# Patient Record
Sex: FEMALE | Race: WHITE | NOT HISPANIC OR LATINO | Employment: FULL TIME | URBAN - METROPOLITAN AREA
[De-identification: names, ages, dates, MRNs, and addresses within clinical notes are randomized per-mention and may not be internally consistent; named-entity substitution may affect disease eponyms.]

---

## 2017-03-29 ENCOUNTER — OFFICE VISIT (OUTPATIENT)
Dept: FAMILY MEDICINE | Facility: CLINIC | Age: 22
End: 2017-03-29
Payer: COMMERCIAL

## 2017-03-29 VITALS
DIASTOLIC BLOOD PRESSURE: 64 MMHG | HEIGHT: 68 IN | BODY MASS INDEX: 22.59 KG/M2 | SYSTOLIC BLOOD PRESSURE: 100 MMHG | WEIGHT: 149.06 LBS | HEART RATE: 87 BPM | RESPIRATION RATE: 16 BRPM | OXYGEN SATURATION: 99 %

## 2017-03-29 DIAGNOSIS — F41.8 DEPRESSION WITH ANXIETY: Primary | ICD-10-CM

## 2017-03-29 DIAGNOSIS — F81.9 LEARNING DISABILITY: ICD-10-CM

## 2017-03-29 PROCEDURE — 99213 OFFICE O/P EST LOW 20 MIN: CPT | Mod: S$GLB,,, | Performed by: FAMILY MEDICINE

## 2017-03-29 PROCEDURE — 1160F RVW MEDS BY RX/DR IN RCRD: CPT | Mod: S$GLB,,, | Performed by: FAMILY MEDICINE

## 2017-03-29 PROCEDURE — 99999 PR PBB SHADOW E&M-EST. PATIENT-LVL III: CPT | Mod: PBBFAC,,, | Performed by: FAMILY MEDICINE

## 2017-03-29 NOTE — PROGRESS NOTES
Subjective:       Patient ID: Thelma Roman is a 21 y.o. female.    Chief Complaint: No chief complaint on file.    HPI 21 year old female here for learning disability. Patient states she has failed math multiple times. She has a . She states she gets numbers mixed up. She also has increased anxiety with mental math.   Patient states these issues have been present since grade school. She has never been evaluated for a learning disorder. She is in her first year of college at the age of 21 and she states it is due to difficulty with math.   Review of Systems   Constitutional: Negative for chills and fever.   Respiratory: Negative for chest tightness and shortness of breath.    Cardiovascular: Negative for chest pain and leg swelling.   Gastrointestinal: Negative for abdominal pain, diarrhea, nausea and vomiting.   Psychiatric/Behavioral: Positive for decreased concentration. The patient is nervous/anxious.        Objective:      Vitals:    03/29/17 1602   BP: 100/64   Pulse: 87   Resp: 16     Physical Exam   Constitutional: She is oriented to person, place, and time. She appears well-developed and well-nourished. No distress.   HENT:   Head: Normocephalic and atraumatic.   Eyes: EOM are normal. Right eye exhibits no discharge. Left eye exhibits no discharge.   Cardiovascular: Normal rate.    Pulmonary/Chest: Effort normal.   Neurological: She is alert and oriented to person, place, and time.   Psychiatric: She has a normal mood and affect. Her behavior is normal. Thought content normal.   Vitals reviewed.      Assessment:       1. Depression with anxiety    2. Learning disability        Plan:         1. Patient to call psychologist for learning disability evaluation and behavioral therapy for anxiety. She will call clinic with update after assessment.   Depression with anxiety  -     Ambulatory referral to Psychology    Learning disability  -     Ambulatory referral to Psychology    Return if symptoms worsen  or fail to improve.

## 2017-03-29 NOTE — MR AVS SNAPSHOT
"    [facility]  [address] [place] [address]  Phone: [phone]  Fax: [phone]                  Thelma Roman   3/29/2017 4:00 PM   Office Visit    Description:  Female : 1995   Provider:  Aneta Garcia MD   Department:  [facility]           Diagnoses this Visit        Comments    Depression with anxiety    -  Primary     Learning disability                To Do List           Goals (5 Years of Data)     None      [facility]     [facility] On Call Nurse Care Line -  Assistance  Registered nurses in the [facility] On Call Center provide clinical advisement, health education, appointment booking, and other advisory services.  Call for this free service at [phone].             Medications           Message regarding Medications     Verify the changes and/or additions to your medication regime listed below are the same as discussed with your clinician today.  If any of these changes or additions are incorrect, please notify your healthcare provider.             Verify that the below list of medications is an accurate representation of the medications you are currently taking.  If none reported, the list may be blank. If incorrect, please contact your healthcare provider. Carry this list with you in case of emergency.           Current Medications     escitalopram oxalate (LEXAPRO) 20 MG tablet Take 1 tablet (20 mg total) by mouth once daily.    etonogestrel (NEXPLANON) 68 mg Impl by Subdermal route.           Clinical Reference Information           Your Vitals Were     BP Pulse Resp Height Weight SpO2    100/64 (BP Location: Left arm, Patient Position: Sitting, BP Method: Manual) 87 16 5' 8" (1.727 m) 67.6 kg (149 lb 0.5 oz) 99%    BMI                22.66 kg/m2          Blood Pressure          Most Recent Value    BP  100/64      Allergies as of 3/29/2017     No Known Allergies      Immunizations Administered on Date of Encounter - 3/29/2017     " None      Orders Placed During Today's Visit      Normal Orders This Visit    Ambulatory referral to Psychology       Language Assistance Services     ATTENTION: Language assistance services are available, free of charge. Please call [phone].      ATENCIÓN: Si habmarkel valle, tiene a gonzalez disposición servicios gratuitos de asistencia lingüística. Llame al [phone].     CHÚ Ý: N?u b?n nói Ti?ng Vi?t, có các d?ch v? h? tr? ngôn ng? mi?n phí dành cho b?n. G?i s? [phone].         [facility] complies with applicable Federal civil rights laws and does not discriminate on the basis of race, color, national origin, age, disability, or sex.

## 2017-04-11 ENCOUNTER — OFFICE VISIT (OUTPATIENT)
Dept: FAMILY MEDICINE | Facility: CLINIC | Age: 22
End: 2017-04-11
Payer: COMMERCIAL

## 2017-04-11 ENCOUNTER — TELEPHONE (OUTPATIENT)
Dept: FAMILY MEDICINE | Facility: CLINIC | Age: 22
End: 2017-04-11

## 2017-04-11 VITALS
OXYGEN SATURATION: 97 % | SYSTOLIC BLOOD PRESSURE: 116 MMHG | WEIGHT: 165.13 LBS | DIASTOLIC BLOOD PRESSURE: 74 MMHG | HEART RATE: 88 BPM | RESPIRATION RATE: 16 BRPM | HEIGHT: 69 IN | BODY MASS INDEX: 24.46 KG/M2

## 2017-04-11 DIAGNOSIS — S66.911A WRIST STRAIN, RIGHT, INITIAL ENCOUNTER: ICD-10-CM

## 2017-04-11 DIAGNOSIS — S99.911A RIGHT ANKLE INJURY, INITIAL ENCOUNTER: Primary | ICD-10-CM

## 2017-04-11 PROCEDURE — 99214 OFFICE O/P EST MOD 30 MIN: CPT | Mod: S$GLB,,, | Performed by: NURSE PRACTITIONER

## 2017-04-11 PROCEDURE — 1160F RVW MEDS BY RX/DR IN RCRD: CPT | Mod: S$GLB,,, | Performed by: NURSE PRACTITIONER

## 2017-04-11 PROCEDURE — 99999 PR PBB SHADOW E&M-EST. PATIENT-LVL III: CPT | Mod: PBBFAC,,, | Performed by: NURSE PRACTITIONER

## 2017-04-11 RX ORDER — IBUPROFEN 800 MG/1
800 TABLET ORAL 3 TIMES DAILY
Qty: 42 TABLET | Refills: 0 | Status: SHIPPED | OUTPATIENT
Start: 2017-04-11 | End: 2017-04-25

## 2017-04-11 NOTE — PROGRESS NOTES
Subjective:       Patient ID: Thelma Roman is a 21 y.o. female.    Chief Complaint: Wrist Pain and Ankle Pain    Wrist Pain    The pain is present in the right wrist. This is a new problem. Episode onset: 4 days ago. There has been a history of trauma. The problem occurs constantly. The problem has been unchanged. The quality of the pain is described as aching. The pain is at a severity of 3/10. The pain is mild. Pertinent negatives include no inability to bear weight, joint swelling, limited range of motion, numbness, stiffness or tingling. The symptoms are aggravated by activity. She has tried acetaminophen for the symptoms. The treatment provided no relief.   Ankle Pain    Incident onset: 4 days ago. The incident occurred at home (fell down the stairs). The injury mechanism was an inversion injury. The pain is present in the right ankle. The quality of the pain is described as aching. The pain is at a severity of 5/10. The pain is moderate. The pain has been constant since onset. Pertinent negatives include no inability to bear weight, loss of motion, loss of sensation, muscle weakness, numbness or tingling. The symptoms are aggravated by movement. She has tried acetaminophen for the symptoms. The treatment provided no relief.     Review of Systems   Constitutional: Negative.    Musculoskeletal: Negative for joint swelling and stiffness.   Skin: Negative.    Neurological: Negative for tingling, weakness and numbness.   Hematological: Negative.    Psychiatric/Behavioral: The patient is nervous/anxious.        Objective:      Physical Exam   Constitutional: She is oriented to person, place, and time. She appears well-developed and well-nourished.   HENT:   Head: Normocephalic and atraumatic.   Eyes: EOM are normal. Pupils are equal, round, and reactive to light.   Neck: Normal range of motion.   Cardiovascular: Normal rate and intact distal pulses.    Pulmonary/Chest: Effort normal. No respiratory distress.    Musculoskeletal: She exhibits no edema.        Right wrist: She exhibits normal range of motion and no tenderness.        Right ankle: She exhibits normal range of motion and no swelling. Tenderness.        Feet:    Pain when right ankle inverted with ROM.    Neurological: She is alert and oriented to person, place, and time. Coordination normal.   Skin: Skin is warm and dry.   Psychiatric: She has a normal mood and affect. Her behavior is normal. Judgment and thought content normal.   Vitals reviewed.      Assessment:       1. Right ankle injury, initial encounter    2. Wrist strain, right, initial encounter        Plan:       Thelma was seen today for wrist pain and ankle pain.    Diagnoses and all orders for this visit:    Right ankle injury, initial encounter  -     X-Ray Ankle Complete Right; Future  -     ibuprofen (ADVIL,MOTRIN) 800 MG tablet; Take 1 tablet (800 mg total) by mouth 3 (three) times daily.    Wrist strain, right, initial encounter  -     ibuprofen (ADVIL,MOTRIN) 800 MG tablet; Take 1 tablet (800 mg total) by mouth 3 (three) times daily.      Follow-up in 2 weeks if symptoms worsen or fail to improve.     Terence Tam NP

## 2017-04-11 NOTE — MR AVS SNAPSHOT
[facility]  [address] [place] [address]  Phone: [phone]  Fax: [phone]                  Thelma Roman   2017 4:00 PM   Office Visit    Description:  Female : 1995   Provider:  Terence Tam NP   Department:  [facility]           Reason for Visit     Wrist Pain     Ankle Pain           Diagnoses this Visit        Comments    Right ankle injury, initial encounter    -  Primary     Wrist strain, right, initial encounter                To Do List           Future Appointments        Provider Department Dept Phone    2017 4:00 PM Terence Tam NP [facility] [phone]      Goals (5 Years of Data)     None       These Medications        Disp Refills Start End    ibuprofen (ADVIL,MOTRIN) 800 MG tablet 42 tablet 0 2017    Take 1 tablet (800 mg total) by mouth 3 (three) times daily. - Oral    Pharmacy: [id number] - [place] - [address] [facility] [address]  #: [phone]         [facility]     [facility] Nurse Care Line -  Assistance  Unless otherwise directed by your provider, please contact [facility], our nurse care line that is available for  assistance.     Registered nurses in the [facility] Center provide: appointment scheduling, clinical advisement, health education, and other advisory services.  Call: [phone] (toll free)               Medications           Message regarding Medications     Verify the changes and/or additions to your medication regime listed below are the same as discussed with your clinician today.  If any of these changes or additions are incorrect, please notify your healthcare provider.        START taking these NEW medications        Refills    ibuprofen (ADVIL,MOTRIN) 800 MG tablet 0    Sig: Take 1 tablet (800 mg total) by mouth 3 (three) times daily.    Class: Normal    Route: Oral           Verify that the below list of medications is an  "accurate representation of the medications you are currently taking.  If none reported, the list may be blank. If incorrect, please contact your healthcare provider. Carry this list with you in case of emergency.           Current Medications     escitalopram oxalate (LEXAPRO) 20 MG tablet Take 1 tablet (20 mg total) by mouth once daily.    etonogestrel (NEXPLANON) 68 mg Impl by Subdermal route.    ibuprofen (ADVIL,MOTRIN) 800 MG tablet Take 1 tablet (800 mg total) by mouth 3 (three) times daily.           Clinical Reference Information           Your Vitals Were     BP Pulse Resp Height Weight SpO2    116/74 (BP Location: Right arm, Patient Position: Sitting, BP Method: Manual) 88 16 5' 8.5" (1.74 m) 74.9 kg (165 lb 2 oz) 97%    BMI                24.74 kg/m2          Blood Pressure          Most Recent Value    BP  116/74      Allergies as of 4/11/2017     No Known Allergies      Immunizations Administered on Date of Encounter - 4/11/2017     None      Orders Placed During Today's Visit     Future Labs/Procedures Expected by Expires    X-Ray Ankle Complete Right  4/11/2017 4/11/2018      Instructions    1. Rest, warm compress to area periodically throughout the day, and apply ace bandage to right ankle for support.   2. Follow-up in 2 weeks if symptoms worsen or fail to improve.        Language Assistance Services     ATTENTION: Language assistance services are available, free of charge. Please call [phone].      ATENCIÓN: Si gwendolyn valle, tiene a gonzalez disposición servicios gratuitos de asistencia lingüística. Llame al [phone].     [facility] Ý: N?u b?n nói Ti?ng Vi?t, có các d?ch v? h? tr? ngôn ng? mi?n phí dành cho b?n. G?i s? [phone].         [facility] complies with applicable Federal civil rights laws and does not discriminate on the basis of race, color, national origin, age, disability, or sex.        "

## 2017-04-11 NOTE — PATIENT INSTRUCTIONS
1. Rest, warm compress to area periodically throughout the day, and apply ace bandage to right ankle for support.   2. Follow-up in 2 weeks if symptoms worsen or fail to improve.

## 2017-05-04 DIAGNOSIS — F41.8 DEPRESSION WITH ANXIETY: ICD-10-CM

## 2017-05-05 RX ORDER — ESCITALOPRAM OXALATE 20 MG/1
TABLET ORAL
Qty: 30 TABLET | Refills: 4 | Status: SHIPPED | OUTPATIENT
Start: 2017-05-05 | End: 2017-10-15 | Stop reason: SDUPTHER

## 2017-07-17 ENCOUNTER — PATIENT MESSAGE (OUTPATIENT)
Dept: FAMILY MEDICINE | Facility: CLINIC | Age: 22
End: 2017-07-17

## 2017-07-24 ENCOUNTER — PATIENT MESSAGE (OUTPATIENT)
Dept: FAMILY MEDICINE | Facility: CLINIC | Age: 22
End: 2017-07-24

## 2017-08-03 ENCOUNTER — PATIENT MESSAGE (OUTPATIENT)
Dept: FAMILY MEDICINE | Facility: CLINIC | Age: 22
End: 2017-08-03

## 2017-08-21 ENCOUNTER — OFFICE VISIT (OUTPATIENT)
Dept: URGENT CARE | Facility: CLINIC | Age: 22
End: 2017-08-21
Payer: COMMERCIAL

## 2017-08-21 VITALS
DIASTOLIC BLOOD PRESSURE: 60 MMHG | TEMPERATURE: 99 F | BODY MASS INDEX: 27.64 KG/M2 | RESPIRATION RATE: 18 BRPM | HEIGHT: 66 IN | WEIGHT: 172 LBS | OXYGEN SATURATION: 98 % | SYSTOLIC BLOOD PRESSURE: 119 MMHG | HEART RATE: 102 BPM

## 2017-08-21 DIAGNOSIS — R25.1 SHAKINESS: Primary | ICD-10-CM

## 2017-08-21 DIAGNOSIS — R00.0 TACHYCARDIA: ICD-10-CM

## 2017-08-21 PROCEDURE — 3008F BODY MASS INDEX DOCD: CPT | Mod: S$GLB,,, | Performed by: PHYSICIAN ASSISTANT

## 2017-08-21 PROCEDURE — 99214 OFFICE O/P EST MOD 30 MIN: CPT | Mod: S$GLB,,, | Performed by: PHYSICIAN ASSISTANT

## 2017-08-21 NOTE — PATIENT INSTRUCTIONS
Dizziness (Uncertain Cause)  Dizziness is a common symptom. It may be described as lightheadedness, spinning, or feeling like you are going to faint. Dizziness can have many causes.  Be sure to tell the healthcare provider about:  · All medicines you take, including prescription, over-the-counter, herbs, and supplements  · Any other symptoms you have  · Any health problems you are being treated for  · Anything that causes the dizziness to get worse or better  Today's exam did not show an exact cause for your dizziness. Other tests may be needed. Follow up with your healthcare provider.  Home care  · Dizziness that occurs with sudden standing may be a sign of mild dehydration. Drink extra fluids for the next few days.  · If you recently started a new medicine, stopped a medicine, or had the dose of a current medicine changed, talk with the prescribing healthcare provider. Your medicine plan may need adjustment.  · If dizziness lasts more than a few seconds, sit or lie down until it passes. This may help prevent injury in case you pass out.  · Do not drive or use power tools or dangerous equipment until you have had no dizziness for at least 48 hours.  Follow-up care  Follow up with your healthcare provider for further evaluation within the next 7 days or as advised.  When to seek medical advice  Call your healthcare provider for any of the following:  · Worsening of symptoms or new symptoms  · Passing out or seizure  · Repeated vomiting  · Headache  · Palpitations (the sense that your heart is fluttering or beating fast or hard)  · Shortness of breath  · Blood in vomit or stool (black or red color)  · Weakness of an arm or leg or one side of the face  · Vision or hearing changes  · Trouble walking or speaking  · Chest, arm, neck, back, or jaw pain  Date Last Reviewed: 8/23/2015  © 3133-5468 [employer]. [address] [place] [address], [place] [address]. All rights reserved. This information is not intended as  "a substitute for professional medical care. Always follow your healthcare professional's instructions.        Heart Palpitations    Palpitations are the feeling that your heart is beating hard, fast, or irregular. Some describe it as "pounding" or "skipped beats." Palpitations may occur in someone with heart disease, but can also occur in a healthy person.  Heart-related causes:  · Arrhythmia (a change from the heart's normal rhythm)  · Heart valve disease  · Disease of the heart muscle  · Coronary artery disease  · High blood pressure  Non-heart-related causes:  · Certain medicines (such as asthma inhalers and decongestants)  · Some herbal supplements, energy drinks and pills, and weight loss pills  · Illegal stimulant drugs (such as cocaine, crank, methamphetamine, PCP, bath salts, ecstasy)  · Caffeine, alcohol, and tobacco  · Medical conditions such as thyroid disease, anemia, anxiety, and panic disorder  Sometimes the cause can't be found.  Home care  Follow these home care tips:  · Avoid excess caffeine, alcohol, tobacco, and any stimulant drugs.  · Tell your doctor about any prescription or over-the-counter or herbal medicines you take.  Follow-up care  · Follow up with your doctor, or as advised.  Call 911  This is the fastest and safest way to get to the emergency department. The paramedics can also begin treatment on the way to the hospital, if needed.  Don't wait until your symptoms are severe to call 911. These are reasons to call 911:  · Chest pain  · Shortness of breath  · Feeling lightheaded, faint, or dizzy  · Fainting or loss of consciousness  · Very irregular heartbeat  · Rapid heartbeat that makes you uncomfortable  · Slower than usual heart rate associated with symptoms  · Slower than usual heart rate  · Chest pain with weakness, dizziness, heavy sweating, nausea, or vomiting  · Extreme drowsiness or confusion  · Weakness of an arm or leg, or on 1 side of the face  · Difficulty with speech or " vision  When to seek medical advice  Get prompt medical attention if you have palpitations and any of the following:  · Weakness  · Dizziness  · Lightheadedness  · Fainting  Date Last Reviewed: 4/27/2016 © 2000-2016 [employer]. [address] [place] [address], [place] [address]. All rights reserved. This information is not intended as a substitute for professional medical care. Always follow your healthcare professional's instructions.      Please follow up with your Primary care provider within 2-5 days if your signs ans symptoms have not resolved or worsen.     If your condition worsens or fails to improve we recommend that you receive another evaluation at the emergency room immediately or contact your primary medical clinic to discuss your concerns.   You must understand that you have received an Urgent Care treatment only and that you may be released before all of your medical problems are known or treated. You, the patient, will arrange for follow up care as instructed.

## 2017-08-21 NOTE — PROGRESS NOTES
"Subjective:       Patient ID: Thelma Roman is a 22 y.o. female.    Vitals:  height is 5' 6" (1.676 m) and weight is 78 kg (172 lb). Her temperature is 98.7 °F (37.1 °C). Her blood pressure is 119/60 and her pulse is 102. Her respiration is 18 and oxygen saturation is 98%.     Chief Complaint: Dizziness (shakiness) and Shortness of Breath    Patient states this is not a panic/anxiety attack like she is used to. She states her mental status is normal but she feels as if her body is shaking and she is scatter brained. She denies any neurologic symptoms. She denies N/V/D or syncope. She denies CP      Dizziness:   Chronicity:  Recurrent  Onset:  1 to 4 weeks ago  Progression since onset:  Gradually worsening  Frequency:  Every few hours  Severity:  Moderate  Dizziness characteristics:  Lightheaded/impending faint  Frequency of Spells:  Daily   Associated symptoms: headaches and light-headedness.no hearing loss, no ear congestion, no ear pain, no fever, no tinnitus, no nausea, no vomiting, no diaphoresis, no aural fullness, no weakness, no visual disturbances, no syncope, no palpitations, no panic, no facial weakness, no slurred speech, no numbness in extremities and no chest pain.  Aggravated by:  Exertion  Treatments tried:  Body position changes  Improvements on treatment:  Mild  Shortness of Breath   Associated symptoms include headaches. Pertinent negatives include no abdominal pain, chest pain, ear pain, fever, rash, sore throat, syncope or vomiting.     Review of Systems   Constitution: Positive for malaise/fatigue. Negative for chills, diaphoresis, fever and weakness.   HENT: Positive for headaches. Negative for ear pain, hearing loss, sore throat and tinnitus.    Eyes: Negative for blurred vision.   Cardiovascular: Negative for chest pain, palpitations and syncope.   Respiratory: Positive for shortness of breath.    Skin: Negative for rash.   Musculoskeletal: Negative for back pain and joint pain. "   Gastrointestinal: Negative for abdominal pain, diarrhea, nausea and vomiting.   Neurological: Positive for difficulty with concentration, dizziness and light-headedness.   Psychiatric/Behavioral: The patient is not nervous/anxious.        Objective:      Physical Exam   Constitutional: She is oriented to person, place, and time. She appears well-developed and well-nourished. She is cooperative.  Non-toxic appearance. She does not appear ill. No distress.   HENT:   Head: Normocephalic and atraumatic.   Right Ear: Hearing, tympanic membrane, external ear and ear canal normal.   Left Ear: Hearing, tympanic membrane, external ear and ear canal normal.   Nose: Nose normal. No mucosal edema, rhinorrhea or nasal deformity. No epistaxis. Right sinus exhibits no maxillary sinus tenderness and no frontal sinus tenderness. Left sinus exhibits no maxillary sinus tenderness and no frontal sinus tenderness.   Mouth/Throat: Uvula is midline, oropharynx is clear and moist and mucous membranes are normal. No trismus in the jaw. Normal dentition. No uvula swelling. No posterior oropharyngeal erythema.   Eyes: Conjunctivae and lids are normal. Right eye exhibits no discharge. Left eye exhibits no discharge. No scleral icterus.   Sclera clear bilat   Neck: Trachea normal, normal range of motion, full passive range of motion without pain and phonation normal. Neck supple.   Cardiovascular: Regular rhythm, normal heart sounds, intact distal pulses and normal pulses.  Tachycardia present.    Pulmonary/Chest: Effort normal and breath sounds normal. No respiratory distress. She has no decreased breath sounds. She has no wheezes. She has no rhonchi. She has no rales.   Abdominal: Soft. Normal appearance and bowel sounds are normal. She exhibits no distension, no pulsatile midline mass and no mass. There is no tenderness.   Musculoskeletal: Normal range of motion. She exhibits no edema or deformity.   Neurological: She is alert and oriented  to person, place, and time. She exhibits normal muscle tone. Coordination normal.   Skin: Skin is warm, dry and intact. She is not diaphoretic. No pallor.   Psychiatric: Her speech is normal and behavior is normal. Judgment and thought content normal. Her mood appears anxious. Cognition and memory are normal.   Nursing note and vitals reviewed.        EKG: normal EKG, normal sinus rhythm.     Assessment:       1. Shakiness    2. Tachycardia        Plan:         Shakiness    Tachycardia  -     IN OFFICE EKG 12-LEAD (to Muse)      DISCUSSED WITH PATIENT LUDY FU WITH PCP IS WARRANTED. I RECOMMEND LAB TESTING FOR HER THYROID HORMONE LEVELS SPECIFICALLY AS WELL AS CBC. NO EXACT CAUSE KNOWN AT THIS POINT. PATIENT STATES SHE HAS AN APPOINTMENT SCHEDULED WITH HER PCP THIS Friday.     If your condition worsens or fails to improve we recommend that you receive another evaluation at the emergency room immediately or contact your primary medical clinic to discuss your concerns.   You must understand that you have received an Urgent Care treatment only and that you may be released before all of your medical problems are known or treated. You, the patient, will arrange for follow up care as instructed.

## 2017-08-23 ENCOUNTER — TELEPHONE (OUTPATIENT)
Dept: URGENT CARE | Facility: CLINIC | Age: 22
End: 2017-08-23

## 2017-08-25 ENCOUNTER — OFFICE VISIT (OUTPATIENT)
Dept: FAMILY MEDICINE | Facility: CLINIC | Age: 22
End: 2017-08-25
Payer: COMMERCIAL

## 2017-08-25 VITALS
HEART RATE: 104 BPM | DIASTOLIC BLOOD PRESSURE: 76 MMHG | WEIGHT: 172.38 LBS | SYSTOLIC BLOOD PRESSURE: 116 MMHG | HEIGHT: 69 IN | BODY MASS INDEX: 25.53 KG/M2 | OXYGEN SATURATION: 98 % | RESPIRATION RATE: 18 BRPM

## 2017-08-25 DIAGNOSIS — R00.2 HEART PALPITATIONS: Primary | ICD-10-CM

## 2017-08-25 PROCEDURE — 99999 PR PBB SHADOW E&M-EST. PATIENT-LVL III: CPT | Mod: PBBFAC,,, | Performed by: FAMILY MEDICINE

## 2017-08-25 PROCEDURE — 99213 OFFICE O/P EST LOW 20 MIN: CPT | Mod: S$GLB,,, | Performed by: FAMILY MEDICINE

## 2017-08-25 PROCEDURE — 3008F BODY MASS INDEX DOCD: CPT | Mod: S$GLB,,, | Performed by: FAMILY MEDICINE

## 2017-08-25 NOTE — PROGRESS NOTES
Subjective:       Patient ID: Thelma Roman is a 22 y.o. female.    Chief Complaint: Tachycardia and Blurred Vision    HPI 22 year old female here for tachycardia and blurred vision 3 weeks ago. Patient states her HR has been in 120-130s. She has felt palpitations.   Patient went to urgent care 3 days ago and EKG was normal. Patient denies excess stress. She denies illicit drug use.   In the last 3 weeks, patient has had daily episodes where her HR increases to 120s. She has increased water intake and has not seen improvement in symptoms. She is not drinking coffee currently and still having episodes. She does not consume energy drinks. She states BP does not change. She is on a high protein diet and tries to eat every couple hours as her BG has been known to decrease.     Review of Systems   Constitutional: Negative for chills and fever.   Respiratory: Negative for chest tightness and shortness of breath.    Cardiovascular: Positive for palpitations. Negative for chest pain and leg swelling.   Gastrointestinal: Negative for abdominal pain, diarrhea, nausea and vomiting.   Neurological: Positive for dizziness.       Objective:      Vitals:    08/25/17 0931   BP: 116/76   Pulse: 104   Resp: 18     Physical Exam   Constitutional: She is oriented to person, place, and time. She appears well-developed and well-nourished. No distress.   Cardiovascular: Normal rate and regular rhythm.    Pulmonary/Chest: Effort normal. She has no wheezes.   Abdominal: Soft. There is no tenderness. There is no guarding.   Neurological: She is alert and oriented to person, place, and time.   Skin: She is not diaphoretic.   Psychiatric: She has a normal mood and affect. Her behavior is normal. Judgment and thought content normal.   Vitals reviewed.      Assessment:       1. Heart palpitations        Plan:         1. Heart palpitations: labs reviewed and are within normal range. Will refer to cards for further workup. Patient advised to go  to ED with any worsening of symptoms.     Heart palpitations  -     TSH; Future; Expected date: 08/25/2017  -     CBC auto differential; Future; Expected date: 08/25/2017  -     Comprehensive metabolic panel; Future; Expected date: 08/25/2017  -     Ambulatory referral to Cardiology      Return in about 1 week (around 9/1/2017).

## 2017-10-15 DIAGNOSIS — F41.8 DEPRESSION WITH ANXIETY: ICD-10-CM

## 2017-10-16 RX ORDER — ESCITALOPRAM OXALATE 20 MG/1
TABLET ORAL
Qty: 30 TABLET | Refills: 3 | Status: SHIPPED | OUTPATIENT
Start: 2017-10-16 | End: 2018-04-03

## 2018-04-03 ENCOUNTER — OFFICE VISIT (OUTPATIENT)
Dept: INTERNAL MEDICINE | Facility: CLINIC | Age: 23
End: 2018-04-03
Payer: COMMERCIAL

## 2018-04-03 VITALS
DIASTOLIC BLOOD PRESSURE: 60 MMHG | OXYGEN SATURATION: 98 % | WEIGHT: 183.75 LBS | RESPIRATION RATE: 16 BRPM | HEIGHT: 69 IN | HEART RATE: 81 BPM | SYSTOLIC BLOOD PRESSURE: 118 MMHG | BODY MASS INDEX: 27.22 KG/M2

## 2018-04-03 DIAGNOSIS — F41.0 PANIC DISORDER WITHOUT AGORAPHOBIA: Chronic | ICD-10-CM

## 2018-04-03 DIAGNOSIS — F41.8 DEPRESSION WITH ANXIETY: Primary | ICD-10-CM

## 2018-04-03 PROCEDURE — 99213 OFFICE O/P EST LOW 20 MIN: CPT | Mod: S$GLB,,, | Performed by: INTERNAL MEDICINE

## 2018-04-03 PROCEDURE — 99999 PR PBB SHADOW E&M-EST. PATIENT-LVL IV: CPT | Mod: PBBFAC,,, | Performed by: INTERNAL MEDICINE

## 2018-04-03 RX ORDER — SERTRALINE HYDROCHLORIDE 50 MG/1
50 TABLET, FILM COATED ORAL DAILY
Qty: 30 TABLET | Refills: 11 | Status: SHIPPED | OUTPATIENT
Start: 2018-04-03 | End: 2018-07-23 | Stop reason: DRUGHIGH
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
## 2018-04-17 ENCOUNTER — OFFICE VISIT (OUTPATIENT)
Dept: INTERNAL MEDICINE | Facility: CLINIC | Age: 23
End: 2018-04-17
Payer: COMMERCIAL

## 2018-04-17 VITALS
SYSTOLIC BLOOD PRESSURE: 118 MMHG | DIASTOLIC BLOOD PRESSURE: 60 MMHG | WEIGHT: 181.88 LBS | HEIGHT: 69 IN | BODY MASS INDEX: 26.94 KG/M2 | RESPIRATION RATE: 16 BRPM | OXYGEN SATURATION: 98 %

## 2018-04-17 DIAGNOSIS — F41.0 PANIC DISORDER WITHOUT AGORAPHOBIA: Chronic | ICD-10-CM

## 2018-04-17 DIAGNOSIS — F41.8 DEPRESSION WITH ANXIETY: Primary | ICD-10-CM

## 2018-04-17 DIAGNOSIS — R00.2 HEART PALPITATIONS: ICD-10-CM

## 2018-04-17 PROCEDURE — 99999 PR PBB SHADOW E&M-EST. PATIENT-LVL III: CPT | Mod: PBBFAC,,, | Performed by: INTERNAL MEDICINE

## 2018-04-17 PROCEDURE — 99213 OFFICE O/P EST LOW 20 MIN: CPT | Mod: S$GLB,,, | Performed by: INTERNAL MEDICINE

## 2018-04-17 NOTE — PROGRESS NOTES
"Subjective:      Patient ID: Thelma Roman is a 22 y.o. female.    Chief Complaint: Follow-up (2 week follow up)    HPI: 22 y.o. White female, feels the best she has in a long time.  Not anxious now, not angry.  Feels normal.  Has energy, but not manic, just not depressed.  Not suicidal or homicidal.  Sleeping.  Able to complete tasks.  Will be going to Psychologist in next several weeks.    Review of Systems   Respiratory: Negative for chest tightness and shortness of breath.    Cardiovascular: Negative for chest pain and palpitations.   Psychiatric/Behavioral:        No further panic       Objective:   /60 (BP Location: Right arm, Patient Position: Sitting, BP Method: Large (Manual))   Resp 16   Ht 5' 9" (1.753 m)   Wt 82.5 kg (181 lb 14.4 oz)   SpO2 98%   BMI 26.86 kg/m²     Physical Exam   Constitutional: She is oriented to person, place, and time. She appears well-developed and well-nourished.   HENT:   Head: Normocephalic and atraumatic.   Neurological: She is alert and oriented to person, place, and time.   Skin: Skin is warm and dry.   Psychiatric: She has a normal mood and affect. Her behavior is normal.   Nursing note and vitals reviewed.      Assessment:     1. Depression with anxiety    2. Panic disorder without agoraphobia    3. Heart palpitations    Seem to be under control with Zoloft 50mg/day.  Plan:     Depression with anxiety    Panic disorder without agoraphobia    Heart palpitations    Same RX  See Psychologist.  "

## 2018-05-08 ENCOUNTER — TELEPHONE (OUTPATIENT)
Dept: INTERNAL MEDICINE | Facility: CLINIC | Age: 23
End: 2018-05-08

## 2018-05-08 NOTE — TELEPHONE ENCOUNTER
Left message on patient voice mail to call office and reschedule appointment. Dr. Moise is out of office today.vf/ma

## 2018-05-11 ENCOUNTER — PATIENT MESSAGE (OUTPATIENT)
Dept: INTERNAL MEDICINE | Facility: CLINIC | Age: 23
End: 2018-05-11

## 2018-06-26 ENCOUNTER — OFFICE VISIT (OUTPATIENT)
Dept: FAMILY MEDICINE | Facility: CLINIC | Age: 23
End: 2018-06-26
Payer: COMMERCIAL

## 2018-06-26 VITALS
TEMPERATURE: 99 F | HEART RATE: 98 BPM | BODY MASS INDEX: 26.39 KG/M2 | SYSTOLIC BLOOD PRESSURE: 110 MMHG | OXYGEN SATURATION: 98 % | WEIGHT: 178.19 LBS | HEIGHT: 69 IN | DIASTOLIC BLOOD PRESSURE: 80 MMHG

## 2018-06-26 DIAGNOSIS — J00 ACUTE NASOPHARYNGITIS: ICD-10-CM

## 2018-06-26 PROBLEM — R00.2 HEART PALPITATIONS: Status: RESOLVED | Noted: 2017-08-25 | Resolved: 2018-06-26

## 2018-06-26 PROCEDURE — 99213 OFFICE O/P EST LOW 20 MIN: CPT | Mod: S$GLB,,, | Performed by: FAMILY MEDICINE

## 2018-06-26 PROCEDURE — 99999 PR PBB SHADOW E&M-EST. PATIENT-LVL IV: CPT | Mod: PBBFAC,,, | Performed by: FAMILY MEDICINE

## 2018-06-26 NOTE — PROGRESS NOTES
Subjective:       Patient ID: Thelma Roman is a 23 y.o. female.    Chief Complaint: Sore Throat; Cough; Nasal Congestion; Shortness of Breath (x 3days); and Headache    Thelma is a 22 yo female who presents with cough, congestion, headache and sorethroat for 4-5 days.      URI    This is a new problem. The current episode started in the past 7 days (started about 4-5 days ago s/p flight return from [place]. ). The problem has been gradually improving. There has been no fever. Associated symptoms include congestion, coughing, headaches, rhinorrhea, sinus pain and a sore throat. Pertinent negatives include no abdominal pain, chest pain, diarrhea, dysuria, ear pain, nausea, rash, sneezing, swollen glands or wheezing. She has tried nothing (Reports that has had similar symptomes int he past and was seen by ENT. Was recommended to use Flonase but felt no relief. Reports that does not seemed to have responded to abx in the past) for the symptoms.     Review of Systems   Constitutional: Positive for fatigue. Negative for appetite change, chills, diaphoresis and fever.   HENT: Positive for congestion, rhinorrhea, sinus pain and sore throat. Negative for ear pain, facial swelling, mouth sores and sneezing.    Eyes: Negative for redness.   Respiratory: Positive for cough and shortness of breath (2/2 congestion ). Negative for wheezing.    Cardiovascular: Negative for chest pain.   Gastrointestinal: Negative for abdominal pain, diarrhea and nausea.   Genitourinary: Negative for dysuria and urgency.   Musculoskeletal: Negative for myalgias.   Skin: Negative for rash.   Neurological: Positive for headaches. Negative for light-headedness.       Objective:       Vitals:    06/26/18 0956   BP: 110/80   Pulse: 102   Temp: 98.5 °F (36.9 °C)       Physical Exam   Constitutional: No distress.   HENT:   Right Ear: External ear normal.   Left Ear: External ear normal.   Nose: Rhinorrhea present. No mucosal edema or nasal deformity. No  epistaxis. Right sinus exhibits maxillary sinus tenderness. Right sinus exhibits no frontal sinus tenderness. Left sinus exhibits maxillary sinus tenderness. Left sinus exhibits no frontal sinus tenderness.   Mouth/Throat: Oropharynx is clear and moist. Mucous membranes are dry.   Eyes: Right conjunctiva is not injected. Left conjunctiva is not injected.   Neck: Neck supple.   Cardiovascular: Normal rate and regular rhythm.    No murmur heard.  Pulmonary/Chest: Effort normal and breath sounds normal. No stridor. She has no wheezes. She has no rales.   Abdominal: Soft.   Lymphadenopathy:     She has no cervical adenopathy.   Neurological: She is alert.   Skin: Skin is warm. She is not diaphoretic.       Assessment:       1. Acute nasopharyngitis      - appears viral etiology    Plan:       Rec Supportive care. NSAIDs with food as needed. Nasal saline rinse. Rec Flonase but pt reports no relief in the past. Encouraged to call in worsening or no improvement in 5 days.     Amy Molina, DO

## 2018-06-26 NOTE — PATIENT INSTRUCTIONS
Place upper respiratory infection patient instructions here.   Adult Self-Care for Colds  Colds are caused by viruses. They can't be cured with antibiotics. However, you can ease symptoms and support your body's efforts to heal itself.  No matter which symptoms you have, be sure to:  · Drink plenty of fluids (water or clear soup)  · Stop smoking and drinking alcohol  · Get plenty of rest    Understand a fever  · Take your temperature several times a day. If your fever is 100.4°F (38.0°C) for more than a day, call your healthcare provider.  · Relax, lie down. Go to bed if you want. Just get off your feet and rest. Also, drink plenty of fluids to avoid dehydration.  · Take acetaminophen or a nonsteroidal anti-inflammatory agent (NSAID), such as ibuprofen.  Treat a troubled nose kindly  · Breathe steam or heated humidified air to open blocked nasal passages.  a hot shower or use a vaporizer. Be careful not to get burned by the steam.  · Saline nasal sprays and decongestant tablets help open a stuffy nose. Antihistamines can also help, but they can cause side effects such as drowsiness and drying of the eyes, nose, and mouth.  Soothe a sore throat and cough  · Gargle every 2 hours with 1/4 teaspoon of salt dissolved in 1/2 cup of warm water. Suck on throat lozenges and cough drops to moisten your throat.  · Cough medicines are available but it is unclear how well they actually work.  · Take acetaminophen or an NSAID, such as ibuprofen, to ease throat pain  Ease digestive problems  · Put fluids back into your body. Take frequent sips of clear liquids such as water or broth. Avoid drinks that have a lot of sugar in them, such as juices and sodas. These can make diarrhea worse. Older children and adults can drink sports drinks.  · As your appetite returns, you can resume your normal diet. Ask your healthcare provider if there are any foods you should avoid.  When to seek medical care  When you first notice  symptoms, ask your healthcare provider if antiviral medicines are appropriate. Antibiotics should not be taken for colds or flu. Also, call your healthcare provider if you have any of the following symptoms or if you aren't feeling better after 7 days:  · Shortness of breath  · Pain or pressure in the chest or belly (abdomen)  · Worsening symptoms, especially after a period of improvement  · Fever of 100.4°F  (38.0°C) or higher, or fever that doesn't go down with medicine  · Sudden dizziness or confusion  · Severe or continued vomiting  · Signs of dehydration, including extreme thirst, dark urine, infrequent urination, dry mouth  · Spotted, red, or very sore throat   Date Last Reviewed: 12/1/2016  © 7326-4992 [employer]. [address] [place] [address], [place] [address]. All rights reserved. This information is not intended as a substitute for professional medical care. Always follow your healthcare professional's instructions.

## 2018-07-07 ENCOUNTER — PATIENT MESSAGE (OUTPATIENT)
Dept: INTERNAL MEDICINE | Facility: CLINIC | Age: 23
End: 2018-07-07

## 2018-07-22 ENCOUNTER — PATIENT MESSAGE (OUTPATIENT)
Dept: INTERNAL MEDICINE | Facility: CLINIC | Age: 23
End: 2018-07-22

## 2018-07-23 ENCOUNTER — PATIENT MESSAGE (OUTPATIENT)
Dept: INTERNAL MEDICINE | Facility: CLINIC | Age: 23
End: 2018-07-23

## 2018-07-23 DIAGNOSIS — F41.8 DEPRESSION WITH ANXIETY: Primary | ICD-10-CM

## 2018-07-23 RX ORDER — SERTRALINE HYDROCHLORIDE 100 MG/1
100 TABLET, FILM COATED ORAL DAILY
Qty: 30 TABLET | Refills: 11 | Status: SHIPPED | OUTPATIENT
Start: 2018-07-23 | End: 2019-03-13 | Stop reason: SDUPTHER

## 2018-10-10 ENCOUNTER — PATIENT MESSAGE (OUTPATIENT)
Dept: INTERNAL MEDICINE | Facility: CLINIC | Age: 23
End: 2018-10-10

## 2018-10-12 ENCOUNTER — OFFICE VISIT (OUTPATIENT)
Dept: INTERNAL MEDICINE | Facility: CLINIC | Age: 23
End: 2018-10-12
Payer: COMMERCIAL

## 2018-10-12 VITALS
SYSTOLIC BLOOD PRESSURE: 110 MMHG | OXYGEN SATURATION: 97 % | BODY MASS INDEX: 28.07 KG/M2 | HEART RATE: 87 BPM | DIASTOLIC BLOOD PRESSURE: 60 MMHG | HEIGHT: 69 IN | TEMPERATURE: 98 F | WEIGHT: 189.5 LBS | RESPIRATION RATE: 16 BRPM

## 2018-10-12 DIAGNOSIS — S16.1XXA STRAIN OF NECK MUSCLE, INITIAL ENCOUNTER: ICD-10-CM

## 2018-10-12 DIAGNOSIS — S89.91XS RIGHT KNEE INJURY, SEQUELA: Primary | ICD-10-CM

## 2018-10-12 DIAGNOSIS — M25.531 CHRONIC PAIN OF RIGHT WRIST: ICD-10-CM

## 2018-10-12 DIAGNOSIS — G89.29 CHRONIC PAIN OF RIGHT WRIST: ICD-10-CM

## 2018-10-12 PROCEDURE — 99999 PR PBB SHADOW E&M-EST. PATIENT-LVL IV: CPT | Mod: PBBFAC,,, | Performed by: INTERNAL MEDICINE

## 2018-10-12 PROCEDURE — 99213 OFFICE O/P EST LOW 20 MIN: CPT | Mod: S$GLB,,, | Performed by: INTERNAL MEDICINE

## 2018-10-12 NOTE — LETTER
October 12, 2018      [facility]  [facility] [facility] [address]   [place] [address]  Phone: [phone]  Fax: [phone]       Patient: Thelma Roman  YOB: 1995  Date of Visit: 10/12/2018    To Whom It May Concern:    Thelma Roman  was at [facility] on 10/12/2018. She may return to work 10/17/18 with no restrictions. If you have any questions or concerns, or if I can be of further assistance, please do not hesitate to contact me.    Sincerely,    Sarath Moise MD

## 2018-10-14 NOTE — PROGRESS NOTES
"Subjective:      Patient ID: Thelma Roman is a 23 y.o. female.    Chief Complaint: Knee Pain (right knee pain x 1 week); Neck Pain (x 4 week when she looks to her right); and Wrist Pain (over 1 year)    HPI: 23 y.o. White female , has multiple muscular/joint complaints.  _ Right wrist pain for > 1 year, does not drop anything, fingers maybe numb momentarily.  -Woke up 1 month ago with pain in her neck when she looks sideways. This gives her a headache.  -Right knee pain, especially when she climbs steps.  Has been taking ibuprofen with some relief of pain.      The Zoloft has helped her HEMA.        Review of Systems   Constitutional: Negative for activity change and unexpected weight change.   HENT: Negative for hearing loss, rhinorrhea and trouble swallowing.    Eyes: Negative for discharge and visual disturbance.   Respiratory: Negative for chest tightness and wheezing.    Cardiovascular: Negative for chest pain and palpitations.   Gastrointestinal: Positive for diarrhea. Negative for blood in stool, constipation and vomiting.   Endocrine: Negative for polydipsia and polyuria.   Genitourinary: Negative for difficulty urinating, dysuria, hematuria and menstrual problem.   Musculoskeletal: Positive for arthralgias and neck pain. Negative for joint swelling.   Skin: Negative.    Neurological: Positive for headaches. Negative for weakness.   Psychiatric/Behavioral: Negative for confusion and dysphoric mood.       Objective:   /60 (BP Location: Right arm, Patient Position: Sitting, BP Method: Medium (Manual))   Pulse 87   Temp 98.3 °F (36.8 °C) (Oral)   Resp 16   Ht 5' 9" (1.753 m)   Wt 86 kg (189 lb 8 oz)   SpO2 97%   BMI 27.98 kg/m²     Physical Exam   Constitutional: She is oriented to person, place, and time. She appears well-developed and well-nourished.   HENT:   Head: Normocephalic and atraumatic.   Nose: Nose normal.   Mouth/Throat: Oropharynx is clear and moist.   Eyes: Conjunctivae are normal. " Pupils are equal, round, and reactive to light.   Neck: Muscular tenderness present. No spinous process tenderness present. Carotid bruit is not present. No neck rigidity. Decreased range of motion present. No thyroid mass and no thyromegaly present.       Area of tenderness when she looks to the right.   Cardiovascular: Normal rate, regular rhythm and normal heart sounds.   Pulmonary/Chest: Effort normal and breath sounds normal.   Musculoskeletal:        Right wrist: She exhibits tenderness. She exhibits no swelling, no effusion and no crepitus.        Right knee: She exhibits normal range of motion, no swelling, no effusion, no deformity and no erythema. Tenderness found. Medial joint line, MCL and LCL tenderness noted.        Legs:  Neurological: She is alert and oriented to person, place, and time. She displays normal reflexes. No cranial nerve deficit or sensory deficit. She exhibits normal muscle tone.   Skin: Skin is warm and dry.   Psychiatric: She has a normal mood and affect. Her behavior is normal.   Nursing note and vitals reviewed.      Assessment:     1. Right knee injury, sequela    2. Strain of neck muscle, initial encounter    3. Chronic pain of right wrist      Plan:     Right knee injury, sequela  -     Ambulatory referral to Pain Clinic    Strain of neck muscle, initial encounter    Chronic pain of right wrist    Rest, ice/warm compresses whichever is better.  Continue the ibuprofen.  Use wrist splint to support it.  Change pillow ( use higher one). For the neck.

## 2018-10-16 ENCOUNTER — OFFICE VISIT (OUTPATIENT)
Dept: ORTHOPEDICS | Facility: CLINIC | Age: 23
End: 2018-10-16
Payer: COMMERCIAL

## 2018-10-16 VITALS
HEIGHT: 69 IN | SYSTOLIC BLOOD PRESSURE: 108 MMHG | BODY MASS INDEX: 28.14 KG/M2 | WEIGHT: 190 LBS | DIASTOLIC BLOOD PRESSURE: 78 MMHG

## 2018-10-16 DIAGNOSIS — M22.2X1 PATELLOFEMORAL SYNDROME OF RIGHT KNEE: ICD-10-CM

## 2018-10-16 DIAGNOSIS — M25.561 RIGHT KNEE PAIN, UNSPECIFIED CHRONICITY: Primary | ICD-10-CM

## 2018-10-16 DIAGNOSIS — M25.569 RECURRENT KNEE PAIN, UNSPECIFIED LATERALITY: Primary | ICD-10-CM

## 2018-10-16 PROCEDURE — 99203 OFFICE O/P NEW LOW 30 MIN: CPT | Mod: S$GLB,,, | Performed by: ORTHOPAEDIC SURGERY

## 2018-10-16 PROCEDURE — 99999 PR PBB SHADOW E&M-EST. PATIENT-LVL III: CPT | Mod: PBBFAC,,, | Performed by: ORTHOPAEDIC SURGERY

## 2018-10-16 NOTE — LETTER
October 16, 2018      Sarath Moise MD  [address]  [address]   [facility] [address]           [facility]  [address]  [address]  [facility] [address]  Phone: [phone]  Fax: [phone]          Patient: Thelma Roman   MR Number: [id number]   YOB: 1995   Date of Visit: 10/16/2018       Dear Dr. Sarath Moise:    Thank you for referring Thelma Roman to me for evaluation. Attached you will find relevant portions of my assessment and plan of care.    If you have questions, please do not hesitate to call me. I look forward to following Thelma Roman along with you.    Sincerely,    Lio Mahmood, DO    Enclosure  CC:  No Recipients    If you would like to receive this communication electronically, please contact [email or web address] or [phone] to request more information on [employer]. Link access.    For providers and/or their staff who would like to refer a patient to [facility], please contact us through our one-stop-shop provider referral line, [facility], at [phone].    If you feel you have received this communication in error or would no longer like to receive these types of communications, please e-mail [email or web address]

## 2018-10-16 NOTE — PROGRESS NOTES
CC: right knee pain    23 y.o. Female presents today for evaluation of her right knee pain. Patient reports that when she was 16 she had the same knee pain and was told by a doctor that it was just growing pains. Pt reports her pain is directly underneath her knee cap anteriorly. Patient noticed swelling in the front of her knee that never seems to goes away.   How long:Patient states that about a week ago she was getting out of a car and her leg went out to the side and she felt pain in her knee.  What makes it better: Patient states nothing helps with the pain.  What makes it worse: Patient reports that walking up stairs and putting pressure on that leg cause her the most pain. This is common for her to do lots of walking at work.  Does it radiate:Patient states that she has radiating pain down into her ankle.  Attempted treatments: Patient has tried bracing, resting, heat, and medication.   Pain score: she rates it as a 6/10 on the pain scale.  Any mechanical symptoms: Patient reports that she hears some popping and grinding in her knee.  Feelings of instability: Patient denies any feelings of instability.   Affect on ADLs: Patient reports she is in constant pain everyday.    REVIEW OF SYSTEMS:   Constitution: Patient denies fever, chills, night sweats, and weight changes.  Eyes: Patient denies eye pain or vision changes.  HENT: Patient denies ear pain, sore throat, or nasal discharge. Patient reports severe headaches daily. She says she gets migraines when her knee hurts.  CVS: Patient denies chest pain.  Lungs: Patient denies shortness of breath or cough.  Abd: Patient denies stomach pain, nausea, or vomiting.  Skin: Patient denies skin rash or itching.    Hematologic/Lymphatic: Patient reports she bruises easily.  Musculoskeletal: Patient denies recent falls. See HPI.  Psych: Patient is on anxiety medication.    PAST MEDICAL HISTORY:   Past Medical History:   Diagnosis Date    Anxiety     Depression   "      PAST SURGICAL HISTORY:   Past Surgical History:   Procedure Laterality Date    CARPAL TUNNEL RELEASE      FACIAL RECONSTRUCTION SURGERY      RELEASE-CARPAL TUNNEL Right 10/23/2015    [facility] Jr[facility]    TONSILLECTOMY         FAMILY HISTORY:   No family history on file.    SOCIAL HISTORY:   Social History     Socioeconomic History    Marital status: Single     Spouse name: Not on file    Number of children: Not on file    Years of education: Not on file    Highest education level: Not on file   Social Needs    Financial resource strain: Not on file    Food insecurity - worry: Not on file    Food insecurity - inability: Not on file    Transportation needs - medical: Not on file    Transportation needs - non-medical: Not on file   Occupational History    Not on file   Tobacco Use    Smoking status: Never Smoker    Smokeless tobacco: Never Used   Substance and Sexual Activity    Alcohol use: No    Drug use: No    Sexual activity: Yes   Other Topics Concern    Not on file   Social History Narrative    Attends [employer], majoring in psychology. She is an artist.       MEDICATIONS:     Current Outpatient Medications:     etonogestrel (NEXPLANON) 68 mg Impl, by Subdermal route., Disp: , Rfl:     sertraline (ZOLOFT) 100 MG tablet, Take 1 tablet (100 mg total) by mouth once daily., Disp: 30 tablet, Rfl: 11    ALLERGIES:   Review of patient's allergies indicates:  No Known Allergies     PHYSICAL EXAMINATION:  /78 (BP Location: Right arm, Patient Position: Sitting, BP Method: X-Large (Manual))   Ht 5' 9" (1.753 m)   Wt 86.2 kg (190 lb)   BMI 28.06 kg/m²   Vitals signs and nursing note have been reviewed.  General: In no acute distress, well developed, well nourished, no diaphoresis  Eyes: EOM full and smooth, no eye redness or discharge  HENT: normocephalic and atraumatic, neck supple, trachea midline, no nasal discharge, no external ear redness or " discharge  Cardiovascular: 2+ and symmetric DP pulses bilaterally, no LE edema  Lungs: respirations non-labored, no conversational dyspnea   Abd: non-distended, no rigidity  MSK: no amputation or deformity, no swelling of extremities  Neuro: AAOx3, CN2-12 grossly intact  Skin: No rashes, warm and dry  Psychiatric: cooperative, pleasant, mood and affect appropriate for age    MUSCULOSKELETAL EXAM:    RIGHT KNEE EXAMINATION   Affected side is compared to contralateral knee     Observation:  No edema, erythema, ecchymosis, or effusion noted.  No muscle atrophy of the thighs and calves noted.  No obvious bony deformities noted.     Tenderness:  Patella - mild    Lateral joint line - none  Quad tendon - none   Medial joint line - none  Patellar tendon - mild   Medial plica - none  Tibial tubercle - none   Lateral plica - none  Pes anserine - none   MCL prox - none  Distal ITB - none   MCL distal - none  MFC - none    LCL prox - none  LFC - none    LCL distal - none  Tibia - none    Fibula - none    No obvious bursae, plicae, popliteal cysts, or tendon derangement palpated.          ROM:   Active extension to 0° on left without hyperextension, lag, crepitus, or patellar J sign.   Active extension to 0° on right without hyperextension, lag, crepitus, or patellar J sign.   Active flexion to 135° on left and 135° on right.    Strength: (bilaterally)  Knee Flexion - 5/5 on left and 5/5 on right  Knee Extension - 5/5 on left and 5/5 on right    Patellofemoral Exam:  Patellar ballottement - negative  Bulge sign - negative  Patellar grind - negative  No patellar laxity with medial and lateral translation   Mild apprehension and discomfort with medial and lateral patellar translation.     Meniscus Testing:     Mild pain with terminal flexion. No pain with terminal extension.  Fernandos test - negative   Bounce home test - negative    Ligament Testing:  Lachman's test - negative  No laxity with anterior drawer.  No laxity with  posterior drawer.    No laxity with varus testing at 0 and 30 degrees.  No laxity with valgus testing at 0 and 30 degrees.    Neurovascular Examination:   Normal gait without antalgia.  Sensation intact to light touch in the obturator, lateral/intermediate/medial/posterior femoral cutaneous, saphenous, and common peroneal nerves bilaterally.  Pulses intact at the DP and PT arteries bilaterally.    Capillary refill intact <2 seconds in all toes bilaterally.      IMAGING:  No imaging obtained today    ASSESSMENT:      ICD-10-CM ICD-9-CM   1. Right knee pain, unspecified chronicity M25.561 719.46   2. Patellofemoral syndrome of right knee M22.2X1 719.46       PLAN:  1-2. Knee -  - Discussed options for PFS at this time, including icing, NSAIDs, bracing, and PT. At this time, Thelma is interested in doing some PT.  - Referral for PT sent for the [place] [facility]. Emphasize neuromuscular retraining, taping/bracing, and strengthening of quads. Emphasized HEP as a focal point of treatment to Thelma. She expressed understanding.  - Rx for a patellar J brace to be worn at work and school. She has a hinged knee brace, but this more specific one may be more beneficial for her. She is going to [place] at this time to get properly fitted for it.    Future planning includes - follow up after PT, if not better obtain XRs    All questions were answered to the best of my ability and all concerns were addressed at this time.    Follow up in 5-6 weeks for above, or sooner if needed.

## 2018-10-17 ENCOUNTER — PATIENT MESSAGE (OUTPATIENT)
Dept: SPORTS MEDICINE | Facility: CLINIC | Age: 23
End: 2018-10-17

## 2018-10-18 ENCOUNTER — TELEPHONE (OUTPATIENT)
Dept: INTERNAL MEDICINE | Facility: CLINIC | Age: 23
End: 2018-10-18

## 2018-10-18 ENCOUNTER — PATIENT MESSAGE (OUTPATIENT)
Dept: ORTHOPEDICS | Facility: CLINIC | Age: 23
End: 2018-10-18

## 2018-10-18 DIAGNOSIS — M25.561 RIGHT ANTERIOR KNEE PAIN: Primary | ICD-10-CM

## 2018-10-18 NOTE — TELEPHONE ENCOUNTER
----- Message from Emilia Marie sent at 10/18/2018  8:33 AM CDT -----  Spoke to patient this morning about a referral for her to see Pain Management. Pt stated she was seen by Orthopedics. Do she need this referral? Please advise.

## 2018-10-22 ENCOUNTER — TELEPHONE (OUTPATIENT)
Dept: ORTHOPEDICS | Facility: CLINIC | Age: 23
End: 2018-10-22

## 2018-10-22 ENCOUNTER — PATIENT MESSAGE (OUTPATIENT)
Dept: SPORTS MEDICINE | Facility: CLINIC | Age: 23
End: 2018-10-22

## 2018-10-22 NOTE — TELEPHONE ENCOUNTER
Spoke with patient to inform her her x rays were normal and see how she was feeling. Patient stated that her pain is now lateral on her knee but has decreased since her last visit. Patient was offered a prescription to help with the pain but declined it. Patient was instructed to ice and elevate at the end of the day when her knee is sore and to wear supportive shoes.

## 2018-10-22 NOTE — TELEPHONE ENCOUNTER
Spoke to Thelma and she is feeling better since Friday when she needed to leave work due to knee pain. Reviewed XR results. She denies wanting any antiinflammatories at this time. Continue with PT and follow up as scheduled.

## 2018-10-25 ENCOUNTER — PATIENT MESSAGE (OUTPATIENT)
Dept: ORTHOPEDICS | Facility: CLINIC | Age: 23
End: 2018-10-25

## 2018-11-16 ENCOUNTER — CLINICAL SUPPORT (OUTPATIENT)
Dept: REHABILITATION | Facility: HOSPITAL | Age: 23
End: 2018-11-16
Payer: COMMERCIAL

## 2018-11-16 DIAGNOSIS — M25.561 ACUTE PAIN OF RIGHT KNEE: ICD-10-CM

## 2018-11-16 PROCEDURE — 97161 PT EVAL LOW COMPLEX 20 MIN: CPT | Mod: PN

## 2018-11-16 NOTE — PLAN OF CARE
[facility]  Physical Therapy Initial Evaluation    Name: Thelma Roman  Clinic Number: [id number]    Therapy Diagnosis:   Encounter Diagnosis   Name Primary?    Acute pain of right knee      Physician: Lio Mahmood DO    Physician Orders: PT Eval and Treat; Neuromuscular retraining, balance training, patellar tracking strengthening and exercises, HEP, gait training, other modalities as seen fit per MD  Medical Diagnosis:   M25.561 (ICD-10-CM) - Right knee pain, unspecified chronicity   M22.2X1 (ICD-10-CM) - Patellofemoral syndrome of right knee     Evaluation Date: 11/16/2018  Authorization Period Expiration: 12/31/2018  Plan of Care Certification Period: 11/16/2018 to 01/5/2019  Visit # / Visits authorized: 1/ 50    Time In: 1520  Time Out: 1600  Total Billable Time: 40 minutes  Precautions: Standard    Subjective   Ms. Roman reports acute-on-chronic episode of R knee pain that occurred approximately one month ago. She reports that the knee pain has 'mostly gone away' at presents.  Reports intermittent bouts of R kneecap area pain since she was 16 years old (now 23 years old).  Describes the pain as low grade with minimal swelling.  Exacerbated by prolonged bouts of walking and standing.  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 Denies patellar subluxation episode.  Denies falling on her knee. Denies pain with stair negotiation.  History of severe ankle sprain on the R around the time of her initial onset of R knee pain when she was 16 years old.      Past Medical History:   Diagnosis Date    Anxiety     Depression      Thelma Roman  has a past surgical history that includes Facial reconstruction surgery; Tonsillectomy; Carpal tunnel release; and RELEASE-CARPAL TUNNEL (Right, 10/23/2015).    Thelma has a current medication list which includes the following prescription(s): etonogestrel and  sertraline.    Review of patient's allergies indicates:  No Known Allergies     Imaging: B knee Xrays  Prior Therapy: Issued PTO brace after her most recent bout of R knee pain 1 month ago.  States that the brace helped her knee pain until her knee stopped hurting and then the brace seemed to more irritating that helpful so she stopped wearing the brace. Has never done PT.   Social History: Lives at home.   Occupation: Full-time student and works in sales; on her feet a lot.  Prior Level of Function: intermittent bouts of knee pain.  Current Level of Function: fully independent.  Does not participate in exercise.     Pain:  Current 0/10, worst 3/10, best 0/10   Location: right knee   Description: Aching    Pts goals: She wants to know what is wrong with her knee and how to fix it so her pain will not come back again in the future.     Objective     Palpation: TTP along her medial joint line B (R knee pain more so than L).      Posture: stands with R hindfoot complex in slight varus, but L hindfoot complex is held in slight valgus during relaxed calcaneal stance. Level iliac crests.  Level popliteal angles. Wide pelvis.  Well-developed LE musculature.    Gross Movement Analysis:  - Gait:  Excessive R ankle/foot eversion during stance phase as compared to the L resulting in mild R knee genu valgus during stance phase on the right as compared to the L.  - Squats: DL:  Excessive anterior tibial translation with mild increase in genu valgum B knees       SL:  On the RLE, foot falls into eversion with tibial IR with significant increase in genu valgum    Range of Motion:   LE Right Left   Hip flexion WNL WNL   Hip abduction WNL WNL   Hip ER 50 degrees 50 degrees   Hip IR  30 degrees 30 degrees   Hip extension WNL WNL   Knee WNL WNL   Ankle DF WNL WNL     Lower Extremity Strength                 LE           Right           Left   Hip flexion: 5/5 5/5   Hip abduction 5/5  1 RM on hip machine= 7 plates 5/5  1 RM on hip  machine= 7 plates   Hip extension 5/5 5/5   Hip ER 5/5 5/5   Knee flexion 5/5 5/5   Knee extension 5/5  1 RM on knee machine= 80# 5/5  1 RM on knee machine= 75#   Ankle dorsiflexion: 5/5 5/5   Ankle plantarflexion: 5/5 5/5     Special Tests:   Left Right   Valgus Stress Test - at 0 and 30 degrees - at 0 and 30 degrees   Varus Stress test - at 0 and 30 degrees - at 0 and 30 degrees   Lachman's test - -   Posterior drawer - -   Anterior drawer - -   Frenando's Test - -  (incr'd tibial IR ROM)   Apley's Compression - -   Apley's Distraction NT NT   Allen's compression test - -   Thessaly's Test - -   Patellar Grind Test - - but with mild crepitus as compared to L     Joint Mobility: increased B patellar global mobility.   Sensation: intact light touch sensation to BLE throughout L2-S2 dermatomal pattern  Flexibility:    Ely's test: negative   Popliteal Angle: < 20 degrees B   Matheus test:negaitve   P/SLR test: <70 degrees B    Edema:  No R knee joint effusion or malinda-patellar edema appreciated    CMS Impairment/Limitation/Restriction for FOTO Knee Survey    Therapist reviewed FOTO scores for Thelma Roman on 11/16/2018.   FOTO documents entered into [employer] - see Media section.    Limitation Score: 47%  Predicated Limitation Score: 32%         TREATMENT   Treatment Time In: -  Treatment Time Out: -  Total Treatment time separate from Evaluation time:15'    THELMA received therapeutic exercises to develop strength, endurance and flexibility for - minutes including: see log below    DATE    VISIT    FOTO 1/5       4 way hip Next   Step-ups/downs Next (cueing required)   Leg press Next (cueing required)           INITIALS JH       Home Exercises and Patient Education Provided  Education provided re:   - PT diagnosis with recommended treatment course    Written Home Exercises Provided: NT.  Exercises were reviewed and THELMA was able to demonstrate them prior to the end of the session.   Pt received a written copy of  exercises to perform at home. THELMA demonstrated good  understanding of the education provided.     See EMR under patient instructions for exercises given.   Assessment   Thelma is a 23 y.o. female referred to outpatient Physical Therapy with a medical diagnosis of patellofemoral pain syndrome. Pt presents with relatively normal knee examination including ligament and meniscal testing.  No patellofemoral pain elicited today.  BLE strength good and equal as wells as ROM throughout hips, knees, and ankles. History of R ankle dysfunction in conjunction with R knee dysfunction.  Asymmetrical LE loading during single-leg closed chain activities with increased medial knee collapse on the R demonstrated as compared to the L.  Most recent R knee imaging reviewed.  Consistent with examination findings.      Pt prognosis is Excellent.   Pt will benefit from skilled outpatient Physical Therapy to address the deficits stated above and in the chart below, provide pt/family education, and to maximize pt's level of independence.     Plan of care discussed with patient: Yes  Pt's spiritual, cultural and educational needs considered and patient is agreeable to the plan of care and goals as stated below:     Anticipated Barriers for therapy: work/school schedule    Medical Necessity is demonstrated by the following  History  Co-morbidities and personal factors that may impact the plan of care Co-morbidities:   none    Personal Factors:   lifestyle     low   Examination  Body Structures and Functions, activity limitations and participation restrictions that may impact the plan of care Body Regions:   lower extremities    Body Systems:    gross coordinated movement  gait  motor control    Participation Restrictions:   Work/school schedule    Activity limitations:   Learning and applying knowledge  no deficits    General Tasks and Commands  no deficits    Communication  no deficits    Mobility  walking    Self care  no  deficits    Domestic Life  shopping    Interactions/Relationships  no deficits    Life Areas  higher education  employment    Community and Social Life  community life  recreation and leisure         moderate   Clinical Presentation stable and uncomplicated low   Decision Making/ Complexity Score: low     Goals:  Short Term Goals= Long Term Goals: 4-6 weeks  1.  Patient will demonstrate improved R knee position awareness with single leg closed chain therapeutic exercise and activity without verbal, visual, or tactile cueing.   2.  Patient will demonstrate improved R knee position control during descent phase of single-leg squat x 10 reps (no medial knee collapse.   3.  Patient will report < 32% limitation on Knee FOTO survey.       Plan   Certification Period/Plan of care expiration: 11/16/2018 to 01/05/2019.    Outpatient Physical Therapy 2 times weekly for 6 weeks to include the following interventions: Gait Training, Manual Therapy, Moist Heat/ Ice, Neuromuscular Re-ed, Patient Education, Therapeutic Activites and Therapeutic Exercise.     Ted Downey, PT

## 2018-11-18 PROBLEM — M25.561 ACUTE PAIN OF RIGHT KNEE: Status: ACTIVE | Noted: 2018-11-18

## 2018-11-26 ENCOUNTER — PATIENT MESSAGE (OUTPATIENT)
Dept: ORTHOPEDICS | Facility: CLINIC | Age: 23
End: 2018-11-26

## 2018-12-07 ENCOUNTER — OFFICE VISIT (OUTPATIENT)
Dept: SPORTS MEDICINE | Facility: CLINIC | Age: 23
End: 2018-12-07
Payer: COMMERCIAL

## 2018-12-07 VITALS
DIASTOLIC BLOOD PRESSURE: 86 MMHG | HEART RATE: 108 BPM | SYSTOLIC BLOOD PRESSURE: 128 MMHG | WEIGHT: 190 LBS | HEIGHT: 69 IN | BODY MASS INDEX: 28.14 KG/M2

## 2018-12-07 DIAGNOSIS — M21.41 PES PLANUS OF BOTH FEET: ICD-10-CM

## 2018-12-07 DIAGNOSIS — M22.2X1 PATELLOFEMORAL SYNDROME, RIGHT: ICD-10-CM

## 2018-12-07 DIAGNOSIS — M25.561 RIGHT KNEE PAIN, UNSPECIFIED CHRONICITY: Primary | ICD-10-CM

## 2018-12-07 DIAGNOSIS — M21.42 PES PLANUS OF BOTH FEET: ICD-10-CM

## 2018-12-07 PROCEDURE — 99999 PR PBB SHADOW E&M-EST. PATIENT-LVL III: CPT | Mod: PBBFAC,,, | Performed by: ORTHOPAEDIC SURGERY

## 2018-12-07 PROCEDURE — 99213 OFFICE O/P EST LOW 20 MIN: CPT | Mod: S$GLB,,, | Performed by: ORTHOPAEDIC SURGERY

## 2018-12-07 NOTE — PROGRESS NOTES
CC: right knee pain    23 y.o. Female presents today for follow up evaluation of her right knee pain.     How long: Patient states she feels the same as she did at her last visit. Patient did obtain a knee brace which helped at first but she has stopped wearing it because she did not feel like it was helping. She states her pain has not been bad enough to need to take medicine for pain. Patient states her pain comes and goes. She went to one visit for PT but did not go to any further. She does state at therapy they spoke about her ankle mechanics and adjusting those. She has worn an ankle brace recently when she states did help with her symptoms.  What makes it better: Patient states resting makes her feel better.  What makes it worse: Patient states she will feel the worst after walking and at the end of the day.  Does it radiate: Patient denies any radiating symptoms.   Attempted treatments: Patient went to PT once and states the PT told her to change the way she is walking and believes her problem lies in her ankle.   Pain score: Patient states her pain is 2/10 right now. At its worst her pain is 6/10.   History of trauma/injury: Patient denies any new trauma.   Affecting ADLs: Patient states if she needs to run she is unable due to pain. Patient does feel like she needs to take breaks at work.   Any mechanical symptoms: Patient states she still hears a grinding sound in her knee and a subtle crack similar to cracking fingers.   Feelings of instability: Patient denies any feelings of instability.      REVIEW OF SYSTEMS:   Constitution: Patient denies fever or chills.  Eyes: Patient denies eye pain or vision changes.  CVS: Patient denies chest pain.  Lungs: Patient denies shortness of breath or cough.  Skin: Patient denies skin rash or itching.    Musculoskeletal: Patient denies recent falls. See HPI.  Psych: Patient states she has anxiety and is on medication for this. She feels she has this well controlled.  "    PAST MEDICAL HISTORY:   Past Medical History:   Diagnosis Date    Anxiety     Depression        MEDICATIONS:     Current Outpatient Medications:     etonogestrel (NEXPLANON) 68 mg Impl, by Subdermal route., Disp: , Rfl:     sertraline (ZOLOFT) 100 MG tablet, Take 1 tablet (100 mg total) by mouth once daily., Disp: 30 tablet, Rfl: 11    ALLERGIES:   Review of patient's allergies indicates:  No Known Allergies     PHYSICAL EXAMINATION:  /86   Pulse 108   Ht 5' 9" (1.753 m)   Wt 86.2 kg (190 lb)   BMI 28.06 kg/m²   Vitals signs and nursing note have been reviewed.  General: In no acute distress, well developed, well nourished, no diaphoresis  Eyes: EOM full and smooth, no eye redness or discharge  HENT: normocephalic and atraumatic, neck supple, trachea midline, no nasal discharge  Cardiovascular: no LE edema  Lungs: respirations non-labored, no conversational dyspnea   Neuro: AAOx3, CN2-12 grossly intact  Skin: No rashes, warm and dry  Psychiatric: cooperative, pleasant, mood and affect appropriate for age    MUSCULOSKELETAL EXAM:    RIGHT KNEE EXAMINATION   Affected side is compared to contralateral knee     Observation:  No edema, erythema, ecchymosis, or effusion noted.  No muscle atrophy of the thighs and calves noted.  No obvious bony deformities noted.   No genu valgus/varum noted. No recurvatum noted.    No tibial internal/external torsion.    Pes planus present bilaterally  Medial ankle collapsing right worse than left.    ROM:   Active extension to 0° bilaterally without hyperextension, lag, crepitus, or patellar J sign.    Active flexion to 135° bilaterally.    Strength: (bilaterally)  Knee Flexion - 5/5  Knee Extension - 5/5  Hip Flexion - 5/5  Hip Extension - 5/5  Ankle dorsiflexion - 5/5  Ankle Plantarflexion - 5/5    Patellofemoral Exam:  Patella position - Neutral   Patellar ballottement - negative  Bulge sign - negative  Patellar grind - negative  No patellar laxity with medial and " lateral translation   No apprehension with medial and lateral patellar translation.     Neurovascular Examination:   Normal gait without antalgia.  Sensation intact to light touch in the obturator, lateral/intermediate/medial/posterior femoral cutaneous, saphenous, and common peroneal nerves bilaterally.  Pulses intact at the DP and PT arteries bilaterally.    Capillary refill intact <2 seconds in all toes bilaterally.      ASSESSMENT:      ICD-10-CM ICD-9-CM   1. Right knee pain, unspecified chronicity M25.561 719.46   2. Patellofemoral syndrome, right M22.2X1 719.46   3. Pes planus of both feet M21.41 734    M21.42        PLAN:  1-3. Right knee pain/PFS/pes planus    - Thelma has not done much for this since her past visit, as she had one visit with PT and has not been wearing the brace or doing her exercises. She states that work requires her to wear specific shoes and she feels like these are contributing to her pain as well.    - Recommend her to go and get OTC orthotics to help her maintain her arch as this is very likely contributing to her knee pain. In addition, she has an ankle brace that she wears sometimes, so I recommend that she wear this when symptomatic. Home exercises given to her today and advised her to do these 1-2 times daily. Once stronger, we can work our way out of the ankle brace.    - Note written for work requesting allowance of proper fitting shoes that provide her with better arch support. I am hopeful that they will allow this.    - Card given to her today for a custom shoe company to help her get fitted properly to help with her mechanics and anatomy.    Future planning includes - custom orthotics, try PT again if HEP does not help - will focus PT more on ankle strengthening and mechanics rather than just knee.    All questions were answered to the best of my ability and all concerns were addressed at this time.    Follow up in 8 weeks for above, or sooner if needed.

## 2018-12-07 NOTE — LETTER
[facility]  [facility]  [facility] [address]  Phone: [phone]     THELMA Roman was seen by Dr. Mahmood on 12/07/2018.     Please allow Thelma to wear her more supportive shoes while at work. She is recently suffering from ankle pain and a more supportive shoe will help to alleviate her discomfort.    Please do not hesitate to contact my office if there are any questions or concerns.    Sincerely,        Lio Mahmood, DO

## 2018-12-09 ENCOUNTER — PATIENT MESSAGE (OUTPATIENT)
Dept: SPORTS MEDICINE | Facility: CLINIC | Age: 23
End: 2018-12-09

## 2018-12-13 ENCOUNTER — PATIENT MESSAGE (OUTPATIENT)
Dept: ORTHOPEDICS | Facility: CLINIC | Age: 23
End: 2018-12-13

## 2018-12-30 ENCOUNTER — PATIENT MESSAGE (OUTPATIENT)
Dept: INTERNAL MEDICINE | Facility: CLINIC | Age: 23
End: 2018-12-30

## 2019-01-03 ENCOUNTER — PATIENT MESSAGE (OUTPATIENT)
Dept: SPORTS MEDICINE | Facility: CLINIC | Age: 24
End: 2019-01-03

## 2019-03-01 ENCOUNTER — PATIENT MESSAGE (OUTPATIENT)
Dept: INTERNAL MEDICINE | Facility: CLINIC | Age: 24
End: 2019-03-01

## 2019-03-02 ENCOUNTER — PATIENT MESSAGE (OUTPATIENT)
Dept: INTERNAL MEDICINE | Facility: CLINIC | Age: 24
End: 2019-03-02

## 2019-03-13 ENCOUNTER — TELEPHONE (OUTPATIENT)
Dept: FAMILY MEDICINE | Facility: CLINIC | Age: 24
End: 2019-03-13

## 2019-03-13 ENCOUNTER — PATIENT MESSAGE (OUTPATIENT)
Dept: INTERNAL MEDICINE | Facility: CLINIC | Age: 24
End: 2019-03-13

## 2019-03-13 ENCOUNTER — OFFICE VISIT (OUTPATIENT)
Dept: FAMILY MEDICINE | Facility: CLINIC | Age: 24
End: 2019-03-13
Payer: COMMERCIAL

## 2019-03-13 VITALS
BODY MASS INDEX: 28.58 KG/M2 | HEIGHT: 69 IN | SYSTOLIC BLOOD PRESSURE: 116 MMHG | WEIGHT: 193 LBS | DIASTOLIC BLOOD PRESSURE: 84 MMHG | RESPIRATION RATE: 16 BRPM | OXYGEN SATURATION: 98 % | HEART RATE: 81 BPM

## 2019-03-13 DIAGNOSIS — F33.1 MODERATE EPISODE OF RECURRENT MAJOR DEPRESSIVE DISORDER: ICD-10-CM

## 2019-03-13 DIAGNOSIS — F41.1 GENERALIZED ANXIETY DISORDER: Primary | ICD-10-CM

## 2019-03-13 DIAGNOSIS — Z23 NEED FOR TDAP VACCINATION: ICD-10-CM

## 2019-03-13 PROBLEM — M25.561 ACUTE PAIN OF RIGHT KNEE: Status: RESOLVED | Noted: 2018-11-18 | Resolved: 2019-03-13

## 2019-03-13 PROBLEM — J00 ACUTE NASOPHARYNGITIS: Status: RESOLVED | Noted: 2018-06-26 | Resolved: 2019-03-13

## 2019-03-13 PROCEDURE — 90715 TDAP VACCINE GREATER THAN OR EQUAL TO 7YO IM: ICD-10-PCS | Mod: S$GLB,,, | Performed by: FAMILY MEDICINE

## 2019-03-13 PROCEDURE — 90715 TDAP VACCINE 7 YRS/> IM: CPT | Mod: S$GLB,,, | Performed by: FAMILY MEDICINE

## 2019-03-13 PROCEDURE — 99999 PR PBB SHADOW E&M-EST. PATIENT-LVL IV: ICD-10-PCS | Mod: PBBFAC,,, | Performed by: FAMILY MEDICINE

## 2019-03-13 PROCEDURE — 90471 IMMUNIZATION ADMIN: CPT | Mod: S$GLB,,, | Performed by: FAMILY MEDICINE

## 2019-03-13 PROCEDURE — 99999 PR PBB SHADOW E&M-EST. PATIENT-LVL IV: CPT | Mod: PBBFAC,,, | Performed by: FAMILY MEDICINE

## 2019-03-13 PROCEDURE — 99214 OFFICE O/P EST MOD 30 MIN: CPT | Mod: 25,S$GLB,, | Performed by: FAMILY MEDICINE

## 2019-03-13 PROCEDURE — 90471 TDAP VACCINE GREATER THAN OR EQUAL TO 7YO IM: ICD-10-PCS | Mod: S$GLB,,, | Performed by: FAMILY MEDICINE

## 2019-03-13 PROCEDURE — 99214 PR OFFICE/OUTPT VISIT, EST, LEVL IV, 30-39 MIN: ICD-10-PCS | Mod: 25,S$GLB,, | Performed by: FAMILY MEDICINE

## 2019-03-13 RX ORDER — SERTRALINE HYDROCHLORIDE 50 MG/1
50 TABLET, FILM COATED ORAL DAILY
Qty: 90 TABLET | Refills: 0 | Status: SHIPPED | OUTPATIENT
Start: 2019-03-13 | End: 2019-05-07 | Stop reason: ALTCHOICE

## 2019-03-13 RX ORDER — SERTRALINE HYDROCHLORIDE 100 MG/1
100 TABLET, FILM COATED ORAL DAILY
Qty: 90 TABLET | Refills: 0 | Status: SHIPPED | OUTPATIENT
Start: 2019-03-13 | End: 2019-05-07 | Stop reason: SDUPTHER

## 2019-03-13 NOTE — ASSESSMENT & PLAN NOTE
HEMA-7 Score: 17  Interpretation: Severe Anxiety    - already of Zoloft 100 mg daily  - discussed increasing medication to 150 mg daily and up titrate until symptoms better controlled  - may consider supplementing with a mood stabilizer and discussed with patient  - discussed considering establishing with psychiatry if we continue to have issues or if treatment is not effective  - rec resume counseling  - counseling on effects of diet and exercise to assist in management of symptoms  - questions elicited and answered  - encouraged pt to contact me with any concerns

## 2019-03-13 NOTE — TELEPHONE ENCOUNTER
----- Message from Palak Marie sent at 3/13/2019 10:20 AM CDT -----  Contact: [phone]/ self   Patient called in regarding 3/13 office visit being cancelled. Pt really wanted to be seen today so she scheduled with  for 1pm.

## 2019-03-13 NOTE — LETTER
March 13, 2019      [address] [place]  [place]   [facility] [address]  Phone: [phone]  Fax: [phone]       Patient: Thelma Roman   YOB: 1995  Date of Visit: 03/13/2019    To Whom It May Concern:    PING Roman  was at [facility] on 03/13/2019.     Please excuse for 3/2/19 and 3/3/19.     She may return to work on 3/4/19 with no restrictions. If you have any questions or concerns, or if I can be of further assistance, please do not hesitate to contact me.    Sincerely,    Amy Molina, DO

## 2019-03-13 NOTE — PROGRESS NOTES
FAMILY MEDICINE    Patient Active Problem List   Diagnosis    Panic disorder without agoraphobia    Moderate episode of recurrent major depressive disorder    Generalized anxiety disorder       CC:   Chief Complaint   Patient presents with    Follow-up     ER about 2 weeks ago       SUBJECTIVE:  Thelma Roman   is a 23 y.o. female  - with anxiety and depression present for follow 2/27/19 ER visit for contusion of right knee and concerns for worsening depression and anxiety symptoms. PCP Dr. Moise is not available.     1. ER visit: Right knee contusion s/p fall  Pt was recently seen in the ER 2/27/19 for acute right knee pain that started s/p trip and fall at the bottom of a staircase (walking up stairs) about 45 mins prior to her ER visit. She was unable to bear weight s/p the incident with +abrasion was noted with normal ROM and no sensory issues. Pt was able able to bear weight and ambulate by the time she presented to the ER. Xrays were done of the knee and leg and no fracture or dislocation was noted.   Since ER visit, pt reports that knee has continued to improve. Still mild area of bruising but no swelling and ambulating well for work. 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 She missed work 2/27/19-3/3/19 and will need an excuse for the days that she missed. No other concerns with her knee    2. Depression/Anxiety  Age diagnosed: 15 yo     Prior treatments: Prozac, Celexa, Lexapro  Side effects of prior treatment: Prozac and Celexa not effective; Lexapro was effective but reports that she ran out of the Rx when she was between PCP's and actually feels that Zoloft has worked better    Current treatment: Zoloft 100 mg daily  Side effects of current treatment: reports that she is fearful that it is no longer effective due to symptoms of worsening anxiety, worrying and difficulty calming herself    Symptoms related: irritable, nervous and on edge, difficulty controlling  worrying  Panic attacks: 2 in the last 1 week    Hopelessness: denies  Sleep: denies  Suicidal thoughts: denies  Thoughts of self harm:denies  Thoughts of harm to others: denies  History of suicide attempts: denies  Family history of suicide:denies    Support system: friends and family  Counselor: has had counseling in the past and looking to restart          ROS: Review of Systems   Constitutional: Negative for activity change, appetite change, chills, fatigue and unexpected weight change.   HENT: Negative for hearing loss and rhinorrhea.    Eyes: Negative for visual disturbance.   Respiratory: Negative for chest tightness, shortness of breath and wheezing.    Cardiovascular: Negative for chest pain, palpitations and leg swelling.   Gastrointestinal: Negative for abdominal pain, blood in stool, constipation, diarrhea and vomiting.   Endocrine: Negative for polydipsia, polyphagia and polyuria.   Genitourinary: Negative for difficulty urinating, dysuria, hematuria and menstrual problem.   Musculoskeletal: Negative for arthralgias, joint swelling and neck pain.   Skin: Negative for color change.   Neurological: Positive for headaches. Negative for dizziness, weakness and light-headedness.   Psychiatric/Behavioral: Negative for dysphoric mood and sleep disturbance. The patient is nervous/anxious.        Past Medical History:   Diagnosis Date    Anxiety     Depression        Past Surgical History:   Procedure Laterality Date    CARPAL TUNNEL RELEASE      FACIAL RECONSTRUCTION SURGERY      RELEASE-CARPAL TUNNEL Right 10/23/2015    Performed by Rod Topete Jr., MD at [facility] OR    TONSILLECTOMY         ALLERGIES: Review of patient's allergies indicates:  No Known Allergies    MEDS:   Current Outpatient Medications:     etonogestrel (NEXPLANON) 68 mg Impl, by Subdermal route., Disp: , Rfl:     sertraline (ZOLOFT) 100 MG tablet, Take 1 tablet (100 mg total) by mouth once daily. Take with 50 mg dose = 150 mg daily,  "Disp: 90 tablet, Rfl: 0    sertraline (ZOLOFT) 50 MG tablet, Take 1 tablet (50 mg total) by mouth once daily. To take with 100 mg dos e= 150 mg, Disp: 90 tablet, Rfl: 0    OBJECTIVE:   Vitals:    03/13/19 1307   BP: 116/84   BP Location: Right arm   Patient Position: Sitting   BP Method: Large (Manual)   Pulse: 81   Resp: 16   SpO2: 98%   Weight: 87.5 kg (193 lb)   Height: 5' 9" (1.753 m)     Body mass index is 28.5 kg/m².    Physical Exam   Constitutional: No distress.   Neck: Neck supple.   Cardiovascular: Normal rate, regular rhythm, normal heart sounds and intact distal pulses. Exam reveals no gallop and no friction rub.   No murmur heard.  Pulmonary/Chest: Effort normal and breath sounds normal.   Musculoskeletal: She exhibits no edema.        Right knee: Normal. She exhibits normal range of motion, no swelling, no effusion and no erythema. No tenderness found.   Skin: Skin is warm.   Psychiatric: Her speech is normal and behavior is normal. Judgment and thought content normal.   Mood "anxious"  Affect full range, smiles, anxious         Depression Patient Health Questionnaire 3/13/2019 6/26/2018 4/17/2018 4/3/2018   In the last two weeks how often have you had little interest or pleasure in doing things 2 0 0 3   In the last two weeks how often have you felt down, depressed or hopeless 1 0 0 0   PHQ-2 Total Score 3 0 0 3   In the last two weeks how often have you had trouble falling or staying asleep, or sleeping too much 3 - - 2   In the last two weeks how often have you felt tired or having little energy 3 - - 3   In the last two weeks how often have you had a poor appetite or overeating 0 - - 1   In the last two weeks how often have you felt bad about yourself - or that you are a failure or have let yourself or your family down 1 - - 0   In the last two weeks how often have you had trouble concentrating on things, such as reading the newspaper or watching television 0 - - 0   In the last two weeks how " often have you been moving or speaking so slowly that other people could have noticed. Or the opposite - being so fidgety or restless that you have been moving around a lot more than usual. 0 - - 0   In the last two weeks how often have you had thoughts that you would be better off dead, or of hurting yourself 0 - - 0   If you checked off any problems, how difficult have these problems made it for you to do your work, take care of things at home or get along with other people? Very difficult - - Not difficult at all   Total Score 10 - - 9     HEMA-7 Score: 17  Interpretation: Severe Anxiety      PERTINENT RESULTS:   2/27/2019   XR KNEE 3 VIEW RIGHT    CLINICAL HISTORY:  Unspecified injury of unspecified lower leg, initial encounter    TECHNIQUE:  AP, lateral, and Merchant views of the right knee were performed.    FINDINGS:  There is no fracture, dislocation, or bony erosion.      Impression       As above.     2/27/2019   XR TIBIA FIBULA 2 VIEW RIGHT    CLINICAL HISTORY:  Unspecified injury of unspecified lower leg, initial encounter    TECHNIQUE:  AP and lateral views of the right tibia and fibula were performed.    FINDINGS:  There is no fracture, dislocation, or bony erosion.      Impression       As above.       ASSESSMENT:  Problem List Items Addressed This Visit        Psychiatric    Moderate episode of recurrent major depressive disorder    Current Assessment & Plan     PHQ9 = 10  - increasing Zoloft to 150 mg daily         Relevant Medications    sertraline (ZOLOFT) 50 MG tablet    sertraline (ZOLOFT) 100 MG tablet    Generalized anxiety disorder - Primary    Current Assessment & Plan     HEMA-7 Score: 17  Interpretation: Severe Anxiety    - already of Zoloft 100 mg daily  - discussed increasing medication to 150 mg daily and up titrate until symptoms better controlled  - may consider supplementing with a mood stabilizer and discussed with patient  - discussed considering establishing with psychiatry if we  continue to have issues or if treatment is not effective  - rec resume counseling  - counseling on effects of diet and exercise to assist in management of symptoms  - questions elicited and answered  - encouraged pt to contact me with any concerns         Relevant Medications    sertraline (ZOLOFT) 50 MG tablet    sertraline (ZOLOFT) 100 MG tablet      Other Visit Diagnoses     Need for Tdap vaccination        Relevant Orders    (In Office Administered) Tdap Vaccine (Completed)          PLAN:   Orders Placed This Encounter    (In Office Administered) Tdap Vaccine    sertraline (ZOLOFT) 50 MG tablet    sertraline (ZOLOFT) 100 MG tablet     Follow-up in 2-4 weeks.     Dr. Amy Molina D.O.   Family Medicine

## 2019-03-28 ENCOUNTER — PATIENT MESSAGE (OUTPATIENT)
Dept: FAMILY MEDICINE | Facility: CLINIC | Age: 24
End: 2019-03-28

## 2019-04-04 ENCOUNTER — OFFICE VISIT (OUTPATIENT)
Dept: FAMILY MEDICINE | Facility: CLINIC | Age: 24
End: 2019-04-04
Payer: COMMERCIAL

## 2019-04-04 VITALS
OXYGEN SATURATION: 96 % | SYSTOLIC BLOOD PRESSURE: 122 MMHG | WEIGHT: 197.13 LBS | BODY MASS INDEX: 29.2 KG/M2 | RESPIRATION RATE: 16 BRPM | HEIGHT: 69 IN | DIASTOLIC BLOOD PRESSURE: 70 MMHG | TEMPERATURE: 98 F | HEART RATE: 85 BPM

## 2019-04-04 DIAGNOSIS — F41.1 GENERALIZED ANXIETY DISORDER: ICD-10-CM

## 2019-04-04 DIAGNOSIS — F33.1 MODERATE EPISODE OF RECURRENT MAJOR DEPRESSIVE DISORDER: Primary | ICD-10-CM

## 2019-04-04 DIAGNOSIS — M25.861 PATELLOFEMORAL DYSFUNCTION OF RIGHT KNEE: ICD-10-CM

## 2019-04-04 DIAGNOSIS — G89.29 CHRONIC PAIN OF RIGHT KNEE: ICD-10-CM

## 2019-04-04 DIAGNOSIS — M25.561 CHRONIC PAIN OF RIGHT KNEE: ICD-10-CM

## 2019-04-04 PROBLEM — M25.371 ANKLE INSTABILITY, RIGHT: Status: ACTIVE | Noted: 2019-04-04

## 2019-04-04 PROCEDURE — 99999 PR PBB SHADOW E&M-EST. PATIENT-LVL V: ICD-10-PCS | Mod: PBBFAC,,, | Performed by: FAMILY MEDICINE

## 2019-04-04 PROCEDURE — 99214 PR OFFICE/OUTPT VISIT, EST, LEVL IV, 30-39 MIN: ICD-10-PCS | Mod: S$GLB,,, | Performed by: FAMILY MEDICINE

## 2019-04-04 PROCEDURE — 99999 PR PBB SHADOW E&M-EST. PATIENT-LVL V: CPT | Mod: PBBFAC,,, | Performed by: FAMILY MEDICINE

## 2019-04-04 PROCEDURE — 99214 OFFICE O/P EST MOD 30 MIN: CPT | Mod: S$GLB,,, | Performed by: FAMILY MEDICINE

## 2019-04-04 NOTE — LETTER
April 4, 2019      [address] [place]   [place] [address]  Phone: [phone]  Fax: [phone]       Patient: Thelma Roman   YOB: 1995  Date of Visit: 04/04/2019    To Whom It May Concern:    PING Roman  was at [facility] on 04/04/2019. She is a patient of mine and has been dealing with several medical issues that has made it difficult for her to attend her classes at time. We would appreciate your accommodation during this time. She will continue to have follow-up visits that she will need to make for physician visit and physical therapy.     If you have any questions or concerns, or if I can be of further assistance, please do not hesitate to contact me.    Sincerely,    Amy Molina, DO

## 2019-04-04 NOTE — PROGRESS NOTES
"FAMILY MEDICINE    Patient Active Problem List   Diagnosis    Panic disorder without agoraphobia    Chronic pain of right knee    Moderate episode of recurrent major depressive disorder    Generalized anxiety disorder    Patellofemoral dysfunction of right knee       CC:   Chief Complaint   Patient presents with    Follow-up     3 month follow up    Note for school       SUBJECTIVE:  Thelma Roman   is a 23 y.o. female  - with anxiety and depression present for follow-up of chronic right knee pain and anxiety/depression.    1. Right knee pain  Initially seen by me for this issues s/p ER visit 2/27/19 following a fall. LAST NOTE: "Pt was recently seen in the ER 2/27/19 for acute right knee pain that started s/p trip and fall at the bottom of a staircase (walking up stairs) about 45 mins prior to her ER visit. She was unable to bear weight s/p the incident with +abrasion was noted with normal ROM and no sensory issues. Pt was able able to bear weight and ambulate by the time she presented to the ER. Xrays were done of the knee and leg and no fracture or dislocation was noted.   Since ER visit, pt reports that knee has continued to improve. Still mild area of bruising but no swelling and ambulating well for work. 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 She missed work 2/27/19-3/3/19 and will need an excuse for the days that she missed. No other concerns with her knee"    Pt reports that knee pain has been chronic and was previously evaluated by Dr. Moise her prior PCP and was referred to Dr. [facility] and seen on 10/16/18. He evaluated the knee and discussed diagnosis of PFS and recommended icing, NSAIDs, and PT. She was referred to PT at [facility]. He also prescribed a right knee brace with hinges for support to use when she is at work. She had an Xray done 10/19/18 s/p persistent pain   10/19/2018   XR KNEE ORTHO RIGHT WITH FLEXION  CLINICAL HISTORY:  Pain in " "unspecified knee  TECHNIQUE:  AP standing as well as PA flexion standing and Merchant views of both knees were performed.  A lateral view of the right knee is also performed.  FINDINGS:  There is no fracture, dislocation, or bony erosion.  The joint spaces appear satisfactorily preserved.  IMPRESSION  As above.    She also had her PT evaluation on 11/16/18 and reports that she was told everything was fine "It was just my ankle" and was not scheduled for follow-up so she only attended the one session. However I reviewed 11/16/18 noted by PT Ted Downey from [place] and recommendation was for: "Outpatient Physical Therapy 2 times weekly for 6 weeks to include the following interventions: Gait Training, Manual Therapy, Moist Heat/ Ice, Neuromuscular Re-ed, Patient Education, Therapeutic Activites and Therapeutic Exercise."    She continued to have pain and had her follow-up with Dr. Mahmood on 12/27/18 who encouraged her to return to PT, but it appears that she did not. She is no longer wearing her knee brace "I am kind of all or nothing" noting that she would either wear the knee brace all  the time or not all. And reports that when she was instructed not to overuse the knee brace since it can lead to muscle weakness and persistent pain issues.     When she saw me 3/19/18 she reports that pain had improved and was doing well prior to her fall. Since the fall pain has worsened and she reports that she has missed several classes at school "I just can't make it to school because of the pain." She had to decreased her class hours and is a jeopardy of losing her financial aid.     2. Depression/Anxiety  Age diagnosed: 15 yo     Prior treatments: Prozac, Celexa, Lexapro  Side effects of prior treatment: Prozac and Celexa not effective; Lexapro was effective but reports that she ran out of the Rx when she was between PCP's and actually feels that Zoloft has worked better    Current treatment: Zoloft 150 mg daily - was " increased on 3/13/19  Side effects of current treatment: feeling very stressed at school and worried about losing her financial aid. Though has missed several classes from her knee pain does not appear to have missed work related with knee pain. She has done counseling in the past but stopped because she felt that she did not have time.     Symptoms related: irritable, nervous and on edge, difficulty controlling worrying  Panic attacks: 2-3 in last 1 week    Hopelessness: denies  Sleep: denies  Suicidal thoughts: denies  Thoughts of self harm:denies  Thoughts of harm to others: denies  History of suicide attempts: denies  Family history of suicide:denies    Support system: friends and family  Counselor: has had counseling in the past and looking to restart        ROS: Review of Systems   Constitutional: Negative for activity change, appetite change, chills, fatigue and unexpected weight change.   HENT: Negative for hearing loss, rhinorrhea and trouble swallowing.    Eyes: Negative for visual disturbance.   Respiratory: Negative for cough, chest tightness and shortness of breath.    Cardiovascular: Negative for chest pain, palpitations and leg swelling.   Gastrointestinal: Negative for abdominal pain, blood in stool, constipation, diarrhea and vomiting.   Endocrine: Negative for cold intolerance, heat intolerance, polydipsia, polyphagia and polyuria.   Genitourinary: Negative for difficulty urinating, dysuria, hematuria and menstrual problem.   Musculoskeletal: Positive for arthralgias and gait problem. Negative for back pain, joint swelling and neck pain.   Skin: Negative for color change.   Neurological: Positive for headaches. Negative for dizziness, weakness and light-headedness.   Psychiatric/Behavioral: Positive for dysphoric mood and sleep disturbance. The patient is nervous/anxious.        Past Medical History:   Diagnosis Date    Anxiety     Depression        Past Surgical History:   Procedure Laterality  "Date    CARPAL TUNNEL RELEASE      FACIAL RECONSTRUCTION SURGERY      RELEASE-CARPAL TUNNEL Right 10/23/2015    Per[facility] by Rod [facility]ete Jr[facility] at [facility] OR    TONSILLECTOMY       Social History     Social History Narrative    Lives with father. Astranged from her mother. Attends [employer], majoring in psychology with several minors. 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 [employer].          ALLERGIES: Review of patient's allergies indicates:  No Known Allergies    MEDS:   Current Outpatient Medications:     etonogestrel (NEXPLANON) 68 mg Impl, by Subdermal route., Disp: , Rfl:     sertraline (ZOLOFT) 100 MG tablet, Take 1 tablet (100 mg total) by mouth once daily. Take with 50 mg dose = 150 mg daily, Disp: 90 tablet, Rfl: 0    sertraline (ZOLOFT) 50 MG tablet, Take 1 tablet (50 mg total) by mouth once daily. To take with 100 mg dos e= 150 mg, Disp: 90 tablet, Rfl: 0    OBJECTIVE:   Vitals:    04/04/19 1402   BP: 122/70   BP Location: Left arm   Patient Position: Sitting   BP Method: Large (Manual)   Pulse: 85   Resp: 16   Temp: 98.2 °F (36.8 °C)   TempSrc: Oral   SpO2: 96%   Weight: 89.4 kg (197 lb 1.6 oz)   Height: 5' 9" (1.753 m)     Body mass index is 29.11 kg/m².    Physical Exam   Constitutional: No distress.   Neck: Neck supple. No thyromegaly present.   Cardiovascular: Normal rate, regular rhythm, normal heart sounds and intact distal pulses. Exam reveals no gallop and no friction rub.   No murmur heard.  Pulmonary/Chest: Effort normal and breath sounds normal.   Musculoskeletal: She exhibits no edema.        Right knee: Normal. She exhibits normal range of motion, no swelling, no effusion and no erythema. No tenderness found.        Left knee: Normal.        Right ankle: She exhibits normal range of motion, no swelling and normal pulse. No tenderness.        Left ankle: Normal.   Neurological: She is alert.   Skin: Skin is warm.   Psychiatric: Her speech is normal " "and behavior is normal. Judgment and thought content normal. She expresses no homicidal and no suicidal ideation.   Mood "worried"  Affect blunted but still smiles         Depression Patient Health Questionnaire 4/4/2019 3/13/2019 6/26/2018 4/17/2018 4/3/2018   In the last two weeks how often have you had little interest or pleasure in doing things 1 2 0 0 3   In the last two weeks how often have you felt down, depressed or hopeless 1 1 0 0 0   PHQ-2 Total Score 2 3 0 0 3   In the last two weeks how often have you had trouble falling or staying asleep, or sleeping too much - 3 - - 2   In the last two weeks how often have you felt tired or having little energy - 3 - - 3   In the last two weeks how often have you had a poor appetite or overeating - 0 - - 1   In the last two weeks how often have you felt bad about yourself - or that you are a failure or have let yourself or your family down - 1 - - 0   In the last two weeks how often have you had trouble concentrating on things, such as reading the newspaper or watching television - 0 - - 0   In the last two weeks how often have you been moving or speaking so slowly that other people could have noticed. Or the opposite - being so fidgety or restless that you have been moving around a lot more than usual. - 0 - - 0   In the last two weeks how often have you had thoughts that you would be better off dead, or of hurting yourself - 0 - - 0   If you checked off any problems, how difficult have these problems made it for you to do your work, take care of things at home or get along with other people? - Very difficult - - Not difficult at all   Total Score - 10 - - 9           PERTINENT RESULTS:   2/27/2019   XR KNEE 3 VIEW RIGHT    CLINICAL HISTORY:  Unspecified injury of unspecified lower leg, initial encounter    TECHNIQUE:  AP, lateral, and Merchant views of the right knee were performed.    FINDINGS:  There is no fracture, dislocation, or bony erosion.      Impression   "     As above.     2/27/2019   XR TIBIA FIBULA 2 VIEW RIGHT    CLINICAL HISTORY:  Unspecified injury of unspecified lower leg, initial encounter    TECHNIQUE:  AP and lateral views of the right tibia and fibula were performed.    FINDINGS:  There is no fracture, dislocation, or bony erosion.      Impression       As above.       ASSESSMENT:  Problem List Items Addressed This Visit        Psychiatric    Moderate episode of recurrent major depressive disorder - Primary    Current Assessment & Plan     - discussed concerns that poorly controlled anxiety and depression may be impeding her healing as well as ability to attend classes  - rec counseling and psychiatry evaluation and referred to both  - encouraged her to notify me with any issues  - rec close follow-up         Relevant Orders    Ambulatory Referral to Psychiatry    Ambulatory Referral to Psychology    Generalized anxiety disorder    Current Assessment & Plan     - discussed concerns that poorly controlled anxiety and depression may be impeding her healing as well as ability to attend classes  - rec counseling and psychiatry evaluation and referred to both  - encouraged her to notify me with any issues  - rec close follow-up         Relevant Orders    Ambulatory Referral to Psychiatry    Ambulatory Referral to Psychology       Orthopedic    Chronic pain of right knee    Current Assessment & Plan     - reviewed Dr. Mahmood's note and imaging  - discussed with pt and stress importance of PT  - stressed importance of PT and exercises to assist in reduction and control of her pain         Relevant Orders    Ambulatory Referral to Physical/Occupational Therapy    Patellofemoral dysfunction of right knee    Current Assessment & Plan     - stressed importance of PT and exercises to assist in reduction and control of her pain  - rec ice at end of day and okay to NSAIDs as needed         Relevant Orders    Ambulatory Referral to Physical/Occupational Therapy           PLAN:   Orders Placed This Encounter    Ambulatory Referral to Psychiatry    Ambulatory Referral to Psychology    Ambulatory Referral to Physical/Occupational Therapy       HPV vaccine series. Pt reports completed at 12 yo though not in her LINKS  Follow-up in 2-4 weeks.     Dr. Amy Molina D.O.   Family Medicine

## 2019-04-05 NOTE — ASSESSMENT & PLAN NOTE
- reviewed Dr. Mahmood's note and imaging  - discussed with pt and stress importance of PT  - stressed importance of PT and exercises to assist in reduction and control of her pain

## 2019-04-05 NOTE — ASSESSMENT & PLAN NOTE
- discussed concerns that poorly controlled anxiety and depression may be impeding her healing as well as ability to attend classes  - rec counseling and psychiatry evaluation and referred to both  - encouraged her to notify me with any issues  - rec close follow-up

## 2019-04-05 NOTE — ASSESSMENT & PLAN NOTE
- stressed importance of PT and exercises to assist in reduction and control of her pain  - rec ice at end of day and okay to NSAIDs as needed

## 2019-04-22 ENCOUNTER — CLINICAL SUPPORT (OUTPATIENT)
Dept: REHABILITATION | Facility: HOSPITAL | Age: 24
End: 2019-04-22
Attending: FAMILY MEDICINE
Payer: COMMERCIAL

## 2019-04-22 DIAGNOSIS — Z13.6 ENCOUNTER FOR LIPID SCREENING FOR CARDIOVASCULAR DISEASE: Primary | ICD-10-CM

## 2019-04-22 DIAGNOSIS — R29.898 WEAKNESS OF RIGHT LOWER EXTREMITY: ICD-10-CM

## 2019-04-22 DIAGNOSIS — Z13.220 ENCOUNTER FOR LIPID SCREENING FOR CARDIOVASCULAR DISEASE: Primary | ICD-10-CM

## 2019-04-22 PROCEDURE — 97110 THERAPEUTIC EXERCISES: CPT | Mod: PO

## 2019-04-22 PROCEDURE — 97161 PT EVAL LOW COMPLEX 20 MIN: CPT | Mod: PO

## 2019-04-22 NOTE — PLAN OF CARE
[facility]  Physical Therapy Initial Evaluation    Name: Thelma Roman  Clinic Number: [id number]    Therapy Diagnosis:   Encounter Diagnosis   Name Primary?    Weakness of right lower extremity      Physician: Amy Molina DO    Physician Orders: PT Eval and Treat   Medical Diagnosis from Referral:   M25.561,G89.29 (ICD-10-CM) - Chronic pain of right knee   M25.861 (ICD-10-CM) - Patellofemoral dysfunction of right knee     Evaluation Date: 4/22/2019  Authorization Period Expiration: 12/31/19  Plan of Care Expiration: 6/21/19  Visit # / Visits authorized: 1/ 30    Time In: 305 PM  Time Out: 355 PM  Total Billable Time: 50 minutes    Precautions: Standard    Subjective   Date of onset: February 2019  History of current condition - THELMA reports: she has had chronic knee pain for awhile. She slipped on her steps earlier this year and landed on her R knee. She went to the ER, and x-rays were taken. The x-rays were negative for fx. She states her pain has not gone back to baseline since the fall. Getting in/out of her car is very painful especially when twisting. Stairs are also very difficult. She does not report swelling at this time. No previous injections or alternative care. She states that she would like to bike or swim for exercise. Occasionally wears ankle brace.     Medical History:   Past Medical History:   Diagnosis Date    Anxiety     Depression        Surgical History:   Thelma Roman  has a past surgical history that includes Facial reconstruction surgery; Tonsillectomy; and Carpal tunnel release.    Medications:   Thelma has a current medication list which includes the following prescription(s): etonogestrel, sertraline, and sertraline.    Allergies:   Review of patient's allergies indicates:  No Known Allergies     Imaging see chart. No MRI taken.    Prior Therapy: Yes for her knee. Did not complete full episode.  Social History: Pt lives with their family. She lives  "in a two story home. Bedroom is upstairs.  Occupation: Pt works part time at [employer]. She does have to be on her feet a lot.  Prior Level of Function: able to work a full shift on her feet  Current Level of Function: unable to spend long periods on her feet    Pain:  Current 6/10, worst 10/10, best 4/10   Location: right knee   Description: Aching, Dull and with occasional sharp pain  Aggravating Factors: weather changes, raining, standing, being on her feet  Easing Factors: heat    Pts goals: "don't want to hurt"    Objective     Range of Motion:   Knee Left active Left Passive Right Active R passive   Flexion WNL WNL WNL WNL   Extension WNL WNL WNL WNL     Fair + R quad recruitment with quad set as compared to good on L leg    Mild extensor lag present with SLR on R LE    Lower Extremity Strength  Right LE  Left LE    Knee extension: 4+/5 Knee extension: 5/5   Knee flexion: 5/5 Knee flexion: 5/5   Hip flexion: 4+/5 Hip flexion: 5/5   Hip extension:  4/5 Hip extension: 4/5   Hip abduction: 4-/5 Hip abduction: 4-/5   Hip adduction: 4-/5 Hip adduction 4-/5   Ankle dorsiflexion: 5/5 Ankle dorsiflexion: 5/5   Ankle plantarflexion: 5/5 Ankle plantarflexion: 5/5       Special Tests:   Right   Valgus Stress Test -   Varus Stress test -   Lachman's test -   Posterior Lachman -   Lita's Test -   Patellar Grind Test -       Function:    - SLS R: 25 sec, slight ankle sway, significant increase in R knee pain  - SLS L: 25 sec, slight ankle sway  - Squat: crepitus present with squat, valgus knee collapse       Joint Mobility: normal patellar mobility and tibiofemoral P/A and A/P mobs    Palpation: + TTP R distal quad and patellar tendon, medial joint line,     Sensation: in tact    Slight reduction in calf and hamstring extensibility B    CMS Impairment/Limitation/Restriction for FOTO Knee Survey    Therapist reviewed FOTO scores for Thelma Roman on 4/22/2019.   FOTO documents entered into EPIC - see Media " section.    Limitation Score: 28%  Category: Mobility    Current : CJ = at least 20% but < 40% impaired, limited or restricted  Goal: CJ = at least 20% but < 40% impaired, limited or restricted           TREATMENT   Treatment Time In: 1040 AM  Treatment Time Out: 1055 AM  Total Treatment time separate from Evaluation: 15 minutes    THELMA received therapeutic exercises to develop strength, endurance, ROM, flexibility and core stabilization for 15 minutes including:  Clams YTB 2 x 10  Bridge YTB 2 x 10  Steamboats 3 directions YTB 2 x 10    Home Exercises and Patient Education Provided    Education provided:   - importance of hip strengthening    Written Home Exercises Provided: yes.  Exercises were reviewed and THELMA was able to demonstrate them prior to the end of the session.  THELMA demonstrated good  understanding of the education provided.     See EMR under Patient Instructions for exercises provided 4/22/2019.    Assessment   Thelma is a 23 y.o. female referred to outpatient Physical Therapy with a medical diagnosis of chronic R knee pain and patellofemoral dysfunction. Pt presents with decreased R hip and knee strength, poor body awareness with functional activities such as squatting, slight tenderness upon palpation of knee musculature, but an otherwise normal knee evaluation.    Pt prognosis is Fair.   Pt will benefit from skilled outpatient Physical Therapy to address the deficits stated above and in the chart below, provide pt/family education, and to maximize pt's level of independence.     Plan of care discussed with patient: Yes  Pt's spiritual, cultural and educational needs considered and patient is agreeable to the plan of care and goals as stated below:     Anticipated Barriers for therapy: work/school schedule      Medical Necessity is demonstrated by the following  History  Co-morbidities and personal factors that may impact the plan of care Co-morbidities:   Axiety, depression  Personal  Factors:   lifestyle     moderate   Examination  Body Structures and Functions, activity limitations and participation restrictions that may impact the plan of care Body Regions:   lower extremities    Body Systems:    gross coordinated movement  gait  motor control    Participation Restrictions:   Work/school schedule    Activity limitations:   Learning and applying knowledge  no deficits    General Tasks and Commands  no deficits    Communication  no deficits    Mobility  walking    Self care  no deficits    Domestic Life  shopping    Interactions/Relationships  no deficits    Life Areas  higher education  employment    Community and Social Life  community life  recreation and leisure         high   Clinical Presentation stable and uncomplicated low   Decision Making/ Complexity Score: low         Goals:  Short Term Goals:  4 weeks    - Pt will be able to perform a squat without therapist cues an no evidence of medial knee collapse in order to demo improved body awareness and hip strength.  -Pt will be able to bear weight on her R LE for at least 30 seconds with no increase in knee pain or pressure in order to help reduce pain with activities requiring single limb loading such as amb.  - Pt will be independent and consistent with issued HEP in order to show carryover between therapy sessions.    Long Term Goals: 8 weeks    - Pt will increase B hip abd strength by 1 ms grade in order to increase tolerance to functional activities and ADLs.  - Pt will report at CJ level (20-40% impaired) on FOTO knee survey score for knee pain disability to demonstrate an increase in LE function and mobility in home and community environment.   - Pt will be independent with updated HEP to maintain gains following discharge with therapy.        Plan   Plan of care Certification: 4/22/2019 to 6/21/19.    Outpatient Physical Therapy 2 times weekly for 8 weeks to include the following interventions: Manual Therapy, Moist Heat/ Ice,  Neuromuscular Re-ed, Patient Education and Therapeutic Exercise.     Ashley Holstein, WILMAR

## 2019-05-07 ENCOUNTER — OFFICE VISIT (OUTPATIENT)
Dept: FAMILY MEDICINE | Facility: CLINIC | Age: 24
End: 2019-05-07
Payer: COMMERCIAL

## 2019-05-07 VITALS
OXYGEN SATURATION: 99 % | HEIGHT: 69 IN | HEART RATE: 103 BPM | BODY MASS INDEX: 28.87 KG/M2 | WEIGHT: 194.88 LBS | TEMPERATURE: 98 F | DIASTOLIC BLOOD PRESSURE: 68 MMHG | SYSTOLIC BLOOD PRESSURE: 120 MMHG

## 2019-05-07 DIAGNOSIS — G89.29 CHRONIC PAIN OF RIGHT KNEE: ICD-10-CM

## 2019-05-07 DIAGNOSIS — F33.41 RECURRENT MAJOR DEPRESSIVE DISORDER, IN PARTIAL REMISSION: ICD-10-CM

## 2019-05-07 DIAGNOSIS — F41.0 PANIC DISORDER WITHOUT AGORAPHOBIA: Chronic | ICD-10-CM

## 2019-05-07 DIAGNOSIS — M25.861 PATELLOFEMORAL DYSFUNCTION OF RIGHT KNEE: ICD-10-CM

## 2019-05-07 DIAGNOSIS — F33.1 MODERATE EPISODE OF RECURRENT MAJOR DEPRESSIVE DISORDER: ICD-10-CM

## 2019-05-07 DIAGNOSIS — F41.1 GENERALIZED ANXIETY DISORDER: Primary | ICD-10-CM

## 2019-05-07 DIAGNOSIS — M25.561 CHRONIC PAIN OF RIGHT KNEE: ICD-10-CM

## 2019-05-07 DIAGNOSIS — Z11.3 SCREEN FOR STD (SEXUALLY TRANSMITTED DISEASE): ICD-10-CM

## 2019-05-07 PROBLEM — R29.898 LEG WEAKNESS: Status: RESOLVED | Noted: 2019-04-22 | Resolved: 2019-05-07

## 2019-05-07 PROCEDURE — 99999 PR PBB SHADOW E&M-EST. PATIENT-LVL III: CPT | Mod: PBBFAC,,, | Performed by: FAMILY MEDICINE

## 2019-05-07 PROCEDURE — 99999 PR PBB SHADOW E&M-EST. PATIENT-LVL III: ICD-10-PCS | Mod: PBBFAC,,, | Performed by: FAMILY MEDICINE

## 2019-05-07 PROCEDURE — 99214 OFFICE O/P EST MOD 30 MIN: CPT | Mod: S$GLB,,, | Performed by: FAMILY MEDICINE

## 2019-05-07 PROCEDURE — 99214 PR OFFICE/OUTPT VISIT, EST, LEVL IV, 30-39 MIN: ICD-10-PCS | Mod: S$GLB,,, | Performed by: FAMILY MEDICINE

## 2019-05-07 PROCEDURE — 87491 CHLMYD TRACH DNA AMP PROBE: CPT

## 2019-05-07 RX ORDER — SERTRALINE HYDROCHLORIDE 100 MG/1
100 TABLET, FILM COATED ORAL DAILY
Qty: 90 TABLET | Refills: 0 | Status: SHIPPED | OUTPATIENT
Start: 2019-05-07 | End: 2019-05-16

## 2019-05-07 NOTE — PROGRESS NOTES
"FAMILY MEDICINE    Patient Active Problem List   Diagnosis    Panic disorder without agoraphobia    Chronic pain of right knee    Recurrent major depressive disorder, in partial remission    Generalized anxiety disorder    Patellofemoral dysfunction of right knee       CC:   Chief Complaint   Patient presents with    Follow-up       SUBJECTIVE:  Thelma oRman   is a 23 y.o. female  - with anxiety and depression present for follow-up of chronic right knee pain and anxiety/depression. Last visit 4/4/19 and she was referred to PT and mental health.    She had evaluation with PT on 4/22/19 with recommendations for PT 2 times weekly. She reports improving and doing well.     1. Right knee pain    Prior notes: Initially seen by me for this issues s/p ER visit 2/27/19 following a fall. LAST NOTE: "Pt was recently seen in the ER 2/27/19 for acute right knee pain that started s/p trip and fall at the bottom of a staircase (walking up stairs) about 45 mins prior to her ER visit. She was unable to bear weight s/p the incident with +abrasion was noted with normal ROM and no sensory issues. Pt was able able to bear weight and ambulate by the time she presented to the ER. Xrays were done of the knee and leg and no fracture or dislocation was noted.   Since ER visit, pt reports that knee has continued to improve. Still mild area of bruising but no swelling and ambulating well for work. 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 She missed work 2/27/19-3/3/19 and will need an excuse for the days that she missed. No other concerns with her knee"  Pt reports that knee pain has been chronic and was previously evaluated by Dr. Moise her prior PCP and was referred to Dr. Lio Mahm[facility] and seen on 10/16/18. He evaluated the knee and discussed diagnosis of PFS and recommended icing, NSAIDs, and PT. She was referred to PT at [facility]. He also prescribed a right knee brace with hinges for " "support to use when she is at work. She had an Xray done 10/19/18 s/p persistent pain   10/19/2018   XR KNEE ORTHO RIGHT WITH FLEXION  CLINICAL HISTORY:  Pain in unspecified knee  TECHNIQUE:  AP standing as well as PA flexion standing and Merchant views of both knees were performed.  A lateral view of the right knee is also performed.  FINDINGS:  There is no fracture, dislocation, or bony erosion.  The joint spaces appear satisfactorily preserved.  IMPRESSION  As above.  She also had her PT evaluation on 11/16/18 and reports that she was told everything was fine "It was just my ankle" and was not scheduled for follow-up so she only attended the one session. However I reviewed 11/16/18 noted by PT Ted Downey from [facility] and recommendation was for: "Outpatient Physical Therapy 2 times weekly for 6 weeks to include the following interventions: Gait Training, Manual Therapy, Moist Heat/ Ice, Neuromuscular Re-ed, Patient Education, Therapeutic Activites and Therapeutic Exercise."  She continued to have pain and had her follow-up with Dr. Mahmood on 12/27/18 who encouraged her to return to PT, but it appears that she did not. She is no longer wearing her knee brace "I am kind of all or nothing" noting that she would either wear the knee brace all  the time or not all. And reports that when she was instructed not to overuse the knee brace since it can lead to muscle weakness and persistent pain issues.   When she saw me 3/19/18 she reports that pain had improved and was doing well prior to her fall. Since the fall pain has worsened and she reports that she has missed several classes at school "I just can't make it to school because of the pain." She had to decreased her class hours and is a jeopardy of losing her financial aid."    Today reports that still present but improving and doing exercise at home. No new issues    2. Depression/Anxiety  - reports that attempted to reach out to [facility] at [facility] without a response  Age diagnosed: 15 yo     Prior treatments: Prozac, Celexa, Lexapro  Side effects of prior treatment: Prozac and Celexa not effective; Lexapro was effective but reports that she ran out of the Rx when she was between PCP's and actually feels that Zoloft has worked better    Current treatment: Zoloft 150 mg daily - was increased on 3/13/19  Side effects of current treatment: denies    Symptoms related: well controlled and would like to decrease Zoloft back down to 100 mg daily  - reports new life changes and out of a prior relationship and has a new boyfriend  - also school will be ending in a few weeks and stressors will improve  Panic attacks: denies in last 3 weeks    Hopelessness: denies  Sleep: denies  Suicidal thoughts: denies  Thoughts of self harm:denies  Thoughts of harm to others: denies  History of suicide attempts: denies  Family history of suicide:denies    Support system: friends and family  Counselor: has had counseling in the past and looking to restart      ROS: Review of Systems   Constitutional: Negative for activity change, appetite change, fatigue, fever and unexpected weight change.   HENT: Negative for trouble swallowing and voice change.    Eyes: Negative for visual disturbance.   Respiratory: Negative for cough, chest tightness and shortness of breath.    Cardiovascular: Negative for chest pain, palpitations and leg swelling.   Gastrointestinal: Negative for abdominal pain, blood in stool, constipation, diarrhea, nausea and vomiting.   Endocrine: Negative for polydipsia, polyphagia and polyuria.   Genitourinary: Negative for difficulty urinating, menstrual problem, urgency and vaginal discharge.   Musculoskeletal: Positive for arthralgias. Negative for gait problem, joint swelling and myalgias.   Skin: Negative for rash.   Neurological: Negative for dizziness, weakness, light-headedness and headaches.   Psychiatric/Behavioral: Negative for dysphoric mood and sleep  "disturbance. The patient is not nervous/anxious.        Past Medical History:   Diagnosis Date    Anxiety     Depression        Past Surgical History:   Procedure Laterality Date    CARPAL TUNNEL RELEASE      FACIAL RECONSTRUCTION SURGERY      RELEASE-CARPAL TUNNEL Right 10/23/2015    [facility]    TONSILLECTOMY         ALLERGIES: Review of patient's allergies indicates:  No Known Allergies    MEDS:   Current Outpatient Medications:     etonogestrel (NEXPLANON) 68 mg Impl, by Subdermal route., Disp: , Rfl:     sertraline (ZOLOFT) 100 MG tablet, Take 1 tablet (100 mg total) by mouth once daily. Take with 50 mg dose = 150 mg daily, Disp: 90 tablet, Rfl: 0    OBJECTIVE:   Vitals:    05/07/19 1418   BP: 120/68   BP Location: Left arm   Patient Position: Sitting   BP Method: Large (Manual)   Pulse: 103   Temp: 98.4 °F (36.9 °C)   TempSrc: Oral   SpO2: 99%   Weight: 88.4 kg (194 lb 14.4 oz)   Height: 5' 9" (1.753 m)     Body mass index is 28.78 kg/m².    Physical Exam   Constitutional: No distress.   Neck: Neck supple. No thyromegaly present.   Cardiovascular: Normal rate, regular rhythm, normal heart sounds and intact distal pulses. Exam reveals no gallop and no friction rub.   No murmur heard.  Pulmonary/Chest: Effort normal and breath sounds normal.   Musculoskeletal: She exhibits no edema.   Neurological: She is alert.       Depression Patient Health Questionnaire 5/7/2019 4/4/2019 3/13/2019 6/26/2018 4/17/2018 4/3/2018   In the last two weeks how often have you had little interest or pleasure in doing things 0 1 2 0 0 3   In the last two weeks how often have you felt down, depressed or hopeless 0 1 1 0 0 0   PHQ-2 Total Score 0 2 3 0 0 3   In the last two weeks how often have you had trouble falling or staying asleep, or sleeping too much - - 3 - - 2   In the last two weeks how often have you felt tired or having little energy - - 3 - - 3   In the last two weeks how often " have you had a poor appetite or overeating - - 0 - - 1   In the last two weeks how often have you felt bad about yourself - or that you are a failure or have let yourself or your family down - - 1 - - 0   In the last two weeks how often have you had trouble concentrating on things, such as reading the newspaper or watching television - - 0 - - 0   In the last two weeks how often have you been moving or speaking so slowly that other people could have noticed. Or the opposite - being so fidgety or restless that you have been moving around a lot more than usual. - - 0 - - 0   In the last two weeks how often have you had thoughts that you would be better off dead, or of hurting yourself - - 0 - - 0   If you checked off any problems, how difficult have these problems made it for you to do your work, take care of things at home or get along with other people? - - Very difficult - - Not difficult at all   Total Score - - 10 - - 9     GAD7 5/7/2019   HEMA-7 Score 1       ASSESSMENT:  Problem List Items Addressed This Visit        Psychiatric    Panic disorder without agoraphobia (Chronic)    Current Assessment & Plan     - improved         Recurrent major depressive disorder, in partial remission    Current Assessment & Plan     - improved  - okay to trial back to Zoloft 100mg daily  - notify me of any issues         Relevant Medications    sertraline (ZOLOFT) 100 MG tablet    Generalized anxiety disorder - Primary    Current Assessment & Plan     - improved  - okay to trial back to Zoloft 100mg daily  - notify me of any issues         Relevant Medications    sertraline (ZOLOFT) 100 MG tablet       Orthopedic    Chronic pain of right knee    Current Assessment & Plan     - improved and rec completed PT and continue current exercises         Patellofemoral dysfunction of right knee    Current Assessment & Plan     - improved and rec completed PT and continue current exercises           Other Visit Diagnoses     Screen for STD  (sexually transmitted disease)        Relevant Orders    C. trachomatis/N. gonorrhoeae by [facility]; Urine          PLAN:   Orders Placed This Encounter    C. trachomatis/N. gonorrhoeae by [facility]; Urine    sertraline (ZOLOFT) 100 MG tablet     She will have lipids done today.   Provided further resources for mental health in the area.     Recommend HPV series. Pt declined    Follow-up in 3 months.     Dr. Amy Molina D.O.   Family Medicine

## 2019-05-07 NOTE — LETTER
May 7, 2019      [address] [place]   [place] [address]  Phone: [phone]  Fax: [phone]       Patient: Thelma Roman   YOB: 1995  Date of Visit: 05/07/2019    To Whom It May Concern:    PING Roman  was at [facility] on 05/07/2019. She may return to work/school on 05/08/2019 with no restrictions. If you have any questions or concerns, or if I can be of further assistance, please do not hesitate to contact me.    Sincerely,        Essence Shukla LPN

## 2019-05-08 ENCOUNTER — CLINICAL SUPPORT (OUTPATIENT)
Dept: REHABILITATION | Facility: HOSPITAL | Age: 24
End: 2019-05-08
Attending: FAMILY MEDICINE
Payer: COMMERCIAL

## 2019-05-08 DIAGNOSIS — G89.29 CHRONIC PAIN OF RIGHT KNEE: ICD-10-CM

## 2019-05-08 DIAGNOSIS — M25.561 CHRONIC PAIN OF RIGHT KNEE: ICD-10-CM

## 2019-05-08 DIAGNOSIS — M25.861 PATELLOFEMORAL DYSFUNCTION OF RIGHT KNEE: ICD-10-CM

## 2019-05-08 LAB
C TRACH DNA SPEC QL NAA+PROBE: NOT DETECTED
N GONORRHOEA DNA SPEC QL NAA+PROBE: NOT DETECTED

## 2019-05-08 PROCEDURE — 97110 THERAPEUTIC EXERCISES: CPT | Mod: PO

## 2019-05-08 NOTE — PROGRESS NOTES
"  Physical Therapy Daily Treatment Note     Name: Thelma Roman  Clinic Number: [id number]    Therapy Diagnosis:   Encounter Diagnoses   Name Primary?    Patellofemoral dysfunction of right knee     Chronic pain of right knee      Physician: Amy Molina DO    Visit Date: 5/8/2019    Physician Orders: PT Eval and Treat   Medical Diagnosis from Referral:   M25.561,G89.29 (ICD-10-CM) - Chronic pain of right knee   M25.861 (ICD-10-CM) - Patellofemoral dysfunction of right knee      Evaluation Date: 4/22/2019  Authorization Period Expiration: 12/31/19  Plan of Care Expiration: 6/21/19  Visit # / Visits authorized: 2/ 30     Time In: 2:00 PM  Time Out: 2:50 PM  Total Billable Time: 35 minutes     Precautions: Standard    Subjective     Pt reports: her knee is hurting today.   She was compliant with home exercise program.  Response to previous treatment: no adverse effects  Functional change: none     Pain: 6/10  Location: right knee      Objective     THELMA received therapeutic exercises to develop strength, endurance, ROM, flexibility and core stabilization for 35 minutes including:    Clams OTB 3 x 10  TrA + Unilateral clamshells OTB 2 x10   Bridge OTB 3x 10  Ball Squeeze 20 x 5"  Quad sets 20 x 5" attempted on L however reports of pain.   SL hip abduction 2 x 10   Steamboats 3 directions YTB 2 x 10  Shuttle 2.5 bands, OTB around knees 2x15  Forward lunge/weightshift 2 x 10   Lateral lunge/weightshift 2 x 10     THELMA received hot pack for 10 minutes to R knee.      Home Exercises Provided and Patient Education Provided     Education provided:   - cont current and new HEP    Written Home Exercises Provided: yes.  Exercises were reviewed and THELMA was able to demonstrate them prior to the end of the session.  THELMA demonstrated good  understanding of the education provided.     See EMR under Patient Instructions for exercises provided 5/8/2019.    Assessment     Patient with good tolerance to initial session post " eval. Patient with no reports of increased R knee pain throughout treatment however post treatment continues to report pain that she rates at a 6/10. Will continue to progress patient per her tolerance.  KODY is progressing well towards her goals.   Pt prognosis is Good.     Pt will continue to benefit from skilled outpatient physical therapy to address the deficits listed in the problem list box on initial evaluation, provide pt/family education and to maximize pt's level of independence in the home and community environment.     Pt's spiritual, cultural and educational needs considered and pt agreeable to plan of care and goals.     Anticipated barriers to physical therapy: work/school schedule    Goals:  Short Term Goals:  4 weeks     - Pt will be able to perform a squat without therapist cues an no evidence of medial knee collapse in order to demo improved body awareness and hip strength.  -Pt will be able to bear weight on her R LE for at least 30 seconds with no increase in knee pain or pressure in order to help reduce pain with activities requiring single limb loading such as amb.  - Pt will be independent and consistent with issued HEP in order to show carryover between therapy sessions.     Long Term Goals: 8 weeks     - Pt will increase B hip abd strength by 1 ms grade in order to increase tolerance to functional activities and ADLs.  - Pt will report at CJ level (20-40% impaired) on FOTO knee survey score for knee pain disability to demonstrate an increase in LE function and mobility in home and community environment.   - Pt will be independent with updated HEP to maintain gains following discharge with therapy.      Plan     Cont PT POC.     Georgiana Chi, PTA

## 2019-05-09 ENCOUNTER — PATIENT MESSAGE (OUTPATIENT)
Dept: FAMILY MEDICINE | Facility: CLINIC | Age: 24
End: 2019-05-09

## 2019-05-14 ENCOUNTER — PATIENT MESSAGE (OUTPATIENT)
Dept: FAMILY MEDICINE | Facility: CLINIC | Age: 24
End: 2019-05-14

## 2019-05-14 RX ORDER — SERTRALINE HYDROCHLORIDE 50 MG/1
TABLET, FILM COATED ORAL
Qty: 30 TABLET | Refills: 0 | OUTPATIENT
Start: 2019-05-14

## 2019-05-15 NOTE — TELEPHONE ENCOUNTER
I called patient and left a message to call office to be rescheduled if she's not on her way. Vf/ma

## 2019-05-16 ENCOUNTER — OFFICE VISIT (OUTPATIENT)
Dept: FAMILY MEDICINE | Facility: CLINIC | Age: 24
End: 2019-05-16
Payer: COMMERCIAL

## 2019-05-16 ENCOUNTER — TELEPHONE (OUTPATIENT)
Dept: FAMILY MEDICINE | Facility: CLINIC | Age: 24
End: 2019-05-16

## 2019-05-16 VITALS
DIASTOLIC BLOOD PRESSURE: 88 MMHG | WEIGHT: 189.31 LBS | HEIGHT: 69 IN | SYSTOLIC BLOOD PRESSURE: 114 MMHG | OXYGEN SATURATION: 97 % | HEART RATE: 88 BPM | TEMPERATURE: 98 F | BODY MASS INDEX: 28.04 KG/M2

## 2019-05-16 DIAGNOSIS — M25.861 PATELLOFEMORAL DYSFUNCTION OF RIGHT KNEE: ICD-10-CM

## 2019-05-16 DIAGNOSIS — F33.1 MODERATE EPISODE OF RECURRENT MAJOR DEPRESSIVE DISORDER: ICD-10-CM

## 2019-05-16 DIAGNOSIS — F41.1 GENERALIZED ANXIETY DISORDER: ICD-10-CM

## 2019-05-16 DIAGNOSIS — G89.29 CHRONIC PAIN OF RIGHT KNEE: Primary | ICD-10-CM

## 2019-05-16 DIAGNOSIS — M25.561 CHRONIC PAIN OF RIGHT KNEE: Primary | ICD-10-CM

## 2019-05-16 PROCEDURE — 99214 PR OFFICE/OUTPT VISIT, EST, LEVL IV, 30-39 MIN: ICD-10-PCS | Mod: S$GLB,,, | Performed by: FAMILY MEDICINE

## 2019-05-16 PROCEDURE — 99999 PR PBB SHADOW E&M-EST. PATIENT-LVL IV: CPT | Mod: PBBFAC,,, | Performed by: FAMILY MEDICINE

## 2019-05-16 PROCEDURE — 99999 PR PBB SHADOW E&M-EST. PATIENT-LVL IV: ICD-10-PCS | Mod: PBBFAC,,, | Performed by: FAMILY MEDICINE

## 2019-05-16 PROCEDURE — 99214 OFFICE O/P EST MOD 30 MIN: CPT | Mod: S$GLB,,, | Performed by: FAMILY MEDICINE

## 2019-05-16 NOTE — TELEPHONE ENCOUNTER
Spoke to [facility], script is $400. PA is not required. Patient has a deductible and PA will not make the price go down. We can try sending the script to [facility] where they can help find coupons to lower the price.

## 2019-05-16 NOTE — PATIENT INSTRUCTIONS
Week 1: Zoloft 50 mg daily and Trintellix 5 mg daily  Week 2: Zoloft 25 mg daily and Trintellix 5 mg daily  Week 3: Stop Zoloft and increase Trintellix 10 mg daily  Week 4: Trintellix 10 mg daily and see me

## 2019-05-16 NOTE — ASSESSMENT & PLAN NOTE
- continues to suffer from symptoms and has been unable to find a psychiatrist that will take her insurance  - has been on other SSRI and SNRI in the past without relief  - recommend crossing tapering from Zoloft to Trintellix  - instructions given to the patient   Week 1: Zoloft 50 mg daily and Trintellix 5 mg daily  Week 2: Zoloft 25 mg daily and Trintellix 5 mg daily  Week 3: Stop Zoloft and increase Trintellix 10 mg daily   Week 4: continue trintellix 10 mg daily and see me  - will start prior authorization  - counseling regarding new medications including expected results, potential side effects, and appropriate use. Questions elicited and answered  - notify me with any concerns

## 2019-05-16 NOTE — LETTER
May 16, 2019      [address] [place]   [facility] [address]  Phone: [phone]  Fax: [phone]       Patient: Thelma Roman   YOB: 1995  Date of Visit: 05/16/2019    To Whom It May Concern:    PING Roman  was at [facility] on 05/16/2019. Please excuse for   ________. She is able to return to work/school on ___________.     If you have any questions or concerns, or if I can be of further assistance, please do not hesitate to contact me.    Sincerely,    Amy Molina, DO

## 2019-05-16 NOTE — TELEPHONE ENCOUNTER
----- Message from Amy Molina DO sent at 5/16/2019  2:56 PM CDT -----  Please start prior auth for [employer] and let me complete and sign it

## 2019-05-16 NOTE — PROGRESS NOTES
"FAMILY MEDICINE    Patient Active Problem List   Diagnosis    Chronic pain of right knee    Moderate episode of recurrent major depressive disorder    Generalized anxiety disorder    Patellofemoral dysfunction of right knee       CC:   Chief Complaint   Patient presents with    Knee Pain     pt states she also felt sick       SUBJECTIVE:  Thelma Roman   is a 23 y.o. female  with anxiety and depression present for acute on chronic right knee pain. She was last seen 5/7/19 and reported that pain had improved. 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    Pain has improved and back to baseline 3/10. URI with congestion, sore throat,cough and loose stool now resolved since 5/15/19.         1. Right knee pain   - She had evaluation with PT on 4/22/19 with recommendations for PT 2 times weekly but has been unable to participate consistently due to work and school session. She has had only one session on 5/8/19.     Prior notes: Initially seen by me for this issues s/p ER visit 2/27/19 following a fall. LAST NOTE: "Pt was recently seen in the ER 2/27/19 for acute right knee pain that started s/p trip and fall at the bottom of a staircase (walking up stairs) about 45 mins prior to her ER visit. She was unable to bear weight s/p the incident with +abrasion was noted with normal ROM and no sensory issues. Pt was able able to bear weight and ambulate by the time she presented to the ER. Xrays were done of the knee and leg and no fracture or dislocation was noted.   Since ER visit, pt reports that knee has continued to improve. Still mild area of bruising but no swelling and ambulating well for work. 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 She missed work 2/27/19-3/3/19 and will need an excuse for the days that she missed. No other concerns with her knee"  Pt reports that knee pain has been chronic and was previously evaluated " "by Dr. Moise her prior PCP and was referred to Dr. Lio [facility] and seen on 10/16/18. He evaluated the knee and discussed diagnosis of PFS and recommended icing, NSAIDs, and PT. She was referred to PT at [facility]. He also prescribed a right knee brace with hinges for support to use when she is at work. She had an Xray done 10/19/18 s/p persistent pain   10/19/2018   XR KNEE ORTHO RIGHT WITH FLEXION  CLINICAL HISTORY:  Pain in unspecified knee  TECHNIQUE:  AP standing as well as PA flexion standing and Merchant views of both knees were performed.  A lateral view of the right knee is also performed.  FINDINGS:  There is no fracture, dislocation, or bony erosion.  The joint spaces appear satisfactorily preserved.  IMPRESSION  As above.  She also had her PT evaluation on 11/16/18 and reports that she was told everything was fine "It was just my ankle" and was not scheduled for follow-up so she only attended the one session. However I reviewed 11/16/18 noted by PT Ted Downey from [place] and recommendation was for: "Outpatient Physical Therapy 2 times weekly for 6 weeks to include the following interventions: Gait Training, Manual Therapy, Moist Heat/ Ice, Neuromuscular Re-ed, Patient Education, Therapeutic Activites and Therapeutic Exercise."  She continued to have pain and had her follow-up with Dr. Mahmood on 12/27/18 who encouraged her to return to PT, but it appears that she did not. She is no longer wearing her knee brace "I am kind of all or nothing" noting that she would either wear the knee brace all  the time or not all. And reports that when she was instructed not to overuse the knee brace since it can lead to muscle weakness and persistent pain issues.   When she saw me 3/19/18 she reports that pain had improved and was doing well prior to her fall. Since the fall pain has worsened and she reports that she has missed several classes at school "I just can't make it to school because of the " "pain." She had to decreased her class hours and is a jeopardy of losing her financial aid."    2. Depression and anxiety  - she has had difficulty finding and counselor or psychiatrist though I have give her a list of possible providers. Last visit she noted that symptoms had improved and she was hopeful that it would continue to improve since school would be ending for the summer  - reports that attempted to reach out to [facility] without a response  - continues to struggle with refractory symptoms  - depression > anxiety    Age diagnosed: 15 yo      Prior treatments: Prozac, Celexa, Lexapro  Side effects of prior treatment: Not effective     Current treatment: Zoloft 100 mg daily  Side effects of current treatment: not effective     Symptoms related:negative thoughts about self, decreased pleasures in daily activities  Panic attacks: denies     Hopelessness: denies  Sleep: denies  Suicidal thoughts: denies  Thoughts of self harm:denies  Thoughts of harm to others: denies  History of suicide attempts: denies  Family history of suicide:denies     Support system: friends and family  Counselor: has had counseling in the past and looking to restart      ROS: Review of Systems   Constitutional: Positive for fatigue. Negative for activity change, appetite change, fever and unexpected weight change.   HENT: Negative for congestion, hearing loss, postnasal drip, rhinorrhea, sinus pressure, sinus pain and trouble swallowing.    Eyes: Negative for photophobia and visual disturbance.   Respiratory: Negative for chest tightness and wheezing.    Cardiovascular: Positive for palpitations. Negative for chest pain and leg swelling.   Gastrointestinal: Negative for blood in stool, constipation, diarrhea and vomiting.   Endocrine: Negative for cold intolerance, heat intolerance, polydipsia, polyphagia and polyuria.   Genitourinary: Negative for difficulty urinating, dysuria, hematuria and menstrual " "problem.   Musculoskeletal: Positive for arthralgias. Negative for gait problem, joint swelling and neck pain.   Neurological: Negative for dizziness, weakness, light-headedness and headaches.   Psychiatric/Behavioral: Positive for dysphoric mood. Negative for sleep disturbance. The patient is nervous/anxious.        Past Medical History:   Diagnosis Date    Anxiety     Depression     Panic disorder without agoraphobia 9/16/2015       Past Surgical History:   Procedure Laterality Date    CARPAL TUNNEL RELEASE      FACIAL RECONSTRUCTION SURGERY      RELEASE-CARPAL TUNNEL Right 10/23/2015    [facility]    TONSILLECTOMY         ALLERGIES: Review of patient's allergies indicates:  No Known Allergies    MEDS:   Current Outpatient Medications:     etonogestrel (NEXPLANON) 68 mg Impl, by Subdermal route., Disp: , Rfl:     vortioxetine (TRINTELLIX) 5 mg Tab, Take 5 mg by mouth once daily for 7 days, THEN 10 mg once daily for 23 days., Disp: 37 tablet, Rfl: 0    OBJECTIVE:   Vitals:    05/16/19 1431   BP: 114/88   BP Location: Left arm   Patient Position: Sitting   BP Method: Medium (Manual)   Pulse: 88   Temp: 98 °F (36.7 °C)   TempSrc: Oral   SpO2: 97%   Weight: 85.9 kg (189 lb 4.8 oz)   Height: 5' 9" (1.753 m)     Body mass index is 27.95 kg/m².    Physical Exam   Constitutional: No distress.   HENT:   Right Ear: Tympanic membrane and ear canal normal.   Left Ear: Tympanic membrane and ear canal normal.   Nose: Nose normal.   Neck: Neck supple.   Cardiovascular: Normal rate, regular rhythm, normal heart sounds and intact distal pulses. Exam reveals no gallop and no friction rub.   No murmur heard.  Pulmonary/Chest: Effort normal and breath sounds normal.   Musculoskeletal: She exhibits no edema.        Right knee: Normal.        Left knee: Normal.   Neurological: She is alert.   Psychiatric: Her speech is normal and behavior is normal. Thought content normal.   Mood " ""okay"  Affect blunted         Depression Patient Health Questionnaire 5/16/2019 5/7/2019 4/4/2019 3/13/2019 6/26/2018 4/17/2018 4/3/2018   In the last two weeks how often have you had little interest or pleasure in doing things 2 0 1 2 0 0 3   In the last two weeks how often have you felt down, depressed or hopeless 2 0 1 1 0 0 0   PHQ-2 Total Score 4 0 2 3 0 0 3   In the last two weeks how often have you had trouble falling or staying asleep, or sleeping too much 0 - - 3 - - 2   In the last two weeks how often have you felt tired or having little energy 2 - - 3 - - 3   In the last two weeks how often have you had a poor appetite or overeating 2 - - 0 - - 1   In the last two weeks how often have you felt bad about yourself - or that you are a failure or have let yourself or your family down 0 - - 1 - - 0   In the last two weeks how often have you had trouble concentrating on things, such as reading the newspaper or watching television 0 - - 0 - - 0   In the last two weeks how often have you been moving or speaking so slowly that other people could have noticed. Or the opposite - being so fidgety or restless that you have been moving around a lot more than usual. 0 - - 0 - - 0   In the last two weeks how often have you had thoughts that you would be better off dead, or of hurting yourself 0 - - 0 - - 0   If you checked off any problems, how difficult have these problems made it for you to do your work, take care of things at home or get along with other people? Somewhat difficult - - Very difficult - - Not difficult at all   Total Score 8 - - 10 - - 9       GAD7 5/7/2019   HEMA-7 Score 1     ASSESSMENT:  Problem List Items Addressed This Visit        Psychiatric    Moderate episode of recurrent major depressive disorder    Current Assessment & Plan     - continues to suffer from symptoms and has been unable to find a psychiatrist that will take her insurance  - has been on other SSRI and SNRI in the past without " relief  - recommend crossing tapering from Zoloft to Trintellix  - instructions given to the patient   Week 1: Zoloft 50 mg daily and Trintellix 5 mg daily  Week 2: Zoloft 25 mg daily and Trintellix 5 mg daily  Week 3: Stop Zoloft and increase Trintellix 10 mg daily   Week 4: continue trintellix 10 mg daily and see me  - will start prior authorization  - counseling regarding new medications including expected results, potential side effects, and appropriate use. Questions elicited and answered  - notify me with any concerns         Relevant Medications    vortioxetine (TRINTELLIX) 5 mg Tab    Generalized anxiety disorder    Current Assessment & Plan     - stable         Relevant Medications    vortioxetine (TRINTELLIX) 5 mg Tab       Orthopedic    Chronic pain of right knee - Primary    Current Assessment & Plan     - continue with PT and exercises         Patellofemoral dysfunction of right knee    Current Assessment & Plan     - continue with PT and exercises               PLAN:   Orders Placed This Encounter    vortioxetine (TRINTELLIX) 5 mg Tab     URI resolved. Okay to return to work.   Follow-up in 3-4 weeks.     Dr. Amy Molina D.O.   Family Medicine

## 2019-05-17 ENCOUNTER — PATIENT MESSAGE (OUTPATIENT)
Dept: FAMILY MEDICINE | Facility: CLINIC | Age: 24
End: 2019-05-17

## 2019-05-17 NOTE — TELEPHONE ENCOUNTER
I notified patient of patient assistance program and coupon on [facility]

## 2019-05-25 ENCOUNTER — PATIENT MESSAGE (OUTPATIENT)
Dept: FAMILY MEDICINE | Facility: CLINIC | Age: 24
End: 2019-05-25

## 2019-06-09 ENCOUNTER — PATIENT MESSAGE (OUTPATIENT)
Dept: FAMILY MEDICINE | Facility: CLINIC | Age: 24
End: 2019-06-09

## 2019-06-10 ENCOUNTER — OFFICE VISIT (OUTPATIENT)
Dept: FAMILY MEDICINE | Facility: CLINIC | Age: 24
End: 2019-06-10
Payer: COMMERCIAL

## 2019-06-10 VITALS
BODY MASS INDEX: 27.9 KG/M2 | WEIGHT: 188.38 LBS | SYSTOLIC BLOOD PRESSURE: 118 MMHG | OXYGEN SATURATION: 98 % | HEIGHT: 69 IN | TEMPERATURE: 98 F | DIASTOLIC BLOOD PRESSURE: 78 MMHG | HEART RATE: 84 BPM

## 2019-06-10 DIAGNOSIS — G43.009 MIGRAINE WITHOUT AURA AND WITHOUT STATUS MIGRAINOSUS, NOT INTRACTABLE: Primary | ICD-10-CM

## 2019-06-10 DIAGNOSIS — F41.1 GENERALIZED ANXIETY DISORDER: ICD-10-CM

## 2019-06-10 PROCEDURE — 99214 PR OFFICE/OUTPT VISIT, EST, LEVL IV, 30-39 MIN: ICD-10-PCS | Mod: 25,S$GLB,, | Performed by: NURSE PRACTITIONER

## 2019-06-10 PROCEDURE — 96372 PR INJECTION,THERAP/PROPH/DIAG2ST, IM OR SUBCUT: ICD-10-PCS | Mod: S$GLB,,, | Performed by: NURSE PRACTITIONER

## 2019-06-10 PROCEDURE — 99214 OFFICE O/P EST MOD 30 MIN: CPT | Mod: 25,S$GLB,, | Performed by: NURSE PRACTITIONER

## 2019-06-10 PROCEDURE — 99999 PR PBB SHADOW E&M-EST. PATIENT-LVL IV: CPT | Mod: PBBFAC,,, | Performed by: NURSE PRACTITIONER

## 2019-06-10 PROCEDURE — 96372 THER/PROPH/DIAG INJ SC/IM: CPT | Mod: S$GLB,,, | Performed by: NURSE PRACTITIONER

## 2019-06-10 PROCEDURE — 99999 PR PBB SHADOW E&M-EST. PATIENT-LVL IV: ICD-10-PCS | Mod: PBBFAC,,, | Performed by: NURSE PRACTITIONER

## 2019-06-10 RX ORDER — KETOROLAC TROMETHAMINE 30 MG/ML
30 INJECTION, SOLUTION INTRAMUSCULAR; INTRAVENOUS ONCE
Status: COMPLETED | OUTPATIENT
Start: 2019-06-10 | End: 2019-06-10

## 2019-06-10 RX ORDER — SUMATRIPTAN 50 MG/1
50 TABLET, FILM COATED ORAL
Qty: 15 TABLET | Refills: 0 | Status: SHIPPED | OUTPATIENT
Start: 2019-06-10 | End: 2019-08-07

## 2019-06-10 RX ORDER — SERTRALINE HYDROCHLORIDE 100 MG/1
TABLET, FILM COATED ORAL
COMMUNITY
End: 2019-10-17 | Stop reason: SDUPTHER

## 2019-06-10 RX ORDER — KETOROLAC TROMETHAMINE 30 MG/ML
30 INJECTION, SOLUTION INTRAMUSCULAR; INTRAVENOUS
Status: DISCONTINUED | OUTPATIENT
Start: 2019-06-10 | End: 2019-06-10

## 2019-06-10 RX ADMIN — KETOROLAC TROMETHAMINE 30 MG: 30 INJECTION, SOLUTION INTRAMUSCULAR; INTRAVENOUS at 04:06

## 2019-06-10 NOTE — PATIENT INSTRUCTIONS
"  Migraine Headache: Stages and Treatment    A migraine headache tends to progress in stages. Learning these stages can help you better understand what is happening. Then you can learn ways to reduce pain and relieve other symptoms. Methods for relieving your symptoms include self-care and medicines.  Migraine stages  Migraines tend to progress through 4 stages. Many people don't have all stages, and stages may differ with each headache:  · Prodrome. A few hours to a day or so before the headache, you may feel tired, (yawning many times), uneasy, or cochran. You may also feel bloated or crave certain foods.  · Aura. Up to an hour before the headache starts, some migraine sufferers experience aura--flashing lights, blind spots, other vision problems, confusion, difficulty speaking, or other neurologic symptoms.  · Headache. Moderate to severe pain affects one side of the head and then can spread to both sides, often along with nausea. You may be highly sensitive to light, sound, and odors. Vomiting or diarrhea may also happen. This stage lasts 4 to 72 hours.  · Postdrome. After your headache ends, you may feel tired, achy, and "washed out." This may last for a day or so.  Self-care during a migraine  Here is what you can do:  · Use a cold compress. Wrap a thin cloth around a cold pack, a cold can of soda, or a bag of frozen vegetables. Apply this to your temple or other pain site.  · Drink fluids. If nausea makes it hard to drink, try sucking on ice.  · Rest. If possible, lie down. Try not to bend over, as this may increase your pain. Sometimes laying in a dark quiet room can help the migraine from being aggravated.    · Try caffeine. Some people find that drinking fluids with caffeine, such as coffee or tea, helps to lessen migraine pain.  Using medicines  Work with your healthcare provider to find the right medicines for you. Medicines for migraine may relieve pain (analgesics), relieve nausea, or attack the " migraine's root causes (migraine-specific medicines).  Rebound headache  Taking analgesics each day, or even several times a week, may lead to more frequent and severe headaches. These are called rebound headaches. If you think you're having rebound headaches, tell your healthcare provider. He or she can help you safely decrease your medicine. Rebound caffeine withdrawal headaches can also happen.    Date Last Reviewed: 10/9/2015  © 2736-2528 [employer]. [address] [place] [address], [place] [address]. All rights reserved. This information is not intended as a substitute for professional medical care. Always follow your healthcare professional's instructions.

## 2019-06-10 NOTE — LETTER
Jazmyn 10, 2019      [address] [address]  [place] [address]  Phone: [phone]  Fax: [phone]       Patient: Thelma Roman   YOB: 1995  Date of Visit: 06/10/2019    To Whom It May Concern:    PING Roman  was at [facility] on 06/10/2019. Please excuse from work 06/09/19 to 06/14/19. She may return to work/school on 06/15/2019 with no restrictions. If you have any questions or concerns, or if I can be of further assistance, please do not hesitate to contact me.    Sincerely,    NAS Regan

## 2019-06-10 NOTE — PROGRESS NOTES
Subjective:       Patient ID: Thelma Roman is a 24 y.o. female.    Chief Complaint: Headache (pt states going on for a week now.)    23 y/o female presents to clinic for headache. Patient has history of migraine headaches and anxiety.    Headache    This is a new problem. The current episode started in the past 7 days. The pain is located in the occipital and retro-orbital region. The pain does not radiate. The pain quality is similar to prior headaches. The quality of the pain is described as throbbing and dull. The pain is at a severity of 6/10. Associated symptoms include photophobia. Pertinent negatives include no abdominal pain, abnormal behavior, blurred vision, coughing, dizziness, eye pain, eye redness, eye watering, fever, hearing loss, insomnia, loss of balance, muscle aches, nausea, neck pain, numbness, phonophobia, rhinorrhea, scalp tenderness, seizures, sinus pressure, sore throat, tingling, tinnitus, visual change, vomiting or weakness. The symptoms are aggravated by bright light. She has tried acetaminophen and NSAIDs for the symptoms. Her past medical history is significant for migraine headaches. There is no history of cancer, hypertension, obesity or recent head traumas.       Current Outpatient Medications   Medication Sig Dispense Refill    etonogestrel (NEXPLANON) 68 mg Impl by Subdermal route.      sertraline (ZOLOFT) 100 MG tablet       sumatriptan (IMITREX) 50 MG tablet Take 1 tablet (50 mg total) by mouth every 2 (two) hours as needed for Migraine. 15 tablet 0     Current Facility-Administered Medications   Medication Dose Route Frequency Provider Last Rate Last Dose    ketorolac injection 30 mg  30 mg Intravenous 1 time in Clinic/HOD NAS Regan           Past Medical History:   Diagnosis Date    Anxiety     Depression     Panic disorder without agoraphobia 9/16/2015       Past Surgical History:   Procedure Laterality Date    CARPAL TUNNEL RELEASE      FACIAL  RECONSTRUCTION SURGERY      RELEASE-CARPAL TUNNEL Right 10/23/2015    [facility]    TONSILLECTOMY         History reviewed. No pertinent family history.    Social History     Socioeconomic History    Marital status: Single     Spouse name: Not on file    Number of children: Not on file    Years of education: Not on file    Highest education level: Not on file   Occupational History    Not on file   Social Needs    Financial resource strain: Somewhat hard    Food insecurity:     Worry: Sometimes true     Inability: Sometimes true    Transportation needs:     Medical: No     Non-medical: No   Tobacco Use    Smoking status: Never Smoker    Smokeless tobacco: Never Used   Substance and Sexual Activity    Alcohol use: No     Frequency: Never    Drug use: No    Sexual activity: Yes   Lifestyle    Physical activity:     Days per week: 3 days     Minutes per session: 100 min    Stress: Rather much   Relationships    Social connections:     Talks on phone: More than three times a week     Gets together: Once a week     Attends Jewish service: Not on file     Active member of club or organization: No     Attends meetings of clubs or organizations: Not on file     Relationship status: Never    Other Topics Concern    Not on file   Social History Narrative    Lives with father. Astranged from her mother. Attends [employer], majoring in psychology with several minors. 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 [employer].        Review of Systems   Constitutional: Negative for activity change, fever and unexpected weight change.   HENT: Negative for hearing loss, rhinorrhea, sinus pressure, sore throat, tinnitus and trouble swallowing.    Eyes: Positive for photophobia. Negative for blurred vision, pain, discharge, redness and visual disturbance.   Respiratory: Negative for cough, chest tightness and wheezing.    Cardiovascular:  "Negative for chest pain and palpitations.   Gastrointestinal: Negative for abdominal pain, blood in stool, constipation, diarrhea, nausea and vomiting.   Endocrine: Negative for polydipsia and polyuria.   Genitourinary: Negative for difficulty urinating, dysuria, hematuria and menstrual problem.   Musculoskeletal: Negative for arthralgias, joint swelling and neck pain.   Neurological: Positive for headaches. Negative for dizziness, tingling, seizures, weakness, numbness and loss of balance.   Hematological: Negative for adenopathy. Does not bruise/bleed easily.   Psychiatric/Behavioral: Negative for confusion, dysphoric mood, self-injury and suicidal ideas. The patient does not have insomnia.          Objective:     Vitals:    06/10/19 1523   BP: 118/78   Pulse: 84   Temp: 98.2 °F (36.8 °C)   TempSrc: Oral   SpO2: 98%   Weight: 85.5 kg (188 lb 6.1 oz)   Height: 5' 9" (1.753 m)          Physical Exam   Constitutional: She is oriented to person, place, and time. She appears well-developed and well-nourished. No distress.   HENT:   Head: Normocephalic.   Right Ear: Tympanic membrane and ear canal normal.   Left Ear: Tympanic membrane and ear canal normal.   Nose: Nose normal.   Mouth/Throat: Uvula is midline and oropharynx is clear and moist.   Eyes: Pupils are equal, round, and reactive to light.   Neck: Normal range of motion. Neck supple.   Cardiovascular: Regular rhythm and intact distal pulses. Tachycardia present.   No murmur heard.  Pulmonary/Chest: Effort normal and breath sounds normal.   Musculoskeletal: Normal range of motion.   Lymphadenopathy:     She has no cervical adenopathy.   Neurological: She is alert and oriented to person, place, and time. She has normal strength. No cranial nerve deficit. Gait normal.   Skin: Skin is warm and dry.   Psychiatric: She has a normal mood and affect. Her behavior is normal. Thought content normal.         Assessment:         ICD-10-CM ICD-9-CM   1. Migraine without " aura and without status migrainosus, not intractable G43.009 346.10   2. Generalized anxiety disorder F41.1 300.02       Plan:       Migraine without aura and without status migrainosus, not intractable  · Avoid aged cheeses, nuts, beans, chocolate, red wine, or foods that contain caffeine, alcohol, tobacco, nitrates, and MSG.  · Drink lots of fluids.  · Try not to skip meals.  · Dont work in poor lighting.  · Reduce stress as much as you can.  · Get plenty of sleep each night.    -     ketorolac injection 30 mg: given in clinic  -     sumatriptan (IMITREX) 50 MG tablet; Take 1 tablet (50 mg total) by mouth every 2 (two) hours as needed for Migraine.  Dispense: 15 tablet; Refill: 0    Generalized anxiety disorder      -  Chronic, stable, continue current medication therapy      Follow up if symptoms worsen or fail to improve.     Patient's Medications   New Prescriptions    SUMATRIPTAN (IMITREX) 50 MG TABLET    Take 1 tablet (50 mg total) by mouth every 2 (two) hours as needed for Migraine.   Previous Medications    ETONOGESTREL (NEXPLANON) 68 MG IMPL    by Subdermal route.    SERTRALINE (ZOLOFT) 100 MG TABLET       Modified Medications    No medications on file   Discontinued Medications    No medications on file

## 2019-06-29 ENCOUNTER — OFFICE VISIT (OUTPATIENT)
Dept: URGENT CARE | Facility: CLINIC | Age: 24
End: 2019-06-29
Payer: COMMERCIAL

## 2019-06-29 ENCOUNTER — PATIENT MESSAGE (OUTPATIENT)
Dept: FAMILY MEDICINE | Facility: CLINIC | Age: 24
End: 2019-06-29

## 2019-06-29 VITALS
BODY MASS INDEX: 26.66 KG/M2 | RESPIRATION RATE: 20 BRPM | HEIGHT: 69 IN | OXYGEN SATURATION: 98 % | SYSTOLIC BLOOD PRESSURE: 122 MMHG | DIASTOLIC BLOOD PRESSURE: 84 MMHG | TEMPERATURE: 98 F | HEART RATE: 73 BPM | WEIGHT: 180 LBS

## 2019-06-29 DIAGNOSIS — A08.4 VIRAL GASTROENTERITIS: Primary | ICD-10-CM

## 2019-06-29 LAB
B-HCG UR QL: NEGATIVE
BILIRUB UR QL STRIP: POSITIVE
CTP QC/QA: YES
GLUCOSE UR QL STRIP: NEGATIVE
KETONES UR QL STRIP: NEGATIVE
LEUKOCYTE ESTERASE UR QL STRIP: POSITIVE
PH, POC UA: 5 (ref 5–8)
POC BLOOD, URINE: POSITIVE
POC NITRATES, URINE: NEGATIVE
PROT UR QL STRIP: POSITIVE
SP GR UR STRIP: 1.02 (ref 1–1.03)
UROBILINOGEN UR STRIP-ACNC: NORMAL (ref 0.1–1.1)

## 2019-06-29 PROCEDURE — 81003 URINALYSIS AUTO W/O SCOPE: CPT | Mod: QW,S$GLB,, | Performed by: FAMILY MEDICINE

## 2019-06-29 PROCEDURE — 99214 OFFICE O/P EST MOD 30 MIN: CPT | Mod: S$GLB,,, | Performed by: FAMILY MEDICINE

## 2019-06-29 PROCEDURE — 99214 PR OFFICE/OUTPT VISIT, EST, LEVL IV, 30-39 MIN: ICD-10-PCS | Mod: S$GLB,,, | Performed by: FAMILY MEDICINE

## 2019-06-29 PROCEDURE — 81003 POCT URINALYSIS, DIPSTICK, MANUAL, W/O SCOPE: ICD-10-PCS | Mod: QW,S$GLB,, | Performed by: FAMILY MEDICINE

## 2019-06-29 PROCEDURE — 81025 POCT URINE PREGNANCY: ICD-10-PCS | Mod: S$GLB,,, | Performed by: FAMILY MEDICINE

## 2019-06-29 PROCEDURE — 81025 URINE PREGNANCY TEST: CPT | Mod: S$GLB,,, | Performed by: FAMILY MEDICINE

## 2019-06-29 RX ORDER — ONDANSETRON 4 MG/1
4 TABLET, FILM COATED ORAL EVERY 8 HOURS PRN
Qty: 12 TABLET | Refills: 0 | Status: SHIPPED | OUTPATIENT
Start: 2019-06-29 | End: 2019-08-07

## 2019-06-29 NOTE — LETTER
June 29, 2019      [facility]  [address]  [facility] [address]  Phone: [phone]  Fax: [phone]       Patient: Thelma Roman   YOB: 1995  Date of Visit: 06/29/2019    To Whom It May Concern:    PING Roman  was at [facility] on 06/29/2019. She may return to work/school on 7/1/2019 with no restrictions. If you have any questions or concerns, or if I can be of further assistance, please do not hesitate to contact me.    Sincerely,      Mary Lou Smith, RT      Spoke to Dr. Ciara Ca in regards to this message and he states he would like to have the NP Mary Ann Hernandez call the patient and follow up with him.

## 2019-06-29 NOTE — PATIENT INSTRUCTIONS

## 2019-06-29 NOTE — PROGRESS NOTES
"Subjective:       Patient ID: Thelma Roman is a 24 y.o. female.    Vitals:  height is 5' 9" (1.753 m) and weight is 81.6 kg (180 lb). Her oral temperature is 98.1 °F (36.7 °C). Her blood pressure is 122/84 and her pulse is 73. Her respiration is 20 and oxygen saturation is 98%.     Chief Complaint: Abdominal Pain    This is a 24 y.o. female   with Past Medical History:  No date: Anxiety  No date: Depression  9/16/2015: Panic disorder without agoraphobia   and Past Surgical History:  No date: CARPAL TUNNEL RELEASE  No date: FACIAL RECONSTRUCTION SURGERY  10/23/2015: RELEASE-CARPAL TUNNEL; Right      Comment:  Performed by Rod Topete Jr., MD at [facility] OR  No date: TONSILLECTOMY  who presents today with a chief complaint of abdominal pain that began today. She's complaining of nausea, diarrhea, shortness of breath and a headache. She hasn't taken any medication to help relieve her symptoms. Denies urinary symptoms. No new or unusual ingesitons. 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 The current episode started today. The onset quality is sudden. The problem occurs constantly. The problem has been gradually worsening. The pain is located in the generalized abdominal region. The pain is at a severity of 7/10. The pain is severe. The quality of the pain is aching. The abdominal pain does not radiate. Associated symptoms include diarrhea, headaches and nausea. Pertinent negatives include no constipation, dysuria, fever or vomiting. Nothing aggravates the pain. The pain is relieved by nothing. She has tried nothing for the symptoms. There is no history of abdominal surgery.       Constitution: Negative for appetite change, chills, sweating and fever.   HENT: Negative for trouble swallowing.    Cardiovascular: Negative for chest pain.   Respiratory: Positive for shortness of breath.    Gastrointestinal: Positive for abdominal pain, nausea and diarrhea. Negative for abdominal " trauma, abdominal bloating, history of abdominal surgery, vomiting, constipation, dark colored stools and heartburn.   Genitourinary: Negative for dysuria, missed menses and pelvic pain.   Musculoskeletal: Negative for back pain.   Neurological: Positive for dizziness and headaches.       Objective:      Physical Exam   Constitutional: She appears well-developed and well-nourished.   HENT:   Head: Normocephalic and atraumatic.   Eyes: Pupils are equal, round, and reactive to light. EOM are normal.   Neck: Normal range of motion. Neck supple.   Cardiovascular: Normal rate and regular rhythm.   Pulmonary/Chest: Effort normal and breath sounds normal.   Abdominal: Soft.   Nursing note and vitals reviewed.      Assessment:       1. Viral gastroenteritis        Plan:         Viral gastroenteritis  -     POCT Urinalysis, Dipstick, Manual, W/O Scope  -     POCT urine pregnancy  -     ondansetron (ZOFRAN) 4 MG tablet; Take 1 tablet (4 mg total) by mouth every 8 (eight) hours as needed.  Dispense: 12 tablet; Refill: 0    BRAT diet and fluids. RTC as needed

## 2019-07-02 ENCOUNTER — OFFICE VISIT (OUTPATIENT)
Dept: URGENT CARE | Facility: CLINIC | Age: 24
End: 2019-07-02
Payer: COMMERCIAL

## 2019-07-02 VITALS
DIASTOLIC BLOOD PRESSURE: 82 MMHG | RESPIRATION RATE: 20 BRPM | HEIGHT: 69 IN | HEART RATE: 85 BPM | WEIGHT: 180 LBS | SYSTOLIC BLOOD PRESSURE: 125 MMHG | TEMPERATURE: 98 F | OXYGEN SATURATION: 98 % | BODY MASS INDEX: 26.66 KG/M2

## 2019-07-02 DIAGNOSIS — R10.9 ABDOMINAL PAIN, UNSPECIFIED ABDOMINAL LOCATION: ICD-10-CM

## 2019-07-02 DIAGNOSIS — R19.7 NAUSEA VOMITING AND DIARRHEA: Primary | ICD-10-CM

## 2019-07-02 DIAGNOSIS — R11.2 NAUSEA VOMITING AND DIARRHEA: Primary | ICD-10-CM

## 2019-07-02 LAB
B-HCG UR QL: NEGATIVE
BILIRUB UR QL STRIP: NEGATIVE
CTP QC/QA: YES
GLUCOSE UR QL STRIP: NEGATIVE
KETONES UR QL STRIP: NEGATIVE
LEUKOCYTE ESTERASE UR QL STRIP: NEGATIVE
PH, POC UA: 5.5 (ref 5–8)
POC BLOOD, URINE: NEGATIVE
POC NITRATES, URINE: NEGATIVE
PROT UR QL STRIP: NEGATIVE
SP GR UR STRIP: 1.02 (ref 1–1.03)
UROBILINOGEN UR STRIP-ACNC: NORMAL (ref 0.1–1.1)

## 2019-07-02 PROCEDURE — 81025 POCT URINE PREGNANCY: ICD-10-PCS | Mod: S$GLB,,, | Performed by: PHYSICIAN ASSISTANT

## 2019-07-02 PROCEDURE — 81025 URINE PREGNANCY TEST: CPT | Mod: S$GLB,,, | Performed by: PHYSICIAN ASSISTANT

## 2019-07-02 PROCEDURE — 99214 OFFICE O/P EST MOD 30 MIN: CPT | Mod: S$GLB,,, | Performed by: PHYSICIAN ASSISTANT

## 2019-07-02 PROCEDURE — 99214 PR OFFICE/OUTPT VISIT, EST, LEVL IV, 30-39 MIN: ICD-10-PCS | Mod: S$GLB,,, | Performed by: PHYSICIAN ASSISTANT

## 2019-07-02 PROCEDURE — 81003 URINALYSIS AUTO W/O SCOPE: CPT | Mod: QW,S$GLB,, | Performed by: PHYSICIAN ASSISTANT

## 2019-07-02 PROCEDURE — 81003 POCT URINALYSIS, DIPSTICK, AUTOMATED, W/O SCOPE: ICD-10-PCS | Mod: QW,S$GLB,, | Performed by: PHYSICIAN ASSISTANT

## 2019-07-03 NOTE — PATIENT INSTRUCTIONS
"Take zofran as needed for nausea.  Rest and stay hydrated.  Take tylenol/motrin as needed for pain/fever.  Eat a bland diet for the next few days while your stomach recovers.    Please follow up with your primary care provider within 2-5 days if your signs and symptoms have not resolved or worsen.     If your condition worsens or fails to improve we recommend that you receive another evaluation at the emergency room immediately or contact your primary medical clinic to discuss your concerns.   You must understand that you have received an Urgent Care treatment only and that you may be released before all of your medical problems are known or treated. You, the patient, will arrange for follow up care as instructed.         [place] Diet  Your healthcare provider may recommend a bland diet if you have an upset stomach. It consists of foods that are mild and easy to digest. It is better to eat small frequent meals rather than 3 large meals a day.    Beverages  OK: Fruit juices, non-caffeinated teas and coffee, non-carbonated haywood  Avoid: Carbonated beverage, caffeinated tea and coffee, all alcoholic beverages  Bread  OK: Refined white, wheat or rye bread, fernie or soda crackers, [place] toast, plain rolls, bagels  Avoid: Whole-grain bread  Cereal  OK: Refined cereals: cooked or ready to eat  Avoid: Whole-grain cereals and granola, or those containing bran, seeds or nuts  Desserts  OK: Peanut butter and all others except those to "avoid"  Avoid: Chocolate, cocoa, coconut, popcorn, nuts, seeds, jam, marmalade  Fruits  OK: Canned, cooked, frozen or fresh fruits without seeds or tough skin  Avoid: Olives, skin and seeds of fruit  Meats  OK: All fresh or preserved meat, fish and fowl  Avoid: Any that are prepared with those spices to "avoid"  Cheese and eggs  OK: Eggs, cottage cheese, cream cheese, other cheeses  Avoid: All cheeses made with those spices to "avoid"  Potatoes and pasta  OK: Potato, rice, macaroni, noodles, " "spaghetti  Avoid: None  Soups  OK: All soups without heavy seasoning  Avoid: Soups made with those spices to "avoid"  Vegetables  OK: Canned, cooked, fresh or frozen mildly flavored vegetables without seeds, skins or coarse fiber  Avoid: Vegetables prepared with those spices to "avoid"; skin and seeds of vegetables and those with coarse fiber  Spices  OK: Salt, lemon and lime juice, vinegar, all extracts, sunil, cinnamon, thyme, mace, allspice, paprika  Avoid: Chili powder, cloves, pepper, seed spices, garlic, gravy pickles, highly seasoned salad dressings  Date Last Reviewed: 11/20/2015  © 7257-9028 [employer]. [address] [place] [address], [place] [address]. All rights reserved. This information is not intended as a substitute for professional medical care. Always follow your healthcare professional's instructions.      "

## 2019-07-03 NOTE — PROGRESS NOTES
"Subjective:       Patient ID: Thelma Roman is a 24 y.o. female.    Vitals:  height is 5' 9" (1.753 m) and weight is 81.6 kg (180 lb). Her oral temperature is 97.9 °F (36.6 °C). Her blood pressure is 125/82 and her pulse is 85. Her respiration is 20 and oxygen saturation is 98%.     Chief Complaint: Nausea    This is a 24 y.o. female   who presents today with a chief complaint of nausea she's had for the past three days. She's complaining of nausea, abdominal pain and a headache. She was seen to days ago for the same symptoms but says her symptoms haven't improved. She was prescribed Zofran but says she couldn't afford the medication. She threw up once today. She hasn't taken any medication to help relieve her symptoms.     Nausea   This is a recurrent problem. The current episode started in the past 7 days. The problem occurs constantly. The problem has been gradually worsening. Associated symptoms include abdominal pain, headaches, nausea and vomiting. Pertinent negatives include no chest pain, chills, diaphoresis or fever. Nothing aggravates the symptoms. She has tried nothing for the symptoms.       Constitution: Negative for appetite change, chills, sweating and fever.   HENT: Negative for trouble swallowing.    Cardiovascular: Negative for chest pain.   Respiratory: Negative for shortness of breath.    Gastrointestinal: Positive for abdominal pain, nausea, vomiting and diarrhea. Negative for abdominal trauma, abdominal bloating, history of abdominal surgery, constipation, bright red blood in stool, dark colored stools, rectal bleeding, rectal pain, heartburn and bowel incontinence.   Genitourinary: Negative for dysuria, missed menses and pelvic pain.   Musculoskeletal: Negative for back pain.   Skin: Negative for erythema.   Neurological: Positive for headaches.       Objective:      Physical Exam   Constitutional: She is oriented to person, place, and time. Vital signs are normal. She appears well-developed " and well-nourished. She does not appear ill. No distress.   HENT:   Head: Normocephalic and atraumatic.   Right Ear: Hearing, tympanic membrane, external ear and ear canal normal.   Left Ear: Hearing, tympanic membrane, external ear and ear canal normal.   Nose: Nose normal. No mucosal edema. Right sinus exhibits no maxillary sinus tenderness and no frontal sinus tenderness. Left sinus exhibits no maxillary sinus tenderness and no frontal sinus tenderness.   Mouth/Throat: Uvula is midline and oropharynx is clear and moist. No oropharyngeal exudate, posterior oropharyngeal edema or posterior oropharyngeal erythema.   Eyes: Conjunctivae, EOM and lids are normal. Right eye exhibits no discharge. Left eye exhibits no discharge.   Neck: Normal range of motion. Neck supple.   Cardiovascular: Normal rate, regular rhythm and normal heart sounds. Exam reveals no gallop and no friction rub.   No murmur heard.  Pulmonary/Chest: Effort normal and breath sounds normal. No stridor. No respiratory distress. She has no decreased breath sounds. She has no wheezes. She has no rhonchi. She has no rales.   Abdominal: Normal appearance and bowel sounds are normal. There is generalized tenderness (mild). There is no rigidity, no rebound, no guarding, no CVA tenderness, no tenderness at McBurney's point and negative Torres's sign.   Musculoskeletal: Normal range of motion.   Lymphadenopathy:        Head (right side): No submandibular and no tonsillar adenopathy present.        Head (left side): No submandibular and no tonsillar adenopathy present.   Neurological: She is alert and oriented to person, place, and time.   Skin: Skin is warm and dry. No rash noted. She is not diaphoretic. No erythema. No pallor.   Psychiatric: She has a normal mood and affect. Her behavior is normal.   Nursing note and vitals reviewed.      Assessment:       1. Nausea vomiting and diarrhea    2. Abdominal pain, unspecified abdominal location        Office  Visit on 07/02/2019   Component Date Value Ref Range Status    POC Blood, Urine 07/02/2019 Negative  Negative Final    POC Bilirubin, Urine 07/02/2019 Negative  Negative Final    POC Urobilinogen, Urine 07/02/2019 normal  0.1 - 1.1 Final    POC Ketones, Urine 07/02/2019 Negative  Negative Final    POC Protein, Urine 07/02/2019 Negative  Negative Final    POC Nitrates, Urine 07/02/2019 Negative  Negative Final    POC Glucose, Urine 07/02/2019 Negative  Negative Final    pH, UA 07/02/2019 5.5  5 - 8 Final    POC Specific Gravity, Urine 07/02/2019 1.025  1.003 - 1.029 Final    POC Leukocytes, Urine 07/02/2019 Negative  Negative Final    POC Preg Test, Ur 07/02/2019 Negative  Negative Final     Acceptable 07/02/2019 Yes   Final     Plan:       Printed a coupon for patient to afford zofran. ER precautions discussed concerning abdominal pain. Advised patient to report to ED if she has RLQ pain.    Nausea vomiting and diarrhea    Abdominal pain, unspecified abdominal location  -     POCT Urinalysis, Dipstick, Automated, W/O Scope  -     POCT urine pregnancy      Patient Instructions   Take zofran as needed for nausea.  Rest and stay hydrated.  Take tylenol/motrin as needed for pain/fever.  Eat a bland diet for the next few days while your stomach recovers.    Please follow up with your primary care provider within 2-5 days if your signs and symptoms have not resolved or worsen.     If your condition worsens or fails to improve we recommend that you receive another evaluation at the emergency room immediately or contact your primary medical clinic to discuss your concerns.   You must understand that you have received an Urgent Care treatment only and that you may be released before all of your medical problems are known or treated. You, the patient, will arrange for follow up care as instructed.         [place] Diet  Your healthcare provider may recommend a bland diet if you have an upset stomach.  "It consists of foods that are mild and easy to digest. It is better to eat small frequent meals rather than 3 large meals a day.    Beverages  OK: Fruit juices, non-caffeinated teas and coffee, non-carbonated [place]wood  Avoid: Carbonated beverage, caffeinated tea and coffee, all alcoholic beverages  Bread  OK: Refined white, wheat or rye bread, fernie or soda crackers, [place] toast, plain rolls, bagels  Avoid: Whole-grain bread  Cereal  OK: Refined cereals: cooked or ready to eat  Avoid: Whole-grain cereals and granola, or those containing bran, seeds or nuts  Desserts  OK: Peanut butter and all others except those to "avoid"  Avoid: Chocolate, cocoa, coconut, popcorn, nuts, seeds, jam, marmalade  Fruits  OK: Canned, cooked, frozen or fresh fruits without seeds or tough skin  Avoid: Olives, skin and seeds of fruit  Meats  OK: All fresh or preserved meat, fish and fowl  Avoid: Any that are prepared with those spices to "avoid"  Cheese and eggs  OK: Eggs, cottage cheese, cream cheese, other cheeses  Avoid: All cheeses made with those spices to "avoid"  Potatoes and pasta  OK: Potato, rice, macaroni, noodles, spaghetti  Avoid: None  Soups  OK: All soups without heavy seasoning  Avoid: Soups made with those spices to "avoid"  Vegetables  OK: Canned, cooked, fresh or frozen mildly flavored vegetables without seeds, skins or coarse fiber  Avoid: Vegetables prepared with those spices to "avoid"; skin and seeds of vegetables and those with coarse fiber  Spices  OK: Salt, lemon and lime juice, vinegar, all extracts, sunil, cinnamon, thyme, mace, allspice, paprika  Avoid: Chili powder, cloves, pepper, seed spices, garlic, gravy pickles, highly seasoned salad dressings  Date Last Reviewed: 11/20/2015  © 2564-8990 [employer]. [address] [place] [address]. All rights reserved. This information is not intended as a substitute for professional medical care. Always follow your healthcare professional's " instructions.

## 2019-07-05 ENCOUNTER — TELEPHONE (OUTPATIENT)
Dept: URGENT CARE | Facility: CLINIC | Age: 24
End: 2019-07-05

## 2019-07-22 ENCOUNTER — PATIENT MESSAGE (OUTPATIENT)
Dept: FAMILY MEDICINE | Facility: CLINIC | Age: 24
End: 2019-07-22

## 2019-07-30 ENCOUNTER — OFFICE VISIT (OUTPATIENT)
Dept: URGENT CARE | Facility: CLINIC | Age: 24
End: 2019-07-30
Payer: COMMERCIAL

## 2019-07-30 VITALS
HEIGHT: 69 IN | RESPIRATION RATE: 18 BRPM | WEIGHT: 180 LBS | SYSTOLIC BLOOD PRESSURE: 131 MMHG | DIASTOLIC BLOOD PRESSURE: 80 MMHG | OXYGEN SATURATION: 98 % | HEART RATE: 90 BPM | TEMPERATURE: 98 F | BODY MASS INDEX: 26.66 KG/M2

## 2019-07-30 DIAGNOSIS — B30.9 ACUTE VIRAL CONJUNCTIVITIS, UNSPECIFIED LATERALITY: ICD-10-CM

## 2019-07-30 DIAGNOSIS — J02.9 ACUTE PHARYNGITIS, UNSPECIFIED ETIOLOGY: ICD-10-CM

## 2019-07-30 DIAGNOSIS — J02.9 SORE THROAT: Primary | ICD-10-CM

## 2019-07-30 DIAGNOSIS — R53.83 FATIGUE, UNSPECIFIED TYPE: ICD-10-CM

## 2019-07-30 LAB
CTP QC/QA: YES
CTP QC/QA: YES
HETEROPH AB SER QL: POSITIVE
S PYO RRNA THROAT QL PROBE: NEGATIVE

## 2019-07-30 PROCEDURE — 96372 THER/PROPH/DIAG INJ SC/IM: CPT | Mod: S$GLB,,, | Performed by: FAMILY MEDICINE

## 2019-07-30 PROCEDURE — 99214 PR OFFICE/OUTPT VISIT, EST, LEVL IV, 30-39 MIN: ICD-10-PCS | Mod: 25,S$GLB,, | Performed by: FAMILY MEDICINE

## 2019-07-30 PROCEDURE — 86308 POCT INFECTIOUS MONONUCLEOSIS: ICD-10-PCS | Mod: QW,S$GLB,, | Performed by: FAMILY MEDICINE

## 2019-07-30 PROCEDURE — 87880 POCT RAPID STREP A: ICD-10-PCS | Mod: QW,S$GLB,, | Performed by: FAMILY MEDICINE

## 2019-07-30 PROCEDURE — 87880 STREP A ASSAY W/OPTIC: CPT | Mod: QW,S$GLB,, | Performed by: FAMILY MEDICINE

## 2019-07-30 PROCEDURE — 99214 OFFICE O/P EST MOD 30 MIN: CPT | Mod: 25,S$GLB,, | Performed by: FAMILY MEDICINE

## 2019-07-30 PROCEDURE — 86308 HETEROPHILE ANTIBODY SCREEN: CPT | Mod: QW,S$GLB,, | Performed by: FAMILY MEDICINE

## 2019-07-30 PROCEDURE — 96372 PR INJECTION,THERAP/PROPH/DIAG2ST, IM OR SUBCUT: ICD-10-PCS | Mod: S$GLB,,, | Performed by: FAMILY MEDICINE

## 2019-07-30 RX ORDER — BETAMETHASONE SODIUM PHOSPHATE AND BETAMETHASONE ACETATE 3; 3 MG/ML; MG/ML
6 INJECTION, SUSPENSION INTRA-ARTICULAR; INTRALESIONAL; INTRAMUSCULAR; SOFT TISSUE
Status: COMPLETED | OUTPATIENT
Start: 2019-07-30 | End: 2019-07-30

## 2019-07-30 RX ORDER — LORATADINE 10 MG/1
10 TABLET ORAL DAILY
Qty: 30 TABLET | Refills: 2 | Status: SHIPPED | OUTPATIENT
Start: 2019-07-30 | End: 2019-08-07

## 2019-07-30 RX ADMIN — BETAMETHASONE SODIUM PHOSPHATE AND BETAMETHASONE ACETATE 6 MG: 3; 3 INJECTION, SUSPENSION INTRA-ARTICULAR; INTRALESIONAL; INTRAMUSCULAR; SOFT TISSUE at 04:07

## 2019-07-30 NOTE — LETTER
July 30, 2019      [facility]  [place]  [place] [address]  Phone: [phone]  Fax: [phone]       Patient: Thelma Roman   YOB: 1995  Date of Visit: 07/30/2019    To Whom It May Concern:    PING Roman  was at [facility] on 07/30/2019. She may return to work/school on 7/31/19  with no restrictions. If you have any questions or concerns, or if I can be of further assistance, please do not hesitate to contact me.    Sincerely,    Soo Douglas MD

## 2019-07-30 NOTE — PROGRESS NOTES
"Subjective:       Patient ID: Thelma Roman is a 24 y.o. female.    Vitals:  height is 5' 9" (1.753 m) and weight is 81.6 kg (180 lb). Her temperature is 98.4 °F (36.9 °C). Her blood pressure is 131/80 and her pulse is 90. Her respiration is 18 and oxygen saturation is 98%.     Chief Complaint: Sore Throat    C/o sore throat x one week, no fever, now hurts to swallow, also c/o left sided eye lid swelling also feels posterior neck pain and fatigue feeling, no drainage from eyes, no sick contact      Sore Throat    This is a new problem. The current episode started in the past 7 days (1 week ). The problem has been gradually worsening. The pain is worse on the left side. There has been no fever. The pain is at a severity of 5/10. The pain is moderate. Associated symptoms include coughing, headaches and trouble swallowing. Pertinent negatives include no abdominal pain, congestion, diarrhea, drooling, ear discharge, ear pain, hoarse voice, plugged ear sensation, neck pain, shortness of breath, stridor, swollen glands or vomiting. She has had no exposure to strep or mono. She has tried nothing for the symptoms.       Constitution: Negative for chills, sweating, fatigue and fever.   HENT: Positive for sore throat and trouble swallowing. Negative for ear pain, ear discharge, drooling, congestion, sinus pain, sinus pressure and voice change.    Neck: Positive for neck stiffness and painful lymph nodes. Negative for neck pain.   Eyes: Positive for eyelid swelling. Negative for eye redness.   Respiratory: Positive for cough. Negative for chest tightness, sputum production, bloody sputum, COPD, shortness of breath, stridor, wheezing and asthma.    Gastrointestinal: Negative for abdominal pain, nausea, vomiting and diarrhea.   Musculoskeletal: Negative for muscle ache.   Skin: Negative for rash.   Allergic/Immunologic: Negative for seasonal allergies and asthma.   Neurological: Positive for headaches.   Hematologic/Lymphatic: " Positive for swollen lymph nodes.       Objective:      Physical Exam   Constitutional: She is oriented to person, place, and time. She appears well-developed and well-nourished. She is cooperative.  Non-toxic appearance. She does not appear ill. No distress.   HENT:   Head: Normocephalic and atraumatic.   Right Ear: Hearing, tympanic membrane, external ear and ear canal normal.   Left Ear: Hearing, tympanic membrane, external ear and ear canal normal.   Nose: Nose normal. No mucosal edema, rhinorrhea or nasal deformity. No epistaxis. Right sinus exhibits no maxillary sinus tenderness and no frontal sinus tenderness. Left sinus exhibits no maxillary sinus tenderness and no frontal sinus tenderness.   Mouth/Throat: Uvula is midline, oropharynx is clear and moist and mucous membranes are normal. No trismus in the jaw. Normal dentition. No uvula swelling. No posterior oropharyngeal erythema.   Mild tender posterior left sided LAP  No neck rigidity  Throat clear, tms normal   Eyes: Conjunctivae and lids are normal. No scleral icterus.   Left conjunctivae slight erythema, no swelling noted     Neck: Trachea normal, full passive range of motion without pain and phonation normal. Neck supple.   Mild left posterior cervical LAP   Cardiovascular: Normal rate, regular rhythm, normal heart sounds, intact distal pulses and normal pulses.   No murmur heard.  Pulmonary/Chest: Effort normal and breath sounds normal. No stridor. No respiratory distress. She exhibits no tenderness.   Abdominal: Soft. Normal appearance and bowel sounds are normal. She exhibits no distension. There is no tenderness.   Musculoskeletal: Normal range of motion. She exhibits no edema or deformity.   Lymphadenopathy:     She has no cervical adenopathy.   Neurological: She is alert and oriented to person, place, and time. She exhibits normal muscle tone. Coordination normal.   Skin: Skin is warm, dry and intact. She is not diaphoretic. No pallor.    Psychiatric: She has a normal mood and affect. Her speech is normal and behavior is normal. Judgment and thought content normal. Cognition and memory are normal.   Nursing note and vitals reviewed.      Assessment:       1. Sore throat    2. Acute pharyngitis, unspecified etiology    3. Fatigue, unspecified type        Plan:         Sore throat  -     POCT rapid strep A  -     POCT Infectious mononucleosis antibody  -     loratadine (CLARITIN) 10 mg tablet; Take 1 tablet (10 mg total) by mouth once daily.  Dispense: 30 tablet; Refill: 2    Acute pharyngitis, unspecified etiology  -     loratadine (CLARITIN) 10 mg tablet; Take 1 tablet (10 mg total) by mouth once daily.  Dispense: 30 tablet; Refill: 2    Fatigue, unspecified type  -     betamethasone acetate-betamethasone sodium phosphate injection 6 mg        Pt advised to follow up with PCP if neck gland swelling persists

## 2019-07-30 NOTE — PATIENT INSTRUCTIONS
Please drink plenty of fluids.  Please get plenty of rest.  Please return here or go to the Emergency Department for any concerns or worsening of condition.  If you were prescribed a narcotic medication, do not drive or operate heavy equipment or machinery while taking these medications.  Please take over the counter Pepcid or Zantac as directed for the next 24-72hours as needed.  If you were given a steroid shot in the clinic and have also been given a prescription for a steroid such as Prednisone or a Medrol Dose Pack, please begin taking them tomorrow.  If you have a localized reaction it is ok to apply topical Benadryl and/or Cortaid as directed to the affected area.  Please take over the counter Benadryl as directed for the next 24-72 hours as needed for itching unless it makes you sleepy, then you can take over the counter Allegra or Claritin or Zyrtec as directed during the daytime hours as these generally do not cause drowsiness.  Please follow up with your primary care doctor or specialist as needed.    If you  smoke, please stop smoking.    Mononucleosis (Mono)  Mononucleosis, also known as mono, is caused by the Rolan-Barr virus. Mono is best known for causing swollen glands and tiredness, but it can cause other symptoms as well. Most children with mono recover without any problems. But the illness can take a long time to go away. In some cases, mono can cause problems with the liver, spleen, or heart. So it is important to diagnose mono and to watch your child carefully.  How mono is spread  Mono can be easily transmitted from an infected person's saliva by:  · Drinking and eating after them  · Sharing a straw, cup, toothbrushes, and eating utensils  · Kissing and close contact  · Handling toys with children drool  Symptoms of mono     The best treatment for mono is plenty of rest.   In children, common symptoms include:  · Tiredness, weakness  · Fever  · Sore throat  · Tender or swollen lymph nodes in  the neck or armpits  · Swollen tonsils  · Rash  · Sore muscles or stiffness  · Headache  · Loss of appetite, nausea  · Dull pain in the stomach area  · Enlarged liver and spleen  · Headaches  · Puffy eyes  · Sensitivity to light  Treating mono  Because it is a viral infection, antibiotics wont cure mono. Your child's healthcare provider may prescribe medicines to help ease your child's pain or discomfort. The best treatment for mono is rest. A child with mono should also drink lots of fluids. To help your child feel better and recover sooner:  · Make sure your child gets enough rest.  · Provide plenty of fluids, such as water or apple juice.  · The spleen may become enlarged with mono. Your child may need to avoid contact sports, and heavy lifting for a while in order to prevent injury to the spleen. Discuss this with your child's healthcare provider.  · Treat fever, sore throat, headache, or aching muscles with acetaminophen or ibuprofen. Never give your child aspirin. Your child's healthcare provider or nurse can help you with the correct dose.  Symptoms usually last for a few weeks, but can sometimes last for 1 to 2 months or longer. Even after symptoms go away, your child may be tired or weak for some time.  Preventing the spread of mono  While youre caring for a child with mono:  · Wash your hands with warm water and soap often, especially before and after tending to your sick child.  · Monitor your own health and that of other family members.  · Limit a sick childs contact with other children.  · Clean dishes and eating utensils used by a sick child separately in very hot, soapy water. Or run them through the .  When to seek medical care  Call your childs healthcare provider right away if your otherwise healthy child:  · Is younger than 3 months and has a fever of 100.4°F (38°C) or higher  · Is younger than 2 years old and has a fever that lasts more than 24 hours  · Is 2 years old or older and  the fever continues for 3 days  · Has repeated fevers above 104°F (40°C) at any age  · Has had a seizure caused by the fever  · Experiences difficult or rapid breathing  · Cant be soothed or shows signs of irritability or restlessness  · Seems unusually drowsy, listless, or unresponsive  · Has trouble eating, drinking, or swallowing  · Stops breathing, even for an instant  · Shows signs of severe chest, neck, or belly pain  Date Last Reviewed: 12/1/2016 © 2000-2017 [id number]. [address] [place] [address], [place] [address]. All rights reserved. This information is not intended as a substitute for professional medical care. Always follow your healthcare professional's instructions.

## 2019-08-05 NOTE — PROGRESS NOTES
FAMILY MEDICINE    Patient Active Problem List   Diagnosis    Chronic pain of right knee    Moderate episode of recurrent major depressive disorder    Generalized anxiety disorder    Patellofemoral dysfunction of right knee    Pharyngitis due to infectious mononucleosis       CC:   Chief Complaint   Patient presents with    Headache     all this has been going on July 30 and has gotten worse pt went to urgent care and the said she has mono     Sore Throat    Nasal Congestion    Cough    Fever     yesterday 101.0    Nausea    Diarrhea       SUBJECTIVE:  Thelma Roman   is a 24 y.o. female  - with anxiety and depression presents for sore throat and feeling ill since diagnosed with positive monospot 7/30/19    1. Sore throat    Onset: about 1 week prior to Urgent Care visit 7/30/19  Location: throat pain   Duration: constant  Character: burning throbbing throat pain with difficulty swallowing and fevers  Aggravating factors: swallowing  Relieving factors: reports that celestone injection on 7/30/19 did help with pain for about 1-2 days   Timing of day: all day  Associated symptoms: fevers, fatigue, chills, rib pain, swollen glands  Negative symptoms: no difficulty breathing, chest pain, cough    Sore Throat    This is a new problem. The current episode started 1 to 4 weeks ago. The problem has been gradually worsening. The pain is worse on the left side. The maximum temperature recorded prior to her arrival was 100 - 100.9 F. The fever has been present for less than 1 day. The pain is at a severity of 8/10. The pain is moderate. Associated symptoms include congestion, ear pain, headaches, a hoarse voice, a plugged ear sensation, neck pain, swollen glands and trouble swallowing. Pertinent negatives include no abdominal pain, coughing, diarrhea, drooling, ear discharge, shortness of breath, stridor or vomiting. She has had exposure to mono. She has had no exposure to strep. She has tried NSAIDs, cool  liquids and gargles for the symptoms. The treatment provided no relief.       ROS: Review of Systems   Constitutional: Positive for activity change, appetite change, fatigue and fever.   HENT: Positive for congestion, ear pain, hoarse voice, sore throat and trouble swallowing. Negative for drooling, ear discharge, nosebleeds, sinus pressure, sinus pain and voice change.    Eyes: Negative for pain, discharge, redness, itching and visual disturbance.   Respiratory: Negative for cough, chest tightness, shortness of breath and stridor.    Cardiovascular: Negative for chest pain, palpitations and leg swelling.   Gastrointestinal: Negative for abdominal distention, abdominal pain, blood in stool, constipation, diarrhea, nausea and vomiting.   Endocrine: Negative for polydipsia, polyphagia and polyuria.   Genitourinary: Negative for decreased urine volume, difficulty urinating, frequency, hematuria and urgency.   Musculoskeletal: Positive for neck pain. Negative for arthralgias and myalgias.   Skin: Negative for rash.   Allergic/Immunologic: Negative.    Neurological: Positive for weakness and headaches. Negative for dizziness and light-headedness.   Psychiatric/Behavioral: Positive for sleep disturbance.       Past Medical History:   Diagnosis Date    Anxiety     Depression     Panic disorder without agoraphobia 9/16/2015       Past Surgical History:   Procedure Laterality Date    CARPAL TUNNEL RELEASE      FACIAL RECONSTRUCTION SURGERY      RELEASE-CARPAL TUNNEL Right 10/23/2015    Performed by Rod Topete Jr., MD at [facility] OR    TONSILLECTOMY         History reviewed. No pertinent family history.    Social History     Tobacco Use    Smoking status: Never Smoker    Smokeless tobacco: Never Used   Substance Use Topics    Alcohol use: No     Frequency: Never    Drug use: No       Social History     Social History Narrative    Lives with father. Astranged from her mother. Attends [employer], majoring in  "psychology with several minors. Works at a furniture sale store.Denies alcohol, smoking or illicit drugs. [facility].        ALLERGIES: Review of patient's allergies indicates:  No Known Allergies    MEDS:   Current Outpatient Medications:     etonogestrel (NEXPLANON) 68 mg Impl subdermal device, by Subdermal route. , Disp: , Rfl:     sertraline (ZOLOFT) 100 MG tablet, , Disp: , Rfl:     cefdinir (OMNICEF) 300 MG capsule, Take 1 capsule (300 mg total) by mouth 2 (two) times daily. for 10 days, Disp: 20 capsule, Rfl: 0    methylPREDNISolone (MEDROL DOSEPACK) 4 mg tablet, use as directed, Disp: 1 Package, Rfl: 0    OBJECTIVE:   Vitals:    08/07/19 1337   BP: 120/78   BP Location: Left arm   Patient Position: Sitting   BP Method: X-Large (Manual)   Pulse: 110   Resp: 18   Temp: 98.4 °F (36.9 °C)   TempSrc: Oral   SpO2: 98%   Weight: 88.3 kg (194 lb 9.6 oz)   Height: 5' 9" (1.753 m)     Body mass index is 28.74 kg/m².    Physical Exam   Constitutional: No distress.   HENT:   Head: Normocephalic and atraumatic.   Right Ear: Tympanic membrane and ear canal normal.   Left Ear: Ear canal normal. Tympanic membrane is erythematous.   Nose: Nose normal.   Mouth/Throat: Uvula is midline. Mucous membranes are dry. Posterior oropharyngeal erythema present. Tonsils are 0 on the right. Tonsils are 0 on the left.   Eyes: Conjunctivae are normal. No scleral icterus.   Neck: Neck supple.   Cardiovascular: Normal rate, regular rhythm, normal heart sounds and intact distal pulses. Exam reveals no gallop and no friction rub.   No murmur heard.  Pulmonary/Chest: Effort normal and breath sounds normal. She has no decreased breath sounds. She has no wheezes. She has no rhonchi. She has no rales.   Musculoskeletal: She exhibits no edema.   Lymphadenopathy:     She has cervical adenopathy.   Neurological: She is alert.         PERTINENT RESULTS:    7/30/19   POCT Infectious mononucleosis antibody   Order: [id number]   Status:  Final result "   Visible to patient:  Yes (Patient Portal)   Next appt:  None   Dx:  Sore throat    Ref Range & Units 8d ago   Monospot Negative PositiveAbnormal      Acceptable  Yes            7/30/19  POCT rapid strep A   Order: [id number]   Status:  Final result   Visible to patient:  Yes (Patient Portal)   Next appt:  None   Dx:  Sore throat   (important suggestion)  Newer results are available. Click to view them now.    Ref Range & Units 8d ago   Rapid Strep A Screen Negative Negative     Acceptable  Yes            ASSESSMENT/PLAN:  Problem List Items Addressed This Visit        ENT    Pharyngitis due to infectious mononucleosis - Primary    Current Assessment & Plan     - monospot positive and rapid strept negative  - concern that no improvement in 1 week and continued fever  - check strept culture and empirically treat with Cefdinir 300 mg BID x 10 days pending culture resutls  - Medrol dose pack  - no work until recovered         Relevant Medications    methylPREDNISolone (MEDROL DOSEPACK) 4 mg tablet      Other Visit Diagnoses     Pharyngitis, unspecified etiology        Relevant Medications    cefdinir (OMNICEF) 300 MG capsule    Other Relevant Orders    Strep A culture, throat          ORDERS:   Orders Placed This Encounter    Strep A culture, throat    cefdinir (OMNICEF) 300 MG capsule    methylPREDNISolone (MEDROL DOSEPACK) 4 mg tablet     Follow-up in 1 weeks.     Dr. Amy Molina D.O.   [facility]

## 2019-08-07 ENCOUNTER — PATIENT MESSAGE (OUTPATIENT)
Dept: FAMILY MEDICINE | Facility: CLINIC | Age: 24
End: 2019-08-07

## 2019-08-07 ENCOUNTER — OFFICE VISIT (OUTPATIENT)
Dept: FAMILY MEDICINE | Facility: CLINIC | Age: 24
End: 2019-08-07
Payer: COMMERCIAL

## 2019-08-07 VITALS
RESPIRATION RATE: 18 BRPM | HEART RATE: 110 BPM | HEIGHT: 69 IN | BODY MASS INDEX: 28.83 KG/M2 | SYSTOLIC BLOOD PRESSURE: 120 MMHG | DIASTOLIC BLOOD PRESSURE: 78 MMHG | OXYGEN SATURATION: 98 % | TEMPERATURE: 98 F | WEIGHT: 194.63 LBS

## 2019-08-07 DIAGNOSIS — J02.9 PHARYNGITIS, UNSPECIFIED ETIOLOGY: ICD-10-CM

## 2019-08-07 DIAGNOSIS — B27.90 PHARYNGITIS DUE TO INFECTIOUS MONONUCLEOSIS: Primary | ICD-10-CM

## 2019-08-07 PROCEDURE — 99214 OFFICE O/P EST MOD 30 MIN: CPT | Mod: S$GLB,,, | Performed by: FAMILY MEDICINE

## 2019-08-07 PROCEDURE — 99214 PR OFFICE/OUTPT VISIT, EST, LEVL IV, 30-39 MIN: ICD-10-PCS | Mod: S$GLB,,, | Performed by: FAMILY MEDICINE

## 2019-08-07 PROCEDURE — 99999 PR PBB SHADOW E&M-EST. PATIENT-LVL IV: CPT | Mod: PBBFAC,,, | Performed by: FAMILY MEDICINE

## 2019-08-07 PROCEDURE — 99999 PR PBB SHADOW E&M-EST. PATIENT-LVL IV: ICD-10-PCS | Mod: PBBFAC,,, | Performed by: FAMILY MEDICINE

## 2019-08-07 PROCEDURE — 87081 CULTURE SCREEN ONLY: CPT

## 2019-08-07 RX ORDER — METHYLPREDNISOLONE 4 MG/1
TABLET ORAL
Qty: 1 PACKAGE | Refills: 0 | Status: SHIPPED | OUTPATIENT
Start: 2019-08-07 | End: 2019-08-14

## 2019-08-07 RX ORDER — CEFDINIR 300 MG/1
300 CAPSULE ORAL 2 TIMES DAILY
Qty: 20 CAPSULE | Refills: 0 | Status: SHIPPED | OUTPATIENT
Start: 2019-08-07 | End: 2019-08-14 | Stop reason: ALTCHOICE

## 2019-08-07 NOTE — LETTER
August 7, 2019      [id number]  [place] [address]  Phone: [phone]  Fax: [phone]       Patient: Thelma Roman   YOB: 1995  Date of Visit: 08/07/2019    To Whom It May Concern:    PING Roman  was at [facility] on 7/30/19 and  08/07/2019. She is acutely ill and unable to return to work. She has a follow-up with me in 1 week to reassess.      Please excuse from 7/30/19 until her follow-up with me on _____________    Sincerely,    Amy Molina, DO

## 2019-08-07 NOTE — ASSESSMENT & PLAN NOTE
- monospot positive and rapid strept negative  - concern that no improvement in 1 week and continued fever  - check strept culture and empirically treat with Cefdinir 300 mg BID x 10 days pending culture resutls  - Medrol dose pack  - no work until recovered

## 2019-08-10 LAB — BACTERIA THROAT CULT: NORMAL

## 2019-08-13 ENCOUNTER — PATIENT MESSAGE (OUTPATIENT)
Dept: FAMILY MEDICINE | Facility: CLINIC | Age: 24
End: 2019-08-13

## 2019-08-13 NOTE — PROGRESS NOTES
FAMILY MEDICINE    Patient Active Problem List   Diagnosis    Chronic pain of right knee    Moderate episode of recurrent major depressive disorder    Generalized anxiety disorder    Patellofemoral dysfunction of right knee    Pharyngitis due to infectious mononucleosis       CC:   Chief Complaint   Patient presents with    Follow-up       SUBJECTIVE:  Thelma Roman   is a 24 y.o. female  - with anxiety and depression presents for follow-up. She was diagnosed with positive monospot 7/30/19 and seen in clinic 8/7/19 due to continued pain and feeling unwell. She was treated with a Medrol dose pack and strept culture was collected. She was started on Cefdinir empirically and she was notified when culture was negative and told to stop Cefdinir which she has.    Today she reports that throat pain is tolerable but still very fatigue. No fevers since last visit. No chest pain, shortness of breath, dyspnea on exertion, nausea, vomiting, abdominal pain or headaches. She still does not feel well enough to return to work.       ROS: Review of Systems   Constitutional: Positive for activity change, appetite change and fatigue. Negative for fever and unexpected weight change.   HENT: Positive for sore throat. Negative for congestion, hearing loss, nosebleeds, rhinorrhea, sinus pressure, sinus pain and voice change.    Eyes: Negative for pain, discharge, redness, itching and visual disturbance.   Respiratory: Negative for cough, chest tightness, shortness of breath and wheezing.    Cardiovascular: Negative for chest pain and palpitations.   Gastrointestinal: Negative for abdominal distention, blood in stool, constipation, diarrhea, nausea and vomiting.   Genitourinary: Negative for decreased urine volume, difficulty urinating, dysuria, frequency, hematuria, menstrual problem and urgency.   Musculoskeletal: Negative for arthralgias, myalgias, neck pain and neck stiffness.   Skin: Negative for rash.   Neurological: Negative  "for dizziness, weakness, light-headedness and headaches.   Psychiatric/Behavioral: Negative for sleep disturbance.       Past Medical History:   Diagnosis Date    Anxiety     Depression     Panic disorder without agoraphobia 9/16/2015       Past Surgical History:   Procedure Laterality Date    CARPAL TUNNEL RELEASE      FACIAL RECONSTRUCTION SURGERY      RELEASE-CARPAL TUNNEL Right 10/23/2015    Performed by Rod [facility]ete Jr[facility] at [facility] OR    TONSILLECTOMY         History reviewed. No pertinent family history.    Social History     Tobacco Use    Smoking status: Never Smoker    Smokeless tobacco: Never Used   Substance Use Topics    Alcohol use: No     Frequency: Never    Drug use: No       Social History     Social History Narrative    Lives with father. Astranged from her mother. Attends [employer], majoring in psychology with several minors. Works at a furniture sale store.Denies alcohol, smoking or illicit drugs. [employer].        ALLERGIES: Review of patient's allergies indicates:  No Known Allergies    MEDS:   Current Outpatient Medications:     etonogestrel (NEXPLANON) 68 mg Impl subdermal device, by Subdermal route. , Disp: , Rfl:     sertraline (ZOLOFT) 100 MG tablet, , Disp: , Rfl:     OBJECTIVE:   Vitals:    08/14/19 1145   BP: 118/78   BP Location: Left arm   Patient Position: Sitting   BP Method: X-Large (Manual)   Pulse: 89   Resp: 18   Temp: 98.3 °F (36.8 °C)   TempSrc: Oral   SpO2: 98%   Weight: 88.9 kg (196 lb)   Height: 5' 9" (1.753 m)     Body mass index is 28.94 kg/m².    Physical Exam   Constitutional: No distress.   HENT:   Head: Atraumatic.   Mouth/Throat: Uvula is midline and mucous membranes are normal. Posterior oropharyngeal erythema present. Tonsils are 0 on the right. Tonsils are 0 on the left.   Eyes: Conjunctivae are normal. No scleral icterus.   Neck: Neck supple.   Cardiovascular: Normal rate, regular rhythm, normal heart sounds and intact distal pulses. Exam reveals no " gallop and no friction rub.   No murmur heard.  Pulmonary/Chest: Effort normal and breath sounds normal. She has no decreased breath sounds. She has no wheezes. She has no rhonchi. She has no rales.   Abdominal: Soft. Normal appearance. There is no hepatosplenomegaly. There is no tenderness.   Musculoskeletal: She exhibits no edema.   Lymphadenopathy:     She has cervical adenopathy.   Neurological: She is alert.         PERTINENT RESULTS:    7/30/19   POCT Infectious mononucleosis antibody   Order: [id number]   Status:  Final result   Visible to patient:  Yes (Patient Portal)   Next appt:  None   Dx:  Sore throat    Ref Range & Units 8d ago   Monospot Negative PositiveAbnormal      Acceptable  Yes            7/30/19  POCT rapid strep A   Order: [id number]   Status:  Final result   Visible to patient:  Yes (Patient Portal)   Next appt:  None   Dx:  Sore throat   (important suggestion)  Newer results are available. Click to view them now.    Ref Range & Units 8d ago   Rapid Strep A Screen Negative Negative     Acceptable  Yes            Strep A culture, throat   Order: [id number]   Status:  Final result   Visible to patient:  Yes (Patient Portal)   Next appt:  None   Dx:  Pharyngitis, unspecified etiology   Specimen Information: Throat        Component 7d ago   Strep A Culture No  Group A  Streptococcus isolated    Resulting Agency [facility]         Specimen Collected: 08/07/19 13:48   Last Resulted: 08/10/19 11:25             ASSESSMENT/PLAN:  Problem List Items Addressed This Visit        ENT    Pharyngitis due to infectious mononucleosis - Primary    Current Assessment & Plan     - improved from last visit   - supportive care  - counseling on avoiding contact sports or trauma               Follow-up as needed if no improvement    Dr. Amy Molina D.O.   Family Medicine

## 2019-08-14 ENCOUNTER — OFFICE VISIT (OUTPATIENT)
Dept: FAMILY MEDICINE | Facility: CLINIC | Age: 24
End: 2019-08-14
Payer: COMMERCIAL

## 2019-08-14 VITALS
RESPIRATION RATE: 18 BRPM | WEIGHT: 196 LBS | TEMPERATURE: 98 F | BODY MASS INDEX: 29.03 KG/M2 | DIASTOLIC BLOOD PRESSURE: 78 MMHG | HEART RATE: 89 BPM | OXYGEN SATURATION: 98 % | SYSTOLIC BLOOD PRESSURE: 118 MMHG | HEIGHT: 69 IN

## 2019-08-14 DIAGNOSIS — B27.90 PHARYNGITIS DUE TO INFECTIOUS MONONUCLEOSIS: Primary | ICD-10-CM

## 2019-08-14 PROCEDURE — 99213 PR OFFICE/OUTPT VISIT, EST, LEVL III, 20-29 MIN: ICD-10-PCS | Mod: S$GLB,,, | Performed by: FAMILY MEDICINE

## 2019-08-14 PROCEDURE — 99999 PR PBB SHADOW E&M-EST. PATIENT-LVL IV: CPT | Mod: PBBFAC,,, | Performed by: FAMILY MEDICINE

## 2019-08-14 PROCEDURE — 99213 OFFICE O/P EST LOW 20 MIN: CPT | Mod: S$GLB,,, | Performed by: FAMILY MEDICINE

## 2019-08-14 PROCEDURE — 99999 PR PBB SHADOW E&M-EST. PATIENT-LVL IV: ICD-10-PCS | Mod: PBBFAC,,, | Performed by: FAMILY MEDICINE

## 2019-08-14 NOTE — LETTER
August 14, 2019      [address]  [facility] [address]  Phone: [phone]  Fax: [phone]       Patient: Thelma Roman   YOB: 1995  Date of Visit: 08/14/2019    To Whom It May Concern:    PING Roman  was at [facility] on 08/14/2019.   She may return to work on 8/21/19 with no restrictions.     If you have any questions or concerns, or if I can be of further assistance, please do not hesitate to contact me.    Sincerely,    Amy Molina, DO

## 2019-09-01 ENCOUNTER — PATIENT MESSAGE (OUTPATIENT)
Dept: FAMILY MEDICINE | Facility: CLINIC | Age: 24
End: 2019-09-01

## 2019-09-02 ENCOUNTER — PATIENT MESSAGE (OUTPATIENT)
Dept: FAMILY MEDICINE | Facility: CLINIC | Age: 24
End: 2019-09-02

## 2019-09-18 NOTE — PROGRESS NOTES
"FAMILY MEDICINE    Patient Active Problem List   Diagnosis    Chronic pain of right knee    Moderate episode of recurrent major depressive disorder    Generalized anxiety disorder    Patellofemoral dysfunction of right knee       CC:   Chief Complaint   Patient presents with    Knee Pain     right knee pain    Discussion     need note for work       SUBJECTIVE:  Thelma Roman   is a 24 y.o. female  - with anxiety and depression present for acute on chronic right knee pain. 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 She reports that this has been worsening her right knee pain. She has been continuing the exercises that she learned in PT but has not complete a full PT treatment regimen       1. Right knee pain  - since 17 yo     Prior notes: Initially seen by me for this issues s/p ER visit 2/27/19 following a fall. LAST NOTE: "Pt was recently seen in the ER 2/27/19 for acute right knee pain that started s/p trip and fall at the bottom of a staircase (walking up stairs) about 45 mins prior to her ER visit. She was unable to bear weight s/p the incident with +abrasion was noted with normal ROM and no sensory issues. Pt was able able to bear weight and ambulate by the time she presented to the ER. Xrays were done of the knee and leg and no fracture or dislocation was noted.   Since ER visit, pt reports that knee has continued to improve. Still mild area of bruising but no swelling and ambulating well for work. 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 She missed work 2/27/19-3/3/19 and will need an excuse for the days that she missed. No other concerns with her knee"  Pt reports that knee pain has been chronic and was previously evaluated by Dr. Moise her prior PCP and was referred to Dr. Lio Mahmood [facility] " "and seen on 10/16/18. He evaluated the knee and discussed diagnosis of PFS and recommended icing, NSAIDs, and PT. She was referred to PT at [place]. He also prescribed a right knee brace with hinges for support to use when she is at work. She had an Xray done 10/19/18 s/p persistent pain     10/19/2018   XR KNEE ORTHO RIGHT WITH FLEXION  CLINICAL HISTORY:  Pain in unspecified knee  TECHNIQUE:  AP standing as well as PA flexion standing and Merchant views of both knees were performed.  A lateral view of the right knee is also performed.  FINDINGS:  There is no fracture, dislocation, or bony erosion.  The joint spaces appear satisfactorily preserved.  IMPRESSION  As above.    She also had her PT evaluation on 11/16/18 and reports that she was told everything was fine "It was just my ankle" and was not scheduled for follow-up so she only attended the one session. However I reviewed 11/16/18 noted by PT Ted Downey from [place] and recommendation was for: "Outpatient Physical Therapy 2 times weekly for 6 weeks to include the following interventions: Gait Training, Manual Therapy, Moist Heat/ Ice, Neuromuscular Re-ed, Patient Education, Therapeutic Activites and Therapeutic Exercise."  She continued to have pain and had her follow-up with Dr. Mahmood on 12/27/18 who encouraged her to return to PT, but it appears that she did not. She is no longer wearing her knee brace "I am kind of all or nothing" noting that she would either wear the knee brace all  the time or not all. And reports that when she was instructed not to overuse the knee brace since it can lead to muscle weakness and persistent pain issues.   When she saw me 3/19/18 she reports that pain had improved and was doing well prior to her fall. Since the fall pain has worsened and she reports that she has missed several classes at school "I just can't make it to school because of the pain." She had to decreased her class hours and is a jeopardy of losing her " "financial aid.    She had evaluation with PT on 4/22/19 with recommendations for PT 2 times weekly but has been unable to participate consistently due to work and school session. She has had only one session on 5/8/19. "     2. Depression and anxiety  - still has not established with psychiatry as recommended     Age diagnosed: 15 yo      Prior treatments: Prozac, Celexa, Lexapro  Side effects of prior treatment: Not effective     Current treatment: Zoloft 100 mg daily  Side effects of current treatment: denies     Symptoms related: reports that doing well and has recently been well controlled  Panic attacks: denies     Hopelessness: denies  Sleep: denies  Suicidal thoughts: denies  Thoughts of self harm:denies  Thoughts of harm to others: denies  History of suicide attempts: denies  Family history of suicide:denies     Support system: friends and family  Counselor: has had counseling in the past and looking to restart         ROS: Review of Systems   Constitutional: Negative.    HENT: Negative.    Eyes: Negative.    Respiratory: Negative.    Cardiovascular: Negative.    Gastrointestinal: Negative.    Endocrine: Negative.    Genitourinary: Negative.    Musculoskeletal: Positive for arthralgias and gait problem. Negative for back pain, joint swelling, myalgias, neck pain and neck stiffness.   Skin: Negative.    Allergic/Immunologic: Negative.    Hematological: Negative.    Psychiatric/Behavioral: Negative.        Past Medical History:   Diagnosis Date    Anxiety     Depression     Panic disorder without agoraphobia 9/16/2015       Past Surgical History:   Procedure Laterality Date    CARPAL TUNNEL RELEASE      FACIAL RECONSTRUCTION SURGERY      RELEASE-CARPAL TUNNEL Right 10/23/2015    Performed by [facility]ete Jr., [facility] at [facility]    TONSILLECTOMY         History reviewed. No pertinent family history.    Social History     Tobacco Use    Smoking status: Never Smoker    Smokeless tobacco: Never Used " "  Substance Use Topics    Alcohol use: No     Frequency: Never    Drug use: No       Social History     Social History Narrative    Lives with father. Astranged from her mother. Attends [employer], majoring in psychology with several minors. Works at a furniture sale store.Denies alcohol, smoking or illicit drugs. [employer].        ALLERGIES: Review of patient's allergies indicates:  No Known Allergies    MEDS:   Current Outpatient Medications:     etonogestrel (NEXPLANON) 68 mg Impl subdermal device, by Subdermal route. , Disp: , Rfl:     sertraline (ZOLOFT) 100 MG tablet, , Disp: , Rfl:     OBJECTIVE:   Vitals:    09/19/19 1349   BP: 110/62   BP Location: Right arm   Patient Position: Sitting   BP Method: X-Large (Manual)   Pulse: 86   Resp: 16   Temp: 98.2 °F (36.8 °C)   TempSrc: Oral   SpO2: 96%   Weight: 88.9 kg (195 lb 14.4 oz)   Height: 5' 9" (1.753 m)     Body mass index is 28.93 kg/m².    Physical Exam   Constitutional: No distress.   Neck: Neck supple.   Cardiovascular: Normal rate, regular rhythm, normal heart sounds and intact distal pulses. Exam reveals no gallop and no friction rub.   No murmur heard.  Pulmonary/Chest: Effort normal and breath sounds normal. She has no decreased breath sounds. She has no wheezes. She has no rhonchi. She has no rales.   Musculoskeletal: She exhibits no edema.        Right knee: She exhibits normal range of motion, no swelling, no effusion, no erythema, normal alignment, no LCL laxity, normal meniscus and no MCL laxity.   Neurological: She is alert.       Knee exam:   ROM:     Active extension to 0° bilaterally without hyperextension, lag, crepitus, or patellar J sign.    Active flexion to 135° bilaterally.      Strength: (bilaterally)  Knee Flexion - 5/5  Knee Extension - 5/5  Hip Flexion - 5/5  Hip Extension - 5/5  Ankle dorsiflexion - 5/5  Ankle Plantarflexion - 5/5     Patellofemoral Exam:  Patella position - Neutral   Patellar grind - negative  No patellar laxity " with medial and lateral translation   No apprehension with medial and lateral patellar translation.     Depression Patient Health Questionnaire 9/19/2019 8/14/2019 8/7/2019 5/16/2019 5/7/2019 4/4/2019 3/13/2019   Over the last two weeks how often have you been bothered by little interest or pleasure in doing things 0 0 0 2 0 1 2   Over the last two weeks how often have you been bothered by feeling down, depressed or hopeless 0 0 0 2 0 1 1   PHQ-2 Total Score 0 0 0 4 0 2 3   Over the last two weeks how often have you been bothered by trouble falling or staying asleep, or sleeping too much - - - 0 - - 3   Over the last two weeks how often have you been bothered by feeling tired or having little energy - - - 2 - - 3   Over the last two weeks how often have you been bothered by a poor appetite or overeating - - - 2 - - 0   Over the last two weeks how often have you been bothered by feeling bad about yourself - or that you are a failure or have let yourself or your family down - - - 0 - - 1   Over the last two weeks how often have you been bothered by trouble concentrating on things, such as reading the newspaper or watching television - - - 0 - - 0   Over the last two weeks how often have you been bothered by moving or speaking so slowly that other people could have noticed. Or the opposite - being so fidgety or restless that you have been moving around a lot more than usual. - - - 0 - - 0   Over the last two weeks how often have you been bothered by thoughts that you would be better off dead, or of hurting yourself - - - 0 - - 0   If you checked off any problems, how difficult have these problems made it for you to do your work, take care of things at home or get along with other people? - - - Somewhat difficult - - Very difficult   Total Score - - - 8 - - 10     ASSESSMENT/PLAN:  Problem List Items Addressed This Visit        Psychiatric    Moderate episode of recurrent major depressive disorder    Current Assessment  & Plan     - well controlled  - continue current medications         Generalized anxiety disorder    Current Assessment & Plan     - well controlled  - continue current medications            Orthopedic    Chronic pain of right knee - Primary    Current Assessment & Plan     - encourage PT and exercises  - letter completed to accomodate patient's needs at work         Patellofemoral dysfunction of right knee        Follow-up with 6 months or sooner if any issues    Dr. Amy Molina D.O.   Family Medicine

## 2019-09-19 ENCOUNTER — OFFICE VISIT (OUTPATIENT)
Dept: FAMILY MEDICINE | Facility: CLINIC | Age: 24
End: 2019-09-19
Payer: COMMERCIAL

## 2019-09-19 VITALS
RESPIRATION RATE: 16 BRPM | BODY MASS INDEX: 29.01 KG/M2 | SYSTOLIC BLOOD PRESSURE: 110 MMHG | OXYGEN SATURATION: 96 % | WEIGHT: 195.88 LBS | DIASTOLIC BLOOD PRESSURE: 62 MMHG | HEART RATE: 86 BPM | TEMPERATURE: 98 F | HEIGHT: 69 IN

## 2019-09-19 DIAGNOSIS — M25.561 CHRONIC PAIN OF RIGHT KNEE: Primary | ICD-10-CM

## 2019-09-19 DIAGNOSIS — F41.1 GENERALIZED ANXIETY DISORDER: ICD-10-CM

## 2019-09-19 DIAGNOSIS — M25.861 PATELLOFEMORAL DYSFUNCTION OF RIGHT KNEE: ICD-10-CM

## 2019-09-19 DIAGNOSIS — G89.29 CHRONIC PAIN OF RIGHT KNEE: Primary | ICD-10-CM

## 2019-09-19 DIAGNOSIS — F33.1 MODERATE EPISODE OF RECURRENT MAJOR DEPRESSIVE DISORDER: ICD-10-CM

## 2019-09-19 PROBLEM — B27.90 PHARYNGITIS DUE TO INFECTIOUS MONONUCLEOSIS: Status: RESOLVED | Noted: 2019-08-07 | Resolved: 2019-09-19

## 2019-09-19 PROCEDURE — 99999 PR PBB SHADOW E&M-EST. PATIENT-LVL IV: ICD-10-PCS | Mod: PBBFAC,,, | Performed by: FAMILY MEDICINE

## 2019-09-19 PROCEDURE — 99214 OFFICE O/P EST MOD 30 MIN: CPT | Mod: S$GLB,,, | Performed by: FAMILY MEDICINE

## 2019-09-19 PROCEDURE — 99999 PR PBB SHADOW E&M-EST. PATIENT-LVL IV: CPT | Mod: PBBFAC,,, | Performed by: FAMILY MEDICINE

## 2019-09-19 PROCEDURE — 99214 PR OFFICE/OUTPT VISIT, EST, LEVL IV, 30-39 MIN: ICD-10-PCS | Mod: S$GLB,,, | Performed by: FAMILY MEDICINE

## 2019-09-19 NOTE — LETTER
September 19, 2019      [place] [address], [place] [address]  [facility] [address]  Phone: [phone]  Fax: [phone]       Patient: Thelma Roman   YOB: 1995  Date of Visit: 09/19/2019    To Whom It May Concern:    PING Roman  was at [facility] on 09/19/2019. She is a patient of mine who suffer from chronic right knee pain that is exacerbated by working on her feet for a prolonged period. She requires to ability to sit and rest intermittently to prevent worsening pain, but otherwise has no other work restrictions.     Please provide appropriate accommodations for her.     Sincerely,    Amy Molina, DO

## 2019-09-20 ENCOUNTER — PATIENT MESSAGE (OUTPATIENT)
Dept: FAMILY MEDICINE | Facility: CLINIC | Age: 24
End: 2019-09-20

## 2019-09-20 DIAGNOSIS — M25.561 CHRONIC PAIN OF RIGHT KNEE: Primary | ICD-10-CM

## 2019-09-20 DIAGNOSIS — M25.861 PATELLOFEMORAL DYSFUNCTION OF RIGHT KNEE: ICD-10-CM

## 2019-09-20 DIAGNOSIS — G89.29 CHRONIC PAIN OF RIGHT KNEE: Primary | ICD-10-CM

## 2019-09-25 PROBLEM — M25.561 CHRONIC PAIN OF RIGHT KNEE: Status: RESOLVED | Noted: 2018-11-18 | Resolved: 2019-09-25

## 2019-09-25 PROBLEM — M25.861 PATELLOFEMORAL DYSFUNCTION OF RIGHT KNEE: Status: RESOLVED | Noted: 2019-04-04 | Resolved: 2019-09-25

## 2019-09-25 PROBLEM — G89.29 CHRONIC PAIN OF RIGHT KNEE: Status: RESOLVED | Noted: 2018-11-18 | Resolved: 2019-09-25

## 2019-10-09 ENCOUNTER — PATIENT MESSAGE (OUTPATIENT)
Dept: FAMILY MEDICINE | Facility: CLINIC | Age: 24
End: 2019-10-09

## 2019-10-09 ENCOUNTER — TELEPHONE (OUTPATIENT)
Dept: FAMILY MEDICINE | Facility: CLINIC | Age: 24
End: 2019-10-09

## 2019-10-17 RX ORDER — SERTRALINE HYDROCHLORIDE 100 MG/1
TABLET, FILM COATED ORAL
Qty: 30 TABLET | Refills: 3 | Status: SHIPPED | OUTPATIENT
Start: 2019-10-17 | End: 2020-03-04

## 2019-10-21 ENCOUNTER — OFFICE VISIT (OUTPATIENT)
Dept: FAMILY MEDICINE | Facility: CLINIC | Age: 24
End: 2019-10-21
Payer: COMMERCIAL

## 2019-10-21 VITALS
RESPIRATION RATE: 18 BRPM | OXYGEN SATURATION: 98 % | TEMPERATURE: 98 F | HEART RATE: 88 BPM | HEIGHT: 69 IN | SYSTOLIC BLOOD PRESSURE: 100 MMHG | WEIGHT: 198.88 LBS | DIASTOLIC BLOOD PRESSURE: 80 MMHG | BODY MASS INDEX: 29.46 KG/M2

## 2019-10-21 DIAGNOSIS — R59.1 LYMPHADENOPATHY: Primary | ICD-10-CM

## 2019-10-21 DIAGNOSIS — M25.561 CHRONIC PAIN OF RIGHT KNEE: ICD-10-CM

## 2019-10-21 DIAGNOSIS — G89.29 CHRONIC PAIN OF RIGHT KNEE: ICD-10-CM

## 2019-10-21 PROCEDURE — 90471 IMMUNIZATION ADMIN: CPT | Mod: S$GLB,,, | Performed by: FAMILY MEDICINE

## 2019-10-21 PROCEDURE — 90471 FLU VACCINE (QUAD) GREATER THAN OR EQUAL TO 3YO PRESERVATIVE FREE IM: ICD-10-PCS | Mod: S$GLB,,, | Performed by: FAMILY MEDICINE

## 2019-10-21 PROCEDURE — 99999 PR PBB SHADOW E&M-EST. PATIENT-LVL IV: ICD-10-PCS | Mod: PBBFAC,,, | Performed by: FAMILY MEDICINE

## 2019-10-21 PROCEDURE — 99214 PR OFFICE/OUTPT VISIT, EST, LEVL IV, 30-39 MIN: ICD-10-PCS | Mod: 25,S$GLB,, | Performed by: FAMILY MEDICINE

## 2019-10-21 PROCEDURE — 90686 FLU VACCINE (QUAD) GREATER THAN OR EQUAL TO 3YO PRESERVATIVE FREE IM: ICD-10-PCS | Mod: S$GLB,,, | Performed by: FAMILY MEDICINE

## 2019-10-21 PROCEDURE — 90686 IIV4 VACC NO PRSV 0.5 ML IM: CPT | Mod: S$GLB,,, | Performed by: FAMILY MEDICINE

## 2019-10-21 PROCEDURE — 99214 OFFICE O/P EST MOD 30 MIN: CPT | Mod: 25,S$GLB,, | Performed by: FAMILY MEDICINE

## 2019-10-21 PROCEDURE — 99999 PR PBB SHADOW E&M-EST. PATIENT-LVL IV: CPT | Mod: PBBFAC,,, | Performed by: FAMILY MEDICINE

## 2019-10-21 NOTE — ASSESSMENT & PLAN NOTE
- unchanged despite PT  - referral back to Sports Medicine/Ortho for assistance  - pt has seen Dr. Mahmood in the past and recommend schedule follow-up

## 2019-10-21 NOTE — ASSESSMENT & PLAN NOTE
- appear reactive and may be due to URI  - reassurance  - monitor  - counseling on signs and symptoms that I recommend she notify me of an may warrant further evaluation

## 2019-10-21 NOTE — PROGRESS NOTES
"FAMILY MEDICINE    Patient Active Problem List   Diagnosis    Chronic pain of right knee    Moderate episode of recurrent major depressive disorder    Generalized anxiety disorder    Lymphadenopathy       CC:   Chief Complaint   Patient presents with    Lymphadenopathy     cant eat or open mouth     Knee Pain     pt states she feels like physical therapy is not helping        SUBJECTIVE:  Thelma Roman   is a 24 y.o. female  - with anxiety, depression, and chronic knee pain presents with concerns for lymph node swelling and concerns to have knee pain though started PT.      1. Swollen lymph node    Onset: 10/12/19  Location: left neck posterior to jaw angle  Duration: constanct  Character: mild enlargement with tenderness to palpation  Aggravating factors: touching the area  Relieving factors: unsure  Timing of day: anytime  Associated symptoms: mild congestion and cough that started around the same time but is resolving  Negative symptoms: denies redness, drainage, unintentional weight loss, fever, chills, weakness, swelling, difficulty swallowing, throat pain, ear pain    2. Right knee pain > left  - since 15 yo    Chronic and seen by Sports medicine and PT. Started PT about 1 month ago and reports no improvement. Has had to miss work on several occasions due to pain without swelling.      Prior notes: "Initially seen by me for this issues s/p ER visit 2/27/19 following a fall. LAST NOTE: "Pt was recently seen in the ER 2/27/19 for acute right knee pain that started s/p trip and fall at the bottom of a staircase (walking up stairs) about 45 mins prior to her ER visit. She was unable to bear weight s/p the incident with +abrasion was noted with normal ROM and no sensory issues. Pt was able able to bear weight and ambulate by the time she presented to the ER. Xrays were done of the knee and leg and no fracture or dislocation was noted.   Since ER visit, pt reports that knee has continued to improve. Still " "mild area of bruising but no swelling and ambulating well for work. 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 She missed work 2/27/19-3/3/19 and will need an excuse for the days that she missed. No other concerns with her knee"  Pt reports that knee pain has been chronic and was previously evaluated by Dr. Moise her prior PCP and was referred to Dr. Lio Mahmood [facility] and seen on 10/16/18. He evaluated the knee and discussed diagnosis of PFS and recommended icing, NSAIDs, and PT. She was referred to PT at [place]. He also prescribed a right knee brace with hinges for support to use when she is at work. She had an Xray done 10/19/18 s/p persistent pain   10/19/2018   XR KNEE ORTHO RIGHT WITH FLEXION  CLINICAL HISTORY:  Pain in unspecified knee  TECHNIQUE:  AP standing as well as PA flexion standing and Merchant views of both knees were performed.  A lateral view of the right knee is also performed.  FINDINGS:  There is no fracture, dislocation, or bony erosion.  The joint spaces appear satisfactorily preserved.  IMPRESSION  As above.  She also had her PT evaluation on 11/16/18 and reports that she was told everything was fine "It was just my ankle" and was not scheduled for follow-up so she only attended the one session. However I reviewed 11/16/18 noted by PT Ted Downey from [place] and recommendation was for: "Outpatient Physical Therapy 2 times weekly for 6 weeks to include the following interventions: Gait Training, Manual Therapy, Moist Heat/ Ice, Neuromuscular Re-ed, Patient Education, Therapeutic Activites and Therapeutic Exercise."  She continued to have pain and had her follow-up with Dr. Mahmood on 12/27/18 who encouraged her to return to PT, but it appears that she did not. She is no longer wearing her knee brace "I am kind of all or nothing" noting that she would either wear the knee brace all  the time or not all. And reports that when she was instructed not to " "overuse the knee brace since it can lead to muscle weakness and persistent pain issues.   When she saw me 3/19/18 she reports that pain had improved and was doing well prior to her fall. Since the fall pain has worsened and she reports that she has missed several classes at school "I just can't make it to school because of the pain." She had to decreased her class hours and is a jeopardy of losing her financial aid.  She had evaluation with PT on 4/22/19 with recommendations for PT 2 times weekly but has been unable to participate consistently due to work and school session. She has had only one session on 5/8/19. "         ROS: Review of Systems   Constitutional: Negative.    HENT: Positive for congestion.         Otherwise negative   Eyes: Negative.    Respiratory: Positive for cough.         Otherwise negative   Cardiovascular: Negative.    Gastrointestinal: Negative.    Endocrine: Negative.    Genitourinary: Negative.    Musculoskeletal: Positive for arthralgias.        Otherwise negative   Skin: Negative.    Allergic/Immunologic: Negative.    Neurological: Negative.    Hematological: Positive for adenopathy.   Psychiatric/Behavioral: Negative.        Past Medical History:   Diagnosis Date    Anxiety     Depression     Panic disorder without agoraphobia 9/16/2015       Past Surgical History:   Procedure Laterality Date    CARPAL TUNNEL RELEASE      FACIAL RECONSTRUCTION SURGERY      TONSILLECTOMY         History reviewed. No pertinent family history.    Social History     Tobacco Use    Smoking status: Never Smoker    Smokeless tobacco: Never Used   Substance Use Topics    Alcohol use: No     Frequency: Never    Drug use: No       Social History     Social History Narrative    Lives with father. Astranged from her mother. Attends [employer], majoring in psychology with several minors. Works at a furniture sale store.Denies alcohol, smoking or illicit drugs. [employer].        ALLERGIES: Review of patient's " "allergies indicates:  No Known Allergies    MEDS:   Current Outpatient Medications:     etonogestrel (NEXPLANON) 68 mg Impl subdermal device, by Subdermal route. , Disp: , Rfl:     sertraline (ZOLOFT) 100 MG tablet, TAKE 1 TABLET BY MOUTH 1 TIME A DAY WITH 50MG FOR DOSE  MG., Disp: 30 tablet, Rfl: 3    OBJECTIVE:   Vitals:    10/21/19 1001   BP: 100/80   BP Location: Left arm   Patient Position: Sitting   BP Method: X-Large (Manual)   Pulse: 88   Resp: 18   Temp: 98.2 °F (36.8 °C)   TempSrc: Oral   SpO2: 98%   Weight: 90.2 kg (198 lb 14.4 oz)   Height: 5' 9" (1.753 m)     Body mass index is 29.37 kg/m².    Physical Exam   Constitutional: No distress.   HENT:   Head: Normocephalic and atraumatic.   Right Ear: Tympanic membrane and ear canal normal.   Left Ear: Tympanic membrane and ear canal normal.   Nose: Rhinorrhea present. No mucosal edema. Right sinus exhibits no maxillary sinus tenderness and no frontal sinus tenderness. Left sinus exhibits no maxillary sinus tenderness and no frontal sinus tenderness.   Mouth/Throat: Uvula is midline, oropharynx is clear and moist and mucous membranes are normal.   Eyes: Conjunctivae are normal. No scleral icterus.   Neck: Normal range of motion. Neck supple. No thyromegaly present.   Cardiovascular: Normal rate, regular rhythm, normal heart sounds and intact distal pulses. Exam reveals no gallop and no friction rub.   No murmur heard.  Pulmonary/Chest: Effort normal and breath sounds normal. She has no decreased breath sounds. She has no wheezes. She has no rhonchi. She has no rales.   Musculoskeletal: She exhibits no edema.   Lymphadenopathy:        Head (right side): No submental, no submandibular, no preauricular, no posterior auricular and no occipital adenopathy present.        Head (left side): Preauricular (about 0.5 cm mobile, mildly tender, soft, normal overlying skin) adenopathy present. No submental, no submandibular, no posterior auricular and no occipital " adenopathy present.     She has cervical adenopathy.        Right cervical: Posterior cervical (0.3 cm right soft, mobile and nontender) adenopathy present.        Left cervical: Posterior cervical (0.3 cm right soft, mobile and nontende) adenopathy present.     She has no axillary adenopathy.        Right axillary: No pectoral and no lateral adenopathy present.        Left axillary: No pectoral and no lateral adenopathy present.  Neurological: She is alert.       Knee exam:   ROM:     Active extension to 0° bilaterally without hyperextension, lag, crepitus, or patellar J sign.    Active flexion to 135° bilaterally.      Strength: (bilaterally)  Knee Flexion - 5/5  Knee Extension - 5/5  Hip Flexion - 5/5  Hip Extension - 5/5  Ankle dorsiflexion - 5/5  Ankle Plantarflexion - 5/5     Patellofemoral Exam:  Patella position - Neutral   Patellar grind - negative  No patellar laxity with medial and lateral translation   No apprehension with medial and lateral patellar translation.     Depression Patient Health Questionnaire 10/21/2019 9/19/2019 8/14/2019 8/7/2019 5/16/2019 5/7/2019 4/4/2019   Over the last two weeks how often have you been bothered by little interest or pleasure in doing things 1 0 0 0 2 0 1   Over the last two weeks how often have you been bothered by feeling down, depressed or hopeless 1 0 0 0 2 0 1   PHQ-2 Total Score 2 0 0 0 4 0 2   Over the last two weeks how often have you been bothered by trouble falling or staying asleep, or sleeping too much - - - - 0 - -   Over the last two weeks how often have you been bothered by feeling tired or having little energy - - - - 2 - -   Over the last two weeks how often have you been bothered by a poor appetite or overeating - - - - 2 - -   Over the last two weeks how often have you been bothered by feeling bad about yourself - or that you are a failure or have let yourself or your family down - - - - 0 - -   Over the last two weeks how often have you been  bothered by trouble concentrating on things, such as reading the newspaper or watching television - - - - 0 - -   Over the last two weeks how often have you been bothered by moving or speaking so slowly that other people could have noticed. Or the opposite - being so fidgety or restless that you have been moving around a lot more than usual. - - - - 0 - -   Over the last two weeks how often have you been bothered by thoughts that you would be better off dead, or of hurting yourself - - - - 0 - -   If you checked off any problems, how difficult have these problems made it for you to do your work, take care of things at home or get along with other people? - - - - Somewhat difficult - -   Total Score - - - - 8 - -     ASSESSMENT/PLAN:  Problem List Items Addressed This Visit        Orthopedic    Chronic pain of right knee    Current Assessment & Plan     - unchanged despite PT  - referral back to Sports Medicine/Ortho for assistance  - pt has seen Dr. Mahmood in the past and recommend schedule follow-up            Other    Lymphadenopathy - Primary    Current Assessment & Plan     - appear reactive and may be due to URI  - reassurance  - monitor  - counseling on signs and symptoms that I recommend she notify me of an may warrant further evaluation             Follow-up with 3 months or sooner if any issues    Dr. Amy Molina D.O.   Family Medicine

## 2019-10-21 NOTE — LETTER
October 21, 2019      [place] [id number], [place] [address]  [facility] [address]  Phone: [phone]  Fax: [phone]       Patient: Thelma Roman   YOB: 1995  Date of Visit: 10/21/2019    To Whom It May Concern:    Oriana Roman  was at [facility] on 10/21/2019.     She reports that she missed work 10/19/19 and 10/22/19 due to her chronic knee pain. She may return to work.      If you have any questions or concerns, or if I can be of further assistance, please do not hesitate to contact me.    Sincerely,    Amy Molina, DO

## 2019-10-29 ENCOUNTER — OFFICE VISIT (OUTPATIENT)
Dept: SPORTS MEDICINE | Facility: CLINIC | Age: 24
End: 2019-10-29
Payer: COMMERCIAL

## 2019-10-29 VITALS
BODY MASS INDEX: 29.33 KG/M2 | HEIGHT: 69 IN | SYSTOLIC BLOOD PRESSURE: 114 MMHG | HEART RATE: 83 BPM | DIASTOLIC BLOOD PRESSURE: 75 MMHG | WEIGHT: 198 LBS

## 2019-10-29 DIAGNOSIS — M22.2X1 PATELLOFEMORAL SYNDROME, RIGHT: ICD-10-CM

## 2019-10-29 DIAGNOSIS — G89.29 CHRONIC PAIN OF RIGHT KNEE: Primary | ICD-10-CM

## 2019-10-29 DIAGNOSIS — M25.561 CHRONIC PAIN OF RIGHT KNEE: Primary | ICD-10-CM

## 2019-10-29 DIAGNOSIS — M25.461 SWELLING OF JOINT, KNEE, RIGHT: ICD-10-CM

## 2019-10-29 PROCEDURE — 99214 OFFICE O/P EST MOD 30 MIN: CPT | Mod: S$GLB,,, | Performed by: ORTHOPAEDIC SURGERY

## 2019-10-29 PROCEDURE — 99214 PR OFFICE/OUTPT VISIT, EST, LEVL IV, 30-39 MIN: ICD-10-PCS | Mod: S$GLB,,, | Performed by: ORTHOPAEDIC SURGERY

## 2019-10-29 PROCEDURE — 99999 PR PBB SHADOW E&M-EST. PATIENT-LVL III: ICD-10-PCS | Mod: PBBFAC,,, | Performed by: ORTHOPAEDIC SURGERY

## 2019-10-29 PROCEDURE — 99999 PR PBB SHADOW E&M-EST. PATIENT-LVL III: CPT | Mod: PBBFAC,,, | Performed by: ORTHOPAEDIC SURGERY

## 2019-10-29 NOTE — PROGRESS NOTES
CC: right knee pain    Thelma is here today for follow up evaluation of her right knee pain. Patient reports she has finished another course of formal physical therapy and did not experience a benefit with this again. She also states she was compliant with her HEP in addition to wearing a knee brace, obtaining custom shoes, and shoe inserts without any improvement in her symptoms. She states none of these treatment have produced benefit. Her physical therapist told her she may have a baker's cyst and that is contributing her now additional posterior knee pain. She has had to miss some days of class due to severe knee pain and her inability to walk due to this.  She denies any instability at this time, but does admit to some intermittent swelling that exacerbates her pain.    Recall from visit on 12/7/2019  24 y.o. Female presents today for follow up evaluation of her right knee pain.     How long: Patient states she feels the same as she did at her last visit. Patient did obtain a knee brace which helped at first but she has stopped wearing it because she did not feel like it was helping. She states her pain has not been bad enough to need to take medicine for pain. Patient states her pain comes and goes. She went to one visit for PT but did not go to any further. She does state at therapy they spoke about her ankle mechanics and adjusting those. She has worn an ankle brace recently when she states did help with her symptoms.  What makes it better: Patient states resting makes her feel better.  What makes it worse: Patient states she will feel the worst after walking and at the end of the day.  Does it radiate: Patient denies any radiating symptoms.   Attempted treatments: Patient went to PT once and states the PT told her to change the way she is walking and believes her problem lies in her ankle.   Pain score: Patient states her pain is 2/10 right now. At its worst her pain is 6/10.   History of trauma/injury:  "Patient denies any new trauma.   Affecting ADLs: Patient states if she needs to run she is unable due to pain. Patient does feel like she needs to take breaks at work.   Any mechanical symptoms: Patient states she still hears a grinding sound in her knee and a subtle crack similar to cracking fingers.   Feelings of instability: Patient denies any feelings of instability.      REVIEW OF SYSTEMS:   Constitution: Patient denies fever or chills.  Eyes: Patient denies eye pain or vision changes.  CVS: Patient denies chest pain.  Lungs: Patient denies shortness of breath or cough.  Skin: Patient denies skin rash or itching.    Musculoskeletal: Patient denies recent falls. See HPI.  Psych: Patient states she has anxiety and is on medication for this. She feels she has this well controlled.     PAST MEDICAL HISTORY:   Past Medical History:   Diagnosis Date    Anxiety     Depression     Panic disorder without agoraphobia 9/16/2015       MEDICATIONS:     Current Outpatient Medications:     etonogestrel (NEXPLANON) 68 mg Impl subdermal device, by Subdermal route. , Disp: , Rfl:     sertraline (ZOLOFT) 100 MG tablet, TAKE 1 TABLET BY MOUTH 1 TIME A DAY WITH 50MG FOR DOSE  MG. (Patient not taking: Reported on 10/29/2019), Disp: 30 tablet, Rfl: 3    ALLERGIES:   Review of patient's allergies indicates:  No Known Allergies     PHYSICAL EXAMINATION:  /75   Pulse 83   Ht 5' 9" (1.753 m)   Wt 89.8 kg (198 lb)   LMP 10/18/2019   BMI 29.24 kg/m²   Vitals signs and nursing note have been reviewed.  General: In no acute distress, well developed, well nourished, no diaphoresis  Eyes: EOM full and smooth, no eye redness or discharge  HENT: normocephalic and atraumatic, neck supple, trachea midline, no nasal discharge  Cardiovascular: no LE edema  Lungs: respirations non-labored, no conversational dyspnea   Neuro: AAOx3, CN2-12 grossly intact  Skin: No rashes, warm and dry  Psychiatric: cooperative, pleasant, mood and " affect appropriate for age    MUSCULOSKELETAL EXAM:    RIGHT KNEE EXAMINATION   Affected side is compared to contralateral knee     Observation:  No edema, erythema, ecchymosis, or effusion noted.  No muscle atrophy of the thighs and calves noted.  No obvious bony deformities noted.   No genu valgus/varum noted. No recurvatum noted.    No tibial internal/external torsion.    Pes planus present bilaterally  Medial ankle collapse with standing right worse than left.    ROM:   Active extension to 0° bilaterally without hyperextension, lag, crepitus, or patellar J sign.    Active flexion to 135° bilaterally.    Strength: (bilaterally)  Knee Flexion - 5/5  Knee Extension - 5/5  Hip Flexion - 5/5  Hip Extension - 5/5  Ankle dorsiflexion - 5/5  Ankle Plantarflexion - 5/5    Patellofemoral Exam:  Patella position - Neutral   Patellar ballottement - negative  Bulge sign - negative  Patellar grind - negative  No patellar laxity with medial and lateral translation   No apprehension with medial and lateral patellar translation.     Meniscus Exam:  Pain present with forced flexion and terminal extension  Fernando's test - negative for clicks, positive for pain  Bounce home test - negative    Ligament exam:  Lachman's test - negative  Posterior drawer test - negative  Anterior drawer test - negative  No laxity or pain with varus or valgus testing at 0 and 30° flexion    Neurovascular Examination:   Normal gait without antalgia.  Sensation intact to light touch in the obturator, lateral/intermediate/medial/posterior femoral cutaneous, saphenous, and common peroneal nerves bilaterally.  Pulses intact at the DP and PT arteries bilaterally.    Capillary refill intact <2 seconds in all toes bilaterally.      ASSESSMENT:      ICD-10-CM ICD-9-CM   1. Chronic pain of right knee M25.561 719.46    G89.29 338.29   2. Swelling of joint, knee, right M25.461 719.06   3. Patellofemoral syndrome, right M22.2X1 719.46       PLAN:  1-3. Right knee  pain/swelling/patellofemoral syndrome -     - Thelma has now been through to full courses of physical therapy without any improvement in her knee pain.  She continues to wear her brace, do her HEP, and address her foot mechanics without any improvement.    - MRI ordered and scheduled due to ongoing right knee pain without improvement over the past year with the above treatment modalities.      Future planning includes - next steps pending MRI    All questions were answered to the best of my ability and all concerns were addressed at this time.    Follow up pending MRI results, or sooner if needed.

## 2019-10-29 NOTE — LETTER
October 29, 2019      [facility]  [address]  [facility] [address]  Phone: [phone]       Patient: Thelma Roman   YOB: 1995  Date of Visit: 10/29/2019    To Whom It May Concern:    Oriana Roman was evaluated by me at [facility] on 10/29/2019. If you have any questions or concerns, or if I can be of further assistance, please do not hesitate to contact me.    Sincerely,      Dr. Lio Mahmood, DO

## 2019-11-04 ENCOUNTER — HOSPITAL ENCOUNTER (OUTPATIENT)
Dept: RADIOLOGY | Facility: HOSPITAL | Age: 24
Discharge: HOME | End: 2019-11-04
Attending: ORTHOPAEDIC SURGERY
Payer: COMMERCIAL

## 2019-11-04 DIAGNOSIS — M25.561 CHRONIC PAIN OF RIGHT KNEE: ICD-10-CM

## 2019-11-04 DIAGNOSIS — M25.461 SWELLING OF JOINT, KNEE, RIGHT: ICD-10-CM

## 2019-11-04 DIAGNOSIS — G89.29 CHRONIC PAIN OF RIGHT KNEE: ICD-10-CM

## 2019-11-04 PROCEDURE — 73721 MRI JNT OF LWR EXTRE W/O DYE: CPT | Mod: TC,RT

## 2019-11-04 PROCEDURE — 73721 MRI JNT OF LWR EXTRE W/O DYE: CPT | Mod: 26,RT,, | Performed by: RADIOLOGY

## 2019-11-04 PROCEDURE — 73721 MRI KNEE WITHOUT CONTRAST RIGHT: ICD-10-PCS | Mod: 26,RT,, | Performed by: RADIOLOGY

## 2019-11-14 ENCOUNTER — PATIENT MESSAGE (OUTPATIENT)
Dept: SPORTS MEDICINE | Facility: CLINIC | Age: 24
End: 2019-11-14

## 2019-12-14 ENCOUNTER — PATIENT MESSAGE (OUTPATIENT)
Dept: FAMILY MEDICINE | Facility: CLINIC | Age: 24
End: 2019-12-14

## 2019-12-18 ENCOUNTER — TELEPHONE (OUTPATIENT)
Dept: FAMILY MEDICINE | Facility: CLINIC | Age: 24
End: 2019-12-18

## 2019-12-18 NOTE — TELEPHONE ENCOUNTER
----- Message from Hawa Tamayo sent at 12/18/2019 11:57 AM CST -----  Contact: Vick nelson/ [facility] [phone]  Vick is calling to give office her contact information if needed for pt's care [phone].

## 2020-01-15 ENCOUNTER — OFFICE VISIT (OUTPATIENT)
Dept: URGENT CARE | Facility: CLINIC | Age: 25
End: 2020-01-15
Payer: COMMERCIAL

## 2020-01-15 VITALS
RESPIRATION RATE: 16 BRPM | OXYGEN SATURATION: 100 % | HEIGHT: 69 IN | TEMPERATURE: 98 F | BODY MASS INDEX: 29.33 KG/M2 | HEART RATE: 78 BPM | WEIGHT: 198 LBS

## 2020-01-15 DIAGNOSIS — S60.449A CONSTRICTIVE JEWELRY OF FINGER, INITIAL ENCOUNTER: Primary | ICD-10-CM
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
## 2020-01-15 PROCEDURE — 99214 OFFICE O/P EST MOD 30 MIN: CPT | Mod: S$GLB,,, | Performed by: EMERGENCY MEDICINE

## 2020-01-15 PROCEDURE — 99214 PR OFFICE/OUTPT VISIT, EST, LEVL IV, 30-39 MIN: ICD-10-PCS | Mod: S$GLB,,, | Performed by: EMERGENCY MEDICINE

## 2020-01-15 NOTE — PROGRESS NOTES
"[facility] - Visit Note                                           Chief Complaint  24 y.o. female with Hand Pain    History of Present Illness  Thelma Roman presents to the urgent care with complaints of a ring stuck on her right ring finger.  Patient has tried lotion and cold water without success.  She bent the ring and is now unable to get it over her knuckle.  She denies numbness or inability to move her finger.    Past Medical History:   Diagnosis Date    Anxiety     Depression     Panic disorder without agoraphobia 9/16/2015     Past Surgical History:   Procedure Laterality Date    CARPAL TUNNEL RELEASE      FACIAL RECONSTRUCTION SURGERY      TONSILLECTOMY        Review of patient's allergies indicates:  No Known Allergies     Review of Systems and Physical Exam     Review of Systems  -- Constitution - no fever, no weight loss, no loss of consciousness  -- Eyes - no changes in vision, no redness, no swelling, no discharge  -- Ear, Nose - no  earache, no loss of hearing, no epistaxis  -- Mouth,Throat - no sore throat, no toothache, normal voice, normal swallowing  -- Respiratory - denies cough and congestion, no shortness of breath, no wheezing, no increased WOB   -- Cardiovascular - denies chest pain, no palpitations, no lower extremity edema  -- Gastrointestinal - denies abdominal pain, denies nausea, vomiting, and diarrhea  -- Genitourinary - no dysuria, denies flank pain, no hematuria or frequency   -- Musculoskeletal - denies back pain, negative for myalgias and arthralgias reports ring stuck on right finger  -- Neurological - no headache, no neurologic changes, no loss of bladder or bowel function no seizure like activity, no changes in hearing or vision  -- Skin - denies skin changes, no rash, no hives, no suspected skin infection    Vital Signs   height is 5' 9" (1.753 m) and weight is 89.8 kg (198 lb). Her temperature is 98.4 °F (36.9 °C). Her pulse is 78. Her respiration is 16 and " oxygen saturation is 100%.      Physical Exam  -- Nursing note and vitals reviewed -   -- Constitutional:  Awake alert and oriented, GCS 15, no acute distress.  Appears well.  -- Head: Atraumatic. Normocephalic. No obvious abnormality  -- Eyes: Pupils are equal and reactive to light. Extraocular movements intact. No nystagmus.  No periorbital swelling. Normal conjunctiva.  -- Nose: Nose grossly normal in appearance, nares grossly normal. No rhinorrhea.  -- Throat: Mucous membranes moist, pharynx normal, normal tonsils.  Airway patent.  -- Ears: External ears and TM normal bilaterally. Normal hearing.   -- Neck: Normal range of motion. Neck supple. No meningismus. No adenopathy  -- Cardiac: Normal rate, regular rhythm and normal heart sounds. No carotid bruit. No lower extremity edema.  -- Pulmonary: Normal respiratory effort, breath sounds equal bilaterally. Adequate flow.  No wheezing.  No crackles. No increased work of breathing  -- Abdominal: Soft, no tenderness, no guarding, no rebound. Normal bowel sounds.   -- Musculoskeletal: Normal range of motion, all 4 extremities 5/5 strength.  Neurovascularly intact. Atraumatic. No deformities. Right 4th digit with retained foreign object, ring.  Neurovascularly intact  -- Neurological:  Cranial nerves 2-12 grossly intact. No focal deficits.   -- Vascular: Posterior tibial, dorsalis pedis and radial pulses 2+ bilaterally    -- Lymphatics: No cervical or peripheral lymphadenopathy.   -- Skin: Warm and dry. No evidence of rash or cellulitis  -- Psychiatric: Normal mood and affect. Bedside behavior is appropriate.  Patient is cooperative.  Denies suicidal homicidal ideation.    Emergency Room Course     Treatment Course, Evaluation, and Medical Decision Makin.  Physical exam unremarkable, bent ring noted on right ring finger  2.  Attempt with lotion unsuccessful  3.  Ring cutter used to remove ring.  No complications      Diagnosis  -- ring removal    Disposition and  Plan  -- Disposition: home  -- Condition: stable  -- Follow-up: Patient to follow up with Amy Molina DO in 1-2 days, and any specialists noted on discharge paperwork  -- I advised the patient that we have found no life threatening condition today and have provided recommendations his/her care  -- At this time, I believe the patient is clinically stable for discharge.   -- The patient acknowledges that ongoing follow up with a MD is required   -- Patient agrees to comply with all instruction and direction given in the urgent care  -- Patient counseled on strict return precautions as discussed

## 2020-01-15 NOTE — PATIENT INSTRUCTIONS
You have been evaluated in the urgent care for removal of jewelry.  Your ring has been removed and returned to you.  Please follow-up with primary care for any further problems

## 2020-02-10 ENCOUNTER — PATIENT OUTREACH (OUTPATIENT)
Dept: ADMINISTRATIVE | Facility: HOSPITAL | Age: 25
End: 2020-02-10

## 2020-03-04 RX ORDER — SERTRALINE HYDROCHLORIDE 100 MG/1
TABLET, FILM COATED ORAL
Qty: 30 TABLET | Refills: 5 | Status: SHIPPED | OUTPATIENT
Start: 2020-03-04 | End: 2020-05-19 | Stop reason: SDUPTHER

## 2020-03-30 ENCOUNTER — DOCUMENTATION ONLY (OUTPATIENT)
Dept: REHABILITATION | Facility: HOSPITAL | Age: 25
End: 2020-03-30

## 2020-03-30 NOTE — PROGRESS NOTES
Outpatient Therapy Discharge Summary     Name: Thelma Roman  Clinic Number: [id number]    Therapy Diagnosis: No diagnosis found.  Physician: Amy Molina DO     Physician Orders: PT Eval and Treat   Medical Diagnosis from Referral:   M25.561,G89.29 (ICD-10-CM) - Chronic pain of right knee   M25.861 (ICD-10-CM) - Patellofemoral dysfunction of right knee         Date of Last visit: 5/8/19  Total Visits Received: 2      Assessment    Goals: Goals:  Short Term Goals:  4 weeks     - Pt will be able to perform a squat without therapist cues an no evidence of medial knee collapse in order to demo improved body awareness and hip strength.  -Pt will be able to bear weight on her R LE for at least 30 seconds with no increase in knee pain or pressure in order to help reduce pain with activities requiring single limb loading such as amb.  - Pt will be independent and consistent with issued HEP in order to show carryover between therapy sessions.     Long Term Goals: 8 weeks     - Pt will increase B hip abd strength by 1 ms grade in order to increase tolerance to functional activities and ADLs.  - Pt will report at CJ level (20-40% impaired) on FOTO knee survey score for knee pain disability to demonstrate an increase in LE function and mobility in home and community environment.   - Pt will be independent with updated HEP to maintain gains following discharge with therapy.         Discharge reason: Patient has not attended therapy since 5/8/19    Plan   This patient is discharged from Physical Therapy

## 2020-05-19 DIAGNOSIS — F41.1 GENERALIZED ANXIETY DISORDER: ICD-10-CM

## 2020-05-19 DIAGNOSIS — F33.1 MODERATE EPISODE OF RECURRENT MAJOR DEPRESSIVE DISORDER: Primary | ICD-10-CM

## 2020-05-19 RX ORDER — SERTRALINE HYDROCHLORIDE 100 MG/1
TABLET, FILM COATED ORAL
Qty: 90 TABLET | Refills: 1 | Status: SHIPPED | OUTPATIENT
Start: 2020-05-19 | End: 2020-10-09

## 2020-05-19 NOTE — TELEPHONE ENCOUNTER
----- Message from Joshua Gregory sent at 5/19/2020 12:58 PM CDT -----  Contact: [facility]# [phone]  Refill request for sertraline (ZOLOFT) 100 MG tablet  PHARMACY: [facility]  # [phone]   Fax# [phone]

## 2020-06-27 ENCOUNTER — PATIENT MESSAGE (OUTPATIENT)
Dept: FAMILY MEDICINE | Facility: CLINIC | Age: 25
End: 2020-06-27

## 2020-06-27 DIAGNOSIS — B34.9 VIRAL ILLNESS: Primary | ICD-10-CM

## 2020-06-29 NOTE — TELEPHONE ENCOUNTER
Please call and scheduled patient for covid testing  Dr. Amy Molina D.O.   [facility]

## 2020-09-10 ENCOUNTER — PATIENT MESSAGE (OUTPATIENT)
Dept: FAMILY MEDICINE | Facility: CLINIC | Age: 25
End: 2020-09-10

## 2020-09-24 ENCOUNTER — PATIENT MESSAGE (OUTPATIENT)
Dept: FAMILY MEDICINE | Facility: CLINIC | Age: 25
End: 2020-09-24

## 2020-09-28 ENCOUNTER — PATIENT MESSAGE (OUTPATIENT)
Dept: FAMILY MEDICINE | Facility: CLINIC | Age: 25
End: 2020-09-28

## 2020-09-28 ENCOUNTER — OFFICE VISIT (OUTPATIENT)
Dept: URGENT CARE | Facility: CLINIC | Age: 25
End: 2020-09-28
Payer: COMMERCIAL

## 2020-09-28 VITALS
SYSTOLIC BLOOD PRESSURE: 125 MMHG | HEIGHT: 69 IN | WEIGHT: 230 LBS | DIASTOLIC BLOOD PRESSURE: 70 MMHG | OXYGEN SATURATION: 99 % | TEMPERATURE: 98 F | HEART RATE: 78 BPM | BODY MASS INDEX: 34.07 KG/M2

## 2020-09-28 DIAGNOSIS — R51.9 INTRACTABLE HEADACHE, UNSPECIFIED CHRONICITY PATTERN, UNSPECIFIED HEADACHE TYPE: ICD-10-CM

## 2020-09-28 DIAGNOSIS — S16.1XXA STRAIN OF NECK MUSCLE, INITIAL ENCOUNTER: Primary | ICD-10-CM

## 2020-09-28 LAB
CTP QC/QA: YES
SARS-COV-2 RDRP RESP QL NAA+PROBE: NEGATIVE

## 2020-09-28 PROCEDURE — 96372 PR INJECTION,THERAP/PROPH/DIAG2ST, IM OR SUBCUT: ICD-10-PCS | Mod: S$GLB,,, | Performed by: NURSE PRACTITIONER

## 2020-09-28 PROCEDURE — 99214 PR OFFICE/OUTPT VISIT, EST, LEVL IV, 30-39 MIN: ICD-10-PCS | Mod: 25,S$GLB,, | Performed by: NURSE PRACTITIONER

## 2020-09-28 PROCEDURE — 72040 XR CERVICAL SPINE 2 OR 3 VIEWS: ICD-10-PCS | Mod: FY,S$GLB,, | Performed by: RADIOLOGY

## 2020-09-28 PROCEDURE — 99214 OFFICE O/P EST MOD 30 MIN: CPT | Mod: 25,S$GLB,, | Performed by: NURSE PRACTITIONER

## 2020-09-28 PROCEDURE — U0002 COVID-19 LAB TEST NON-CDC: HCPCS | Mod: QW,S$GLB,, | Performed by: NURSE PRACTITIONER

## 2020-09-28 PROCEDURE — 96372 THER/PROPH/DIAG INJ SC/IM: CPT | Mod: S$GLB,,, | Performed by: NURSE PRACTITIONER

## 2020-09-28 PROCEDURE — 72040 X-RAY EXAM NECK SPINE 2-3 VW: CPT | Mod: FY,S$GLB,, | Performed by: RADIOLOGY

## 2020-09-28 PROCEDURE — U0002: ICD-10-PCS | Mod: QW,S$GLB,, | Performed by: NURSE PRACTITIONER

## 2020-09-28 RX ORDER — KETOROLAC TROMETHAMINE 30 MG/ML
30 INJECTION, SOLUTION INTRAMUSCULAR; INTRAVENOUS
Status: COMPLETED | OUTPATIENT
Start: 2020-09-28 | End: 2020-09-28

## 2020-09-28 RX ORDER — METHOCARBAMOL 500 MG/1
500 TABLET, FILM COATED ORAL 3 TIMES DAILY
Qty: 21 TABLET | Refills: 0 | Status: SHIPPED | OUTPATIENT
Start: 2020-09-28 | End: 2020-10-05

## 2020-09-28 RX ORDER — NAPROXEN 500 MG/1
500 TABLET ORAL 2 TIMES DAILY WITH MEALS
Qty: 20 TABLET | Refills: 0 | Status: SHIPPED | OUTPATIENT
Start: 2020-09-28 | End: 2020-10-08

## 2020-09-28 RX ADMIN — KETOROLAC TROMETHAMINE 30 MG: 30 INJECTION, SOLUTION INTRAMUSCULAR; INTRAVENOUS at 12:09

## 2020-09-28 NOTE — LETTER
September 28, 2020      [facility]  [address] [address], [address]  [place] [address]  Phone: [phone]  Fax: [phone]       Patient: Thelma Roman   YOB: 1995  Date of Visit: 09/28/2020    To Whom It May Concern:    Oriana Roman  was at [facility] on 09/28/2020. She may return to work/school on 9/30/2020 with no restrictions. If you have any questions or concerns, or if I can be of further assistance, please do not hesitate to contact me.    Sincerely,      Irene Maher, NP

## 2020-09-28 NOTE — PATIENT INSTRUCTIONS
Please follow up with your Primary care provider within 2-5 days if your signs and symptoms have not resolved or worsen.     If your condition worsens or fails to improve we recommend that you receive another evaluation at the emergency room immediately or contact your primary medical clinic to discuss your concerns.    You must understand that you have received an Urgent Care treatment only and that you may be released before all of your medical problems are known or treated.   You, the patient, will arrange for follow up care as instructed.       ORTHO    R.I.C.E. to the affected joint or limb as needed:    Rest- the injured or sore extremity.  Ice- for the next 24-48 hours, alternating 20 minutes on and 20 minutes off as needed up to 3 times a day.   Compression-Use bandages to stabilize injured limb.  Check circulation to make sure  bandage is not too tight.    Elevate-when possible to reduce swelling.      Ice 20 minutes on and 20 minutes off as needed for pain.     Please drink plenty of fluids.    Please get plenty of rest.    Please return here or go to the Emergency Department for any concerns or worsening of condition.    Please be aware that narcotics can be addictive. If I gave you narcotics, I have given you a limited quantity to take as it is needed at this time. However take it sparingly and only when needed.  Do not operate machinery or drive on this medication.      If you were not prescribed an anti-inflammatory medication, and if you do not have any history of stomach/intestinal ulcers, or kidney disease, or are not taking a blood thinner such as Coumadin, Plavix, Pradaxa, Eloquis, or Xaralta for example, it is OK to take over the counter Ibuprofen or Advil or Motrin or Aleve as directed.  Do not take these medications on an empty stomach.    If you were given a splint wear it at all times unless otherwise instructed.     If you were given crutches use them as we instructed. Do not rest your  armpits on the foam pad or you risk compressing the nerves and the vessels there.    Please follow up with your primary care doctor or specialist as needed.    If you lose control of your bowel and/or bladder, please go to the nearest Emergency Department immediately.  If you lose sensation in between your legs by your genitalia and/or rectum, please go to the nearest Emergency Department immediately.  If you lose control or sensation of any extremity, please go to the nearest Emergency Department immediately.      Neck Sprain or Strain  A sudden force that causes turning or bending of the neck can cause sprain or strain. An example would be the force from a car accident. This can stretch or tear muscles called a strain. It can also stretch or tear ligaments called a sprain. Either of these can cause neck pain. Sometimes neck pain occurs after a simple awkward movement. In either case, muscle spasm is commonly present and contributes to the pain.     Unless you had a forceful physical injury (for example, a car accident or fall), X-rays are usually not ordered for the initial evaluation of neck pain. If pain continues and dose not respond to medical treatment, X-rays and other tests may be performed at a later time.  Home care  · You may feel more soreness and spasm the first few days after the injury. Rest until symptoms begin to improve.  · When lying down, use a comfortable pillow or a rolled towel that supports the head and keeps the spine in a neutral position. The position of the head should not be tilted forward or backward.  · Apply an ice pack over the injured area for 15 to 20 minutes every 3 to 6 hours. You should do this for the first 24 to 48 hours. You can make an ice pack by filling a plastic bag that seals at the top with ice cubes and then wrapping it with a thin towel. After 48 hours, apply heat (warm shower or warm bath) for 15 to 20 minutes several times a day, or alternate ice and heat.  · You  may use over-the-counter pain medicine to control pain, unless another pain medicine was prescribed. If you have chronic liver or kidney disease or ever had a stomach ulcer or GI bleeding, talk with your healthcare provider before using these medicines.  · If a soft cervical collar was prescribed, it should be worn only for periods of increased pain. It should not be worn for more than 3 hours a day, or for a period longer than 1 to 2 weeks.  Follow-up care  Follow up with your healthcare provider as directed. Physical therapy may be needed.  Sometimes fractures dont show up on the first X-ray. Bruises and sprains can sometimes hurt as much as a fracture. These injuries can take time to heal completely. If your symptoms dont improve or they get worse, talk with your healthcare provider. You may need a repeat X-ray or other tests. If X-rays were taken, you will be told of any new findings that may affect your care.  Call 911  Call 911 if you have:  · Neck swelling, difficulty or painful swallowing  · Difficulty breathing  · Chest pain  When to seek medical advice  Call your healthcare provider right away if any of these occur:  · Pain becomes worse or spreads into your arms  · Weakness or numbness in one or both arms  Date Last Reviewed: 11/19/2015  © 1545-7323 "[employer]. [address] [place] [address], [place] [address]. All rights reserved. This information is not intended as a substitute for professional medical care. Always follow your healthcare professional's instructions.

## 2020-09-28 NOTE — PROGRESS NOTES
"Subjective:       Patient ID: Thelma Roman is a 25 y.o. female.    Vitals:  height is 5' 9" (1.753 m) and weight is 104.3 kg (230 lb). Her temporal temperature is 98.2 °F (36.8 °C). Her blood pressure is 125/70 and her pulse is 78. Her oxygen saturation is 99%.     Chief Complaint: Headache    Patient was having intercourse on her back and had her head hanging off the bed (upside down).  States she noted intense pain with that position, then dull ache in posterior skull and left side of neck and down to shoulder.  States was intense pain at neck and head with nausea noted during the initial incident.  She placed ice pack on her neck, it improved after a period.  She took ibuprofen 800 mg at 2AM with significant improvement 1 hour after taking the medication.  States it still has lingering pain to head and neck area with TTP of her posterior skull, left side of neck, and down left shoulder.      Headache   This is a new problem. The current episode started yesterday. The problem occurs constantly. The problem has been unchanged. The pain is located in the occipital region. The pain radiates to the left neck and right neck. The pain quality is not similar to prior headaches. The quality of the pain is described as throbbing and sharp. The pain is at a severity of 10/10 (currently 5/10). The pain is severe. Associated symptoms include nausea (resolved). Pertinent negatives include no blurred vision, coughing, dizziness, eye pain, fever, loss of balance, neck pain, photophobia, tinnitus, vomiting or weakness. Associated symptoms comments: Patient states blurred vision at time of onset and has dissolved . The symptoms are aggravated by sneezing and coughing. She has tried NSAIDs for the symptoms. The treatment provided no relief. There is no history of migraine headaches or migraines in the family.       Constitution: Negative for chills, sweating and fever.   HENT: Negative for tinnitus, facial swelling, congestion " and sinus pain.    Neck: Negative for neck pain and neck stiffness.   Eyes: Negative for eye pain, photophobia, vision loss, double vision and blurred vision.   Respiratory: Negative for cough.    Gastrointestinal: Positive for nausea (resolved). Negative for vomiting.   Genitourinary: Negative for missed menses.   Musculoskeletal: Negative for trauma and muscle ache.   Skin: Negative for rash, wound and lesion.   Neurological: Positive for headaches. Negative for dizziness, history of vertigo, light-headedness, facial drooping, speech difficulty, coordination disturbances, loss of balance, history of migraines, disorientation and loss of consciousness.   Psychiatric/Behavioral: Negative for disorientation, confusion, nervous/anxious, sleep disturbance and depression. The patient is not nervous/anxious.        Objective:      Physical Exam   Constitutional: She is oriented to person, place, and time. She appears well-developed. She is cooperative. No distress.   HENT:   Head: Normocephalic and atraumatic.       Nose: Nose normal.   Eyes: Pupils are equal, round, and reactive to light. Conjunctivae and lids are normal. No visual field deficit is present. No scleral icterus. Pupils are equal. periorbital hyperpigmentation  Cardiovascular: Normal rate, regular rhythm, normal heart sounds and normal pulses.   Pulmonary/Chest: Effort normal and breath sounds normal.   Musculoskeletal:         General: No deformity.      Cervical back: She exhibits decreased range of motion, tenderness (TTP, see diagram), bony tenderness, pain and spasm. She exhibits no swelling, no edema, no deformity, no laceration and normal pulse.        Back:    Neurological: She is alert and oriented to person, place, and time. She has normal motor skills, normal sensation, normal strength, normal reflexes and intact cranial nerves. She is not disoriented. No sensory deficit. Gait and coordination normal. GCS eye subscore is 4. GCS verbal subscore  is 5. GCS motor subscore is 6.   Skin: Skin is warm, dry, intact and not diaphoretic. Psychiatric: Her speech is normal and behavior is normal. Judgment and thought content normal.   Nursing note and vitals reviewed.        Assessment:       1. Intractable headache, unspecified chronicity pattern, unspecified headache type    2. Strain of neck muscle, initial encounter        Plan:       Results for orders placed or performed in visit on 09/28/20   POCT COVID-19 Rapid Screening   Result Value Ref Range    POC Rapid COVID Negative Negative     Acceptable Yes        Intractable headache, unspecified chronicity pattern, unspecified headache type  -     POCT COVID-19 Rapid Screening  -     ketorolac injection 30 mg  -     naproxen (NAPROSYN) 500 MG tablet; Take 1 tablet (500 mg total) by mouth 2 (two) times daily with meals. As needed for pain. for 10 days  Dispense: 20 tablet; Refill: 0    Strain of neck muscle, initial encounter  -     ketorolac injection 30 mg  -     naproxen (NAPROSYN) 500 MG tablet; Take 1 tablet (500 mg total) by mouth 2 (two) times daily with meals. As needed for pain. for 10 days  Dispense: 20 tablet; Refill: 0  -     XR Cervical Spine 2 or 3 Views; Future; Expected date: 09/28/2020      Patient Instructions   Please follow up with your Primary care provider within 2-5 days if your signs and symptoms have not resolved or worsen.     If your condition worsens or fails to improve we recommend that you receive another evaluation at the emergency room immediately or contact your primary medical clinic to discuss your concerns.    You must understand that you have received an Urgent Care treatment only and that you may be released before all of your medical problems are known or treated.   You, the patient, will arrange for follow up care as instructed.       ORTHO    R.I.C.E. to the affected joint or limb as needed:    Rest- the injured or sore extremity.  Ice- for the next 24-48  hours, alternating 20 minutes on and 20 minutes off as needed up to 3 times a day.   Compression-Use bandages to stabilize injured limb.  Check circulation to make sure  bandage is not too tight.    Elevate-when possible to reduce swelling.      Ice 20 minutes on and 20 minutes off as needed for pain.     Please drink plenty of fluids.    Please get plenty of rest.    Please return here or go to the Emergency Department for any concerns or worsening of condition.    Please be aware that narcotics can be addictive. If I gave you narcotics, I have given you a limited quantity to take as it is needed at this time. However take it sparingly and only when needed.  Do not operate machinery or drive on this medication.      If you were not prescribed an anti-inflammatory medication, and if you do not have any history of stomach/intestinal ulcers, or kidney disease, or are not taking a blood thinner such as Coumadin, Plavix, Pradaxa, Eloquis, or Xaralta for example, it is OK to take over the counter Ibuprofen or Advil or Motrin or Aleve as directed.  Do not take these medications on an empty stomach.    If you were given a splint wear it at all times unless otherwise instructed.     If you were given crutches use them as we instructed. Do not rest your armpits on the foam pad or you risk compressing the nerves and the vessels there.    Please follow up with your primary care doctor or specialist as needed.    If you lose control of your bowel and/or bladder, please go to the nearest Emergency Department immediately.  If you lose sensation in between your legs by your genitalia and/or rectum, please go to the nearest Emergency Department immediately.  If you lose control or sensation of any extremity, please go to the nearest Emergency Department immediately.      Neck Sprain or Strain  A sudden force that causes turning or bending of the neck can cause sprain or strain. An example would be the force from a car accident.  This can stretch or tear muscles called a strain. It can also stretch or tear ligaments called a sprain. Either of these can cause neck pain. Sometimes neck pain occurs after a simple awkward movement. In either case, muscle spasm is commonly present and contributes to the pain.     Unless you had a forceful physical injury (for example, a car accident or fall), X-rays are usually not ordered for the initial evaluation of neck pain. If pain continues and dose not respond to medical treatment, X-rays and other tests may be performed at a later time.  Home care  · You may feel more soreness and spasm the first few days after the injury. Rest until symptoms begin to improve.  · When lying down, use a comfortable pillow or a rolled towel that supports the head and keeps the spine in a neutral position. The position of the head should not be tilted forward or backward.  · Apply an ice pack over the injured area for 15 to 20 minutes every 3 to 6 hours. You should do this for the first 24 to 48 hours. You can make an ice pack by filling a plastic bag that seals at the top with ice cubes and then wrapping it with a thin towel. After 48 hours, apply heat (warm shower or warm bath) for 15 to 20 minutes several times a day, or alternate ice and heat.  · You may use over-the-counter pain medicine to control pain, unless another pain medicine was prescribed. If you have chronic liver or kidney disease or ever had a stomach ulcer or GI bleeding, talk with your healthcare provider before using these medicines.  · If a soft cervical collar was prescribed, it should be worn only for periods of increased pain. It should not be worn for more than 3 hours a day, or for a period longer than 1 to 2 weeks.  Follow-up care  Follow up with your healthcare provider as directed. Physical therapy may be needed.  Sometimes fractures dont show up on the first X-ray. Bruises and sprains can sometimes hurt as much as a fracture. These injuries  can take time to heal completely. If your symptoms dont improve or they get worse, talk with your healthcare provider. You may need a repeat X-ray or other tests. If X-rays were taken, you will be told of any new findings that may affect your care.  Call 911  Call 911 if you have:  · Neck swelling, difficulty or painful swallowing  · Difficulty breathing  · Chest pain  When to seek medical advice  Call your healthcare provider right away if any of these occur:  · Pain becomes worse or spreads into your arms  · Weakness or numbness in one or both arms  Date Last Reviewed: 11/19/2015  © 7454-8026 [employer]. [address] [place] [address], [place] [address]. All rights reserved. This information is not intended as a substitute for professional medical care. Always follow your healthcare professional's instructions.

## 2020-10-01 ENCOUNTER — OFFICE VISIT (OUTPATIENT)
Dept: FAMILY MEDICINE | Facility: CLINIC | Age: 25
End: 2020-10-01
Payer: COMMERCIAL

## 2020-10-01 ENCOUNTER — TELEPHONE (OUTPATIENT)
Dept: URGENT CARE | Facility: CLINIC | Age: 25
End: 2020-10-01

## 2020-10-01 VITALS
HEIGHT: 69 IN | HEART RATE: 100 BPM | OXYGEN SATURATION: 99 % | BODY MASS INDEX: 34.05 KG/M2 | RESPIRATION RATE: 18 BRPM | SYSTOLIC BLOOD PRESSURE: 120 MMHG | WEIGHT: 229.88 LBS | TEMPERATURE: 98 F | DIASTOLIC BLOOD PRESSURE: 80 MMHG

## 2020-10-01 DIAGNOSIS — S16.1XXA STRAIN OF NECK MUSCLE, INITIAL ENCOUNTER: ICD-10-CM

## 2020-10-01 DIAGNOSIS — G44.209 TENSION HEADACHE: Primary | ICD-10-CM

## 2020-10-01 PROBLEM — M54.2 NECK PAIN: Status: RESOLVED | Noted: 2020-10-01 | Resolved: 2020-10-01

## 2020-10-01 PROBLEM — M54.2 NECK PAIN: Status: ACTIVE | Noted: 2020-10-01

## 2020-10-01 PROCEDURE — 99214 PR OFFICE/OUTPT VISIT, EST, LEVL IV, 30-39 MIN: ICD-10-PCS | Mod: S$GLB,,, | Performed by: FAMILY MEDICINE

## 2020-10-01 PROCEDURE — 99999 PR PBB SHADOW E&M-EST. PATIENT-LVL IV: ICD-10-PCS | Mod: PBBFAC,,, | Performed by: FAMILY MEDICINE

## 2020-10-01 PROCEDURE — 99214 OFFICE O/P EST MOD 30 MIN: CPT | Mod: S$GLB,,, | Performed by: FAMILY MEDICINE

## 2020-10-01 PROCEDURE — 99999 PR PBB SHADOW E&M-EST. PATIENT-LVL IV: CPT | Mod: PBBFAC,,, | Performed by: FAMILY MEDICINE

## 2020-10-01 RX ORDER — PROMETHAZINE HYDROCHLORIDE 25 MG/1
25 TABLET ORAL 3 TIMES DAILY PRN
Qty: 30 TABLET | Refills: 0 | Status: SHIPPED | OUTPATIENT
Start: 2020-10-01 | End: 2021-05-04 | Stop reason: ALTCHOICE

## 2020-10-01 NOTE — PROGRESS NOTES
"  FAMILY MEDICINE  [facility]    Patient Active Problem List   Diagnosis    Chronic pain of right knee    Moderate episode of recurrent major depressive disorder    Generalized anxiety disorder    Lymphadenopathy    Cervical strain    Tension headache       CC:   Chief Complaint   Patient presents with    Headache       SUBJECTIVE:  Thelma Roman   is a 25 y.o. female  - with anxiety, depression, and chronic knee pain presents for urgent care follow-up    She was seen in Urgent Care on 9/28/2020 for neck strain and headache. Covid-19 testing was negative. Cervical Xray was done and normal without any cervical bone abnormalities noted. She was treated with Ketorolac 30 mg IM x1 and dsicharged with naproxen 500 mg BID PRN.    Today she reports headache persisting since onset and no improvement with Ketorlac injection. Currently pain 3/10 but worst at the day goes on to 6/10. Achy and throbbing pain and seem to start in her occipital area with occasionally over her left eye with nausea. At this time just tenderness on her occipital area. No vision changes, weakness, LOC, photophobia or phonophobia    Urgent Care Note:   "Vitals:  height is 5' 9" (1.753 m) and weight is 104.3 kg (230 lb). Her temporal temperature is 98.2 °F (36.8 °C). Her blood pressure is 125/70 and her pulse is 78. Her oxygen saturation is 99%.   Patient was having intercourse on her back and had her head hanging off the bed (upside down).  States she noted intense pain with that position, then dull ache in posterior skull and left side of neck and down to shoulder.  States was intense pain at neck and head with nausea noted during the initial incident.  She placed ice pack on her neck, it improved after a period.  She took ibuprofen 800 mg at 2AM with significant improvement 1 hour after taking the medication.  States it still has lingering pain to head and neck area with TTP of her posterior skull, left side of neck, and down left " "shoulder.    Headache   This is a new problem. The current episode started yesterday. The problem occurs constantly. The problem has been unchanged. The pain is located in the occipital region. The pain radiates to the left neck and right neck. The pain quality is not similar to prior headaches. The quality of the pain is described as throbbing and sharp. The pain is at a severity of 10/10 (currently 5/10). The pain is severe. Associated symptoms include nausea (resolved). Pertinent negatives include no blurred vision, coughing, dizziness, eye pain, fever, loss of balance, neck pain, photophobia, tinnitus, vomiting or weakness. Associated symptoms comments: Patient states blurred vision at time of onset and has dissolved . The symptoms are aggravated by sneezing and coughing. She has tried NSAIDs for the symptoms. The treatment provided no relief. There is no history of migraine headaches or migraines in the family."         ROS: Review of Systems   Constitutional: Negative.    HENT: Negative.    Eyes: Negative.    Respiratory: Negative.    Cardiovascular: Negative.    Gastrointestinal: Negative.    Endocrine: Negative.    Genitourinary: Negative.    Musculoskeletal: Negative.  Negative for neck pain and neck stiffness.   Skin: Negative.    Allergic/Immunologic: Negative.    Neurological: Positive for headaches.        Otherwise negative   Hematological: Negative.    Psychiatric/Behavioral: Negative.        Past Medical History:   Diagnosis Date    Anxiety     Depression     Panic disorder without agoraphobia 9/16/2015       Past Surgical History:   Procedure Laterality Date    CARPAL TUNNEL RELEASE      FACIAL RECONSTRUCTION SURGERY      TONSILLECTOMY         Family History   Problem Relation Age of Onset    No Known Problems Mother     No Known Problems Father        Social History     Tobacco Use    Smoking status: Never Smoker    Smokeless tobacco: Never Used   Substance Use Topics    Alcohol use: No " "    Frequency: Never    Drug use: No       Social History     Social History Narrative    Lives with father. Astranged from her mother. Attends [employer], majoring in psychology with several minors. Works at a furniture sale store.Denies alcohol, smoking or illicit drugs. [employer].        ALLERGIES: Review of patient's allergies indicates:  No Known Allergies    MEDS:   Current Outpatient Medications:     etonogestrel (NEXPLANON) 68 mg Impl subdermal device, by Subdermal route. , Disp: , Rfl:     methocarbamoL (ROBAXIN) 500 MG Tab, Take 1 tablet (500 mg total) by mouth 3 (three) times daily. As needed for muscle spasm. May cause drowsiness for 7 days, Disp: 21 tablet, Rfl: 0    naproxen (NAPROSYN) 500 MG tablet, Take 1 tablet (500 mg total) by mouth 2 (two) times daily with meals. As needed for pain. for 10 days, Disp: 20 tablet, Rfl: 0    sertraline (ZOLOFT) 100 MG tablet, TAKE 1 TABLET BY MOUTH ONCE DAILY WITH 50 MG TABLETS FOR 1 DOSE OF 150MG., Disp: 90 tablet, Rfl: 1    promethazine (PHENERGAN) 25 MG tablet, Take 1 tablet (25 mg total) by mouth 3 (three) times daily as needed for Nausea., Disp: 30 tablet, Rfl: 0    OBJECTIVE:   Vitals:    10/01/20 1151   BP: 120/80   BP Location: Left arm   Patient Position: Sitting   BP Method: X-Large (Manual)   Pulse: 100   Resp: 18   Temp: 98 °F (36.7 °C)   TempSrc: Oral   SpO2: 99%   Weight: 104.3 kg (229 lb 14.4 oz)   Height: 5' 9" (1.753 m)     Body mass index is 33.95 kg/m².    Physical Exam  Constitutional:       General: She is not in acute distress.  HENT:      Head: Normocephalic and atraumatic.        Right Ear: Tympanic membrane and ear canal normal.      Left Ear: Tympanic membrane and ear canal normal.   Neck:      Musculoskeletal: Neck supple. No neck rigidity or muscular tenderness.      Vascular: No carotid bruit.   Cardiovascular:      Rate and Rhythm: Normal rate and regular rhythm.      Pulses: Normal pulses.      Heart sounds: Normal heart sounds. No " murmur. No friction rub. No gallop.    Pulmonary:      Effort: Pulmonary effort is normal.      Breath sounds: Normal breath sounds. No wheezing, rhonchi or rales.   Musculoskeletal:      Right lower leg: No edema.      Left lower leg: No edema.      Comments: Cervical Spine    AROM:   Flexion: normal  Extension: normal  Right side bending: normal  Left side bending: normal  Right rotation: normal  Left rotation: normal    Spurling's test: negative   Lymphadenopathy:      Cervical: No cervical adenopathy.   Skin:     General: Skin is warm.      Capillary Refill: Capillary refill takes less than 2 seconds.   Neurological:      General: No focal deficit present.      Mental Status: She is alert.      Cranial Nerves: No cranial nerve deficit.      Gait: Gait normal.      Deep Tendon Reflexes: Reflexes normal.           PERTINENT RESULTS:   Office Visit on 09/28/2020   Component Date Value Ref Range Status    POC Rapid COVID 09/28/2020 Negative  Negative Final     Acceptable 09/28/2020 Yes   Final     9/28/2020 STAT      Narrative & Impression     EXAMINATION:  XR CERVICAL SPINE 2 OR 3 VIEWS     CLINICAL HISTORY:  Strain of muscle, fascia and tendon at neck level, initial encounter     TECHNIQUE:  AP, lateral, and open mouth views of the cervical spine were performed.     COMPARISON:  None     FINDINGS:  Straightening of the cervical lordosis, likely related to positioning or muscle strain.  Otherwise, normal alignment to the cervical spine.  No fracture or dislocation.  No significant degenerative changes are seen.  Dens and lateral masses appear well aligned and intact.  Vertebral body and intervertebral disc space heights appear maintained.  No prevertebral soft tissue swelling.  No subcutaneous emphysema or radiodense retained foreign body.  Imaged lung apices are clear.     Impression:     No radiographic evidence of cervical spine acute osseous traumatic injury.        Electronically signed by:  Khai Craft MD  Date:                                            09/28/2020  Time:                                           13:05         ASSESSMENT/PLAN:  Problem List Items Addressed This Visit        Neuro    Tension headache - Primary    Overview     - reassurance  - tension headache likely related with cervical strain  - counseling on warm compress, stretches and ROM exercises  - okay for NSAIDs and APAP as needed with promethazine for nausea  - questions elicited and answered  - encouraged to patient to notify me of any questions or concerns         Relevant Medications    promethazine (PHENERGAN) 25 MG tablet       Orthopedic    Cervical strain    Overview     - Xray and urgent care note reviewed  - agree with plan  - only treated 3 days ago and likely needs more time  - reassurance  - supportive care  - questions elicited and answered  - encouraged to patient to notify me of any questions or concerns               ORDERS:   Orders Placed This Encounter    promethazine (PHENERGAN) 25 MG tablet     Vaccines recommended: flu and HPV sereies    Follow-up 2 weeks or sooner if any concerns.    Dr. Amy Molina D.O.   Family Medicine

## 2020-10-08 ENCOUNTER — PATIENT MESSAGE (OUTPATIENT)
Dept: FAMILY MEDICINE | Facility: CLINIC | Age: 25
End: 2020-10-08

## 2020-10-09 ENCOUNTER — TELEPHONE (OUTPATIENT)
Dept: FAMILY MEDICINE | Facility: CLINIC | Age: 25
End: 2020-10-09

## 2020-10-09 RX ORDER — SERTRALINE HYDROCHLORIDE 100 MG/1
TABLET, FILM COATED ORAL
Qty: 30 TABLET | Refills: 3 | Status: SHIPPED | OUTPATIENT
Start: 2020-10-09 | End: 2021-03-08 | Stop reason: SDUPTHER

## 2020-10-09 RX ORDER — SERTRALINE HYDROCHLORIDE 50 MG/1
TABLET, FILM COATED ORAL
Qty: 30 TABLET | Refills: 3 | Status: SHIPPED | OUTPATIENT
Start: 2020-10-09 | End: 2021-03-08 | Stop reason: SDUPTHER

## 2020-10-09 NOTE — TELEPHONE ENCOUNTER
----- Message from Joanna Marie sent at 10/9/2020  9:45 AM CDT -----  Regarding: sertraline (ZOLOFT) 100 MG tablet  Type:  Pharmacy Calling to Clarify an RX    Name of Caller: Cinda Finn  Pharmacy Name: [address]  Prescription Name: sertraline (ZOLOFT) 100 MG tablet  What do they need to clarify?: the correct dosage  Best Call Back Number: [phone]  Additional Information:  please call and give reference/ticket number of [id number]

## 2020-10-30 ENCOUNTER — HOSPITAL ENCOUNTER (INPATIENT)
Facility: OTHER | Age: 25
LOS: 2 days | Discharge: HOME | DRG: 343 | End: 2020-11-01
Attending: SPECIALIST | Admitting: SPECIALIST
Payer: COMMERCIAL

## 2020-10-30 DIAGNOSIS — K35.80 ACUTE APPENDICITIS: ICD-10-CM

## 2020-10-30 PROCEDURE — 11000001 HC ACUTE MED/SURG PRIVATE ROOM

## 2020-10-30 PROCEDURE — 63600175 PHARM REV CODE 636 W HCPCS: Performed by: SPECIALIST

## 2020-10-30 PROCEDURE — 25000003 PHARM REV CODE 250: Performed by: SPECIALIST

## 2020-10-30 RX ORDER — ONDANSETRON 8 MG/1
8 TABLET, ORALLY DISINTEGRATING ORAL EVERY 8 HOURS PRN
Status: DISCONTINUED | OUTPATIENT
Start: 2020-10-30 | End: 2020-11-01 | Stop reason: HOSPADM

## 2020-10-30 RX ORDER — SODIUM CHLORIDE 9 MG/ML
INJECTION, SOLUTION INTRAVENOUS CONTINUOUS
Status: DISCONTINUED | OUTPATIENT
Start: 2020-10-30 | End: 2020-10-31

## 2020-10-30 RX ORDER — HYDROMORPHONE HYDROCHLORIDE 1 MG/ML
0.5 INJECTION, SOLUTION INTRAMUSCULAR; INTRAVENOUS; SUBCUTANEOUS
Status: DISCONTINUED | OUTPATIENT
Start: 2020-10-30 | End: 2020-11-01 | Stop reason: HOSPADM

## 2020-10-30 RX ADMIN — HYDROMORPHONE HYDROCHLORIDE 0.5 MG: 1 INJECTION, SOLUTION INTRAMUSCULAR; INTRAVENOUS; SUBCUTANEOUS at 10:10

## 2020-10-30 RX ADMIN — SODIUM CHLORIDE: 0.9 INJECTION, SOLUTION INTRAVENOUS at 10:10

## 2020-10-31 ENCOUNTER — ANESTHESIA EVENT (OUTPATIENT)
Dept: SURGERY | Facility: OTHER | Age: 25
DRG: 343 | End: 2020-10-31
Payer: COMMERCIAL

## 2020-10-31 ENCOUNTER — ANESTHESIA (OUTPATIENT)
Dept: SURGERY | Facility: OTHER | Age: 25
DRG: 343 | End: 2020-10-31
Payer: COMMERCIAL

## 2020-10-31 PROCEDURE — 25000003 PHARM REV CODE 250: Performed by: SPECIALIST

## 2020-10-31 PROCEDURE — 36000709 HC OR TIME LEV III EA ADD 15 MIN: Performed by: SPECIALIST

## 2020-10-31 PROCEDURE — 94761 N-INVAS EAR/PLS OXIMETRY MLT: CPT

## 2020-10-31 PROCEDURE — 25000003 PHARM REV CODE 250: Performed by: REGISTERED NURSE

## 2020-10-31 PROCEDURE — 11000001 HC ACUTE MED/SURG PRIVATE ROOM

## 2020-10-31 PROCEDURE — 71000033 HC RECOVERY, INTIAL HOUR: Performed by: SPECIALIST

## 2020-10-31 PROCEDURE — 88304 TISSUE EXAM BY PATHOLOGIST: CPT | Mod: 26,,, | Performed by: PATHOLOGY

## 2020-10-31 PROCEDURE — 37000008 HC ANESTHESIA 1ST 15 MINUTES: Performed by: SPECIALIST

## 2020-10-31 PROCEDURE — 88304 PR  SURG PATH,LEVEL III: ICD-10-PCS | Mod: 26,,, | Performed by: PATHOLOGY

## 2020-10-31 PROCEDURE — 37000009 HC ANESTHESIA EA ADD 15 MINS: Performed by: SPECIALIST

## 2020-10-31 PROCEDURE — 36000708 HC OR TIME LEV III 1ST 15 MIN: Performed by: SPECIALIST

## 2020-10-31 PROCEDURE — 63600175 PHARM REV CODE 636 W HCPCS: Performed by: SPECIALIST

## 2020-10-31 PROCEDURE — 88304 TISSUE EXAM BY PATHOLOGIST: CPT | Performed by: PATHOLOGY

## 2020-10-31 PROCEDURE — 27201423 OPTIME MED/SURG SUP & DEVICES STERILE SUPPLY: Performed by: SPECIALIST

## 2020-10-31 PROCEDURE — 63600175 PHARM REV CODE 636 W HCPCS: Performed by: REGISTERED NURSE

## 2020-10-31 PROCEDURE — 27000221 HC OXYGEN, UP TO 24 HOURS

## 2020-10-31 RX ORDER — OXYCODONE HYDROCHLORIDE 5 MG/1
5 TABLET ORAL
Status: DISCONTINUED | OUTPATIENT
Start: 2020-10-31 | End: 2020-10-31

## 2020-10-31 RX ORDER — DEXAMETHASONE SODIUM PHOSPHATE 4 MG/ML
INJECTION, SOLUTION INTRA-ARTICULAR; INTRALESIONAL; INTRAMUSCULAR; INTRAVENOUS; SOFT TISSUE
Status: DISCONTINUED | OUTPATIENT
Start: 2020-10-31 | End: 2020-10-31

## 2020-10-31 RX ORDER — BUPIVACAINE HCL/EPINEPHRINE 0.25-.0005
VIAL (ML) INJECTION
Status: DISCONTINUED | OUTPATIENT
Start: 2020-10-31 | End: 2020-10-31 | Stop reason: HOSPADM

## 2020-10-31 RX ORDER — HYDROMORPHONE HYDROCHLORIDE 2 MG/ML
0.4 INJECTION, SOLUTION INTRAMUSCULAR; INTRAVENOUS; SUBCUTANEOUS EVERY 5 MIN PRN
Status: DISCONTINUED | OUTPATIENT
Start: 2020-10-31 | End: 2020-10-31

## 2020-10-31 RX ORDER — ACETAMINOPHEN 325 MG/1
650 TABLET ORAL EVERY 6 HOURS PRN
Status: DISCONTINUED | OUTPATIENT
Start: 2020-10-31 | End: 2020-11-01 | Stop reason: HOSPADM

## 2020-10-31 RX ORDER — SODIUM CHLORIDE 9 MG/ML
INJECTION, SOLUTION INTRAVENOUS CONTINUOUS
Status: DISCONTINUED | OUTPATIENT
Start: 2020-10-31 | End: 2020-11-01 | Stop reason: HOSPADM

## 2020-10-31 RX ORDER — ACETAMINOPHEN 10 MG/ML
INJECTION, SOLUTION INTRAVENOUS
Status: DISCONTINUED | OUTPATIENT
Start: 2020-10-31 | End: 2020-10-31

## 2020-10-31 RX ORDER — ONDANSETRON 2 MG/ML
INJECTION INTRAMUSCULAR; INTRAVENOUS
Status: DISCONTINUED | OUTPATIENT
Start: 2020-10-31 | End: 2020-10-31

## 2020-10-31 RX ORDER — SODIUM CHLORIDE, SODIUM LACTATE, POTASSIUM CHLORIDE, CALCIUM CHLORIDE 600; 310; 30; 20 MG/100ML; MG/100ML; MG/100ML; MG/100ML
INJECTION, SOLUTION INTRAVENOUS CONTINUOUS PRN
Status: DISCONTINUED | OUTPATIENT
Start: 2020-10-31 | End: 2020-10-31

## 2020-10-31 RX ORDER — MUPIROCIN 20 MG/G
OINTMENT TOPICAL 2 TIMES DAILY
Status: DISCONTINUED | OUTPATIENT
Start: 2020-10-31 | End: 2020-11-01 | Stop reason: HOSPADM

## 2020-10-31 RX ORDER — FENTANYL CITRATE 50 UG/ML
INJECTION, SOLUTION INTRAMUSCULAR; INTRAVENOUS
Status: DISCONTINUED | OUTPATIENT
Start: 2020-10-31 | End: 2020-10-31

## 2020-10-31 RX ORDER — OXYCODONE AND ACETAMINOPHEN 7.5; 325 MG/1; MG/1
1 TABLET ORAL EVERY 4 HOURS PRN
Status: DISCONTINUED | OUTPATIENT
Start: 2020-10-31 | End: 2020-11-01 | Stop reason: HOSPADM

## 2020-10-31 RX ORDER — SODIUM CHLORIDE 0.9 % (FLUSH) 0.9 %
3 SYRINGE (ML) INJECTION
Status: DISCONTINUED | OUTPATIENT
Start: 2020-10-31 | End: 2020-11-01 | Stop reason: HOSPADM

## 2020-10-31 RX ORDER — KETOROLAC TROMETHAMINE 30 MG/ML
30 INJECTION, SOLUTION INTRAMUSCULAR; INTRAVENOUS ONCE
Status: COMPLETED | OUTPATIENT
Start: 2020-10-31 | End: 2020-10-31

## 2020-10-31 RX ORDER — MIDAZOLAM HYDROCHLORIDE 1 MG/ML
INJECTION, SOLUTION INTRAMUSCULAR; INTRAVENOUS
Status: DISCONTINUED | OUTPATIENT
Start: 2020-10-31 | End: 2020-10-31

## 2020-10-31 RX ORDER — ROCURONIUM BROMIDE 10 MG/ML
INJECTION, SOLUTION INTRAVENOUS
Status: DISCONTINUED | OUTPATIENT
Start: 2020-10-31 | End: 2020-10-31

## 2020-10-31 RX ORDER — MEPERIDINE HYDROCHLORIDE 25 MG/ML
12.5 INJECTION INTRAMUSCULAR; INTRAVENOUS; SUBCUTANEOUS ONCE AS NEEDED
Status: DISCONTINUED | OUTPATIENT
Start: 2020-10-31 | End: 2020-10-31

## 2020-10-31 RX ORDER — ONDANSETRON 2 MG/ML
4 INJECTION INTRAMUSCULAR; INTRAVENOUS DAILY PRN
Status: DISCONTINUED | OUTPATIENT
Start: 2020-10-31 | End: 2020-10-31

## 2020-10-31 RX ORDER — LIDOCAINE HCL/PF 100 MG/5ML
SYRINGE (ML) INTRAVENOUS
Status: DISCONTINUED | OUTPATIENT
Start: 2020-10-31 | End: 2020-10-31

## 2020-10-31 RX ORDER — KETAMINE HCL IN 0.9 % NACL 50 MG/5 ML
SYRINGE (ML) INTRAVENOUS
Status: DISCONTINUED | OUTPATIENT
Start: 2020-10-31 | End: 2020-10-31

## 2020-10-31 RX ORDER — PROPOFOL 10 MG/ML
INJECTION, EMULSION INTRAVENOUS
Status: DISCONTINUED | OUTPATIENT
Start: 2020-10-31 | End: 2020-10-31

## 2020-10-31 RX ADMIN — PIPERACILLIN AND TAZOBACTAM 4.5 G: 4; .5 INJECTION, POWDER, LYOPHILIZED, FOR SOLUTION INTRAVENOUS; PARENTERAL at 02:10

## 2020-10-31 RX ADMIN — DEXAMETHASONE SODIUM PHOSPHATE 4 MG: 4 INJECTION, SOLUTION INTRAMUSCULAR; INTRAVENOUS at 09:10

## 2020-10-31 RX ADMIN — PROPOFOL 180 MG: 10 INJECTION, EMULSION INTRAVENOUS at 09:10

## 2020-10-31 RX ADMIN — OXYCODONE AND ACETAMINOPHEN 1 TABLET: 7.5; 325 TABLET ORAL at 09:10

## 2020-10-31 RX ADMIN — SODIUM CHLORIDE: 0.9 INJECTION, SOLUTION INTRAVENOUS at 01:10

## 2020-10-31 RX ADMIN — PIPERACILLIN AND TAZOBACTAM 4.5 G: 4; .5 INJECTION, POWDER, LYOPHILIZED, FOR SOLUTION INTRAVENOUS; PARENTERAL at 03:10

## 2020-10-31 RX ADMIN — PIPERACILLIN AND TAZOBACTAM 4.5 G: 4; .5 INJECTION, POWDER, LYOPHILIZED, FOR SOLUTION INTRAVENOUS; PARENTERAL at 09:10

## 2020-10-31 RX ADMIN — ROCURONIUM BROMIDE 30 MG: 10 INJECTION, SOLUTION INTRAVENOUS at 09:10

## 2020-10-31 RX ADMIN — ONDANSETRON HYDROCHLORIDE 4 MG: 2 INJECTION INTRAMUSCULAR; INTRAVENOUS at 10:10

## 2020-10-31 RX ADMIN — SUGAMMADEX 200 MG: 100 INJECTION, SOLUTION INTRAVENOUS at 10:10

## 2020-10-31 RX ADMIN — PROPOFOL 30 MG: 10 INJECTION, EMULSION INTRAVENOUS at 10:10

## 2020-10-31 RX ADMIN — KETOROLAC TROMETHAMINE 30 MG: 30 INJECTION, SOLUTION INTRAMUSCULAR; INTRAVENOUS at 07:10

## 2020-10-31 RX ADMIN — DEXTROSE 2 G: 50 INJECTION, SOLUTION INTRAVENOUS at 10:10

## 2020-10-31 RX ADMIN — ACETAMINOPHEN 1000 MG: 10 INJECTION, SOLUTION INTRAVENOUS at 09:10

## 2020-10-31 RX ADMIN — SODIUM CHLORIDE, SODIUM LACTATE, POTASSIUM CHLORIDE, AND CALCIUM CHLORIDE: 600; 310; 30; 20 INJECTION, SOLUTION INTRAVENOUS at 09:10

## 2020-10-31 RX ADMIN — HYDROMORPHONE HYDROCHLORIDE 0.5 MG: 1 INJECTION, SOLUTION INTRAMUSCULAR; INTRAVENOUS; SUBCUTANEOUS at 06:10

## 2020-10-31 RX ADMIN — Medication 25 MG: at 09:10

## 2020-10-31 RX ADMIN — MIDAZOLAM 2 MG: 1 INJECTION INTRAMUSCULAR; INTRAVENOUS at 09:10

## 2020-10-31 RX ADMIN — CARBOXYMETHYLCELLULOSE SODIUM 1 DROP: 2.5 SOLUTION/ DROPS OPHTHALMIC at 09:10

## 2020-10-31 RX ADMIN — ONDANSETRON 8 MG: 8 TABLET, ORALLY DISINTEGRATING ORAL at 02:10

## 2020-10-31 RX ADMIN — MINERAL OIL AND WHITE PETROLATUM 1 TUBE: 30; 940 OINTMENT OPHTHALMIC at 09:10

## 2020-10-31 RX ADMIN — SODIUM CHLORIDE: 0.9 INJECTION, SOLUTION INTRAVENOUS at 07:10

## 2020-10-31 RX ADMIN — LIDOCAINE HYDROCHLORIDE 100 MG: 20 INJECTION, SOLUTION INTRAVENOUS at 09:10

## 2020-10-31 RX ADMIN — MUPIROCIN: 20 OINTMENT TOPICAL at 09:10

## 2020-10-31 RX ADMIN — ONDANSETRON 8 MG: 8 TABLET, ORALLY DISINTEGRATING ORAL at 06:10

## 2020-10-31 RX ADMIN — FENTANYL CITRATE 100 MCG: 50 INJECTION, SOLUTION INTRAMUSCULAR; INTRAVENOUS at 09:10

## 2020-10-31 RX ADMIN — OXYCODONE AND ACETAMINOPHEN 1 TABLET: 7.5; 325 TABLET ORAL at 02:10

## 2020-10-31 NOTE — ANESTHESIA POSTPROCEDURE EVALUATION
Anesthesia Post Evaluation    Patient: Thelma Roman    Procedure(s) Performed: Procedure(s) (LRB):  APPENDECTOMY, LAPAROSCOPIC (N/A)    Final Anesthesia Type: general    Patient location during evaluation: PACU  Patient participation: Yes- Able to Participate  Level of consciousness: awake and alert  Post-procedure vital signs: reviewed and stable  Pain management: adequate  Airway patency: patent  JUWAN mitigation strategies: Extubation while patient is awake  PONV status at discharge: No PONV  Anesthetic complications: no      Cardiovascular status: hemodynamically stable  Respiratory status: unassisted  Hydration status: euvolemic  Follow-up not needed.          Vitals Value Taken Time   /61 10/31/20 1131   Temp 36.9 °C (98.4 °F) 10/31/20 1128   Pulse 93 10/31/20 1134   Resp 16 10/31/20 1128   SpO2 93 % 10/31/20 1134   Vitals shown include unvalidated device data.      No case tracking events are documented in the log.      Pain/Tonya Score: Pain Rating Prior to Med Admin: 10 (10/31/2020  7:09 AM)  Pain Rating Post Med Admin: 2 (10/31/2020  7:39 AM)  Tonya Score: 8 (10/31/2020 11:27 AM)

## 2020-10-31 NOTE — ANESTHESIA PROCEDURE NOTES
Intubation  Performed by: Thao Thacker CRNA  Authorized by: Nitin Escobar MD     Intubation:     Induction:  Intravenous    Intubated:  Postinduction    Mask Ventilation:  Easy with oral airway    Attempted By:  CRNA    Method of Intubation:  Video laryngoscopy and direct    Blade:  Tate 3    Laryngeal View Grade: Grade I - full view of chords      Difficult Airway Encountered?: No      Complications:  None    Airway Device:  Oral endotracheal tube    Airway Device Size:  7.5    Style/Cuff Inflation:  Cuffed (inflated to minimal occlusive pressure)    Inflation Amount (mL):  5    Tube secured:  21    Secured at:  The lips    Placement Verified By:  Capnometry    Complicating Factors:  None    Findings Post-Intubation:  BS equal bilateral and atraumatic/condition of teeth unchanged

## 2020-10-31 NOTE — OR NURSING
Pt without change from previous assessment. Continues without c/o pain. Prepared for transfer to inpt room.

## 2020-10-31 NOTE — ANESTHESIA PREPROCEDURE EVALUATION
10/31/2020  Thelma Roman is a 25 y.o., female.    Anesthesia Evaluation    I have reviewed the Patient Summary Reports.    I have reviewed the Nursing Notes. I have reviewed the NPO Status.      Review of Systems  Anesthesia Hx:  No problems with previous Anesthesia    Social:  Non-Smoker    Cardiovascular:   Exercise tolerance: good    Pulmonary:  Pulmonary Normal    Hepatic/GI:  Hepatic/GI Normal    Neurological:   Neuromuscular Disease, Headaches    Endocrine:  Endocrine Normal    Psych:   Psychiatric History anxiety depression          Physical Exam  General:  Obesity    Airway/Jaw/Neck:  Airway Findings: Mouth Opening: Normal Tongue: Normal  General Airway Assessment: Adult  Mallampati: II  TM Distance: Normal, at least 6 cm  Jaw/Neck Findings:     Neck ROM: Normal ROM      Dental:  Dental Findings: In tact             Anesthesia Plan  Type of Anesthesia, risks & benefits discussed:  Anesthesia Type:  general  Patient's Preference:   Intra-op Monitoring Plan:   Intra-op Monitoring Plan Comments:   Post Op Pain Control Plan:   Post Op Pain Control Plan Comments:   Induction:   IV  Beta Blocker:         Informed Consent: Patient understands risks and agrees with Anesthesia plan.  Questions answered. Anesthesia consent signed with patient.  ASA Score: 2  emergent   Day of Surgery Review of History & Physical:    H&P update referred to the surgeon.         Ready For Surgery From Anesthesia Perspective.

## 2020-10-31 NOTE — TRANSFER OF CARE
"Anesthesia Transfer of Care Note    Patient: Thelma Roman    Procedure(s) Performed: Procedure(s) (LRB):  APPENDECTOMY, LAPAROSCOPIC (N/A)    Patient location: PACU    Anesthesia Type: general    Transport from OR: Transported from OR on room air with adequate spontaneous ventilation    Post pain: adequate analgesia    Post assessment: no apparent anesthetic complications    Post vital signs: stable    Level of consciousness: awake    Nausea/Vomiting: no nausea/vomiting    Complications: none    Transfer of care protocol was followed      Last vitals:   Visit Vitals  /63 (BP Location: Right arm, Patient Position: Lying)   Pulse 67   Temp 36.8 °C (98.2 °F) (Oral)   Resp 18   Ht 5' 9" (1.753 m)   Wt 107 kg (236 lb)   SpO2 100%   Breastfeeding No   BMI 34.85 kg/m²     "

## 2020-10-31 NOTE — PLAN OF CARE
VSS and afebrile, aaox4. NPO status maintained. Abx infusing per mar. Pain well controlled. Plan of care reviewed with patient. Purposeful hourly rounding done. Call light at bedside, bed at lowest position, brakes on, non skid socks on. Will continue to monitor.

## 2020-10-31 NOTE — H&P
History & Physical  General Surgery      SUBJECTIVE:     Chief Complaint/Reason for Admission:  Acute Appendicitis    History of Present Illness:  Patient is a 25 y.o. female presents with 1 day history of right-sided abdominal pain.  This was associated with nausea and diarrhea, but no vomiting.  Patient denies urinary or gynecologic symptoms.  The patient was transferred from [facility] for treatment of appendicitis.    PTA Medications   Medication Sig    sertraline (ZOLOFT) 100 MG tablet TAKE 1 TABLET BY MOUTH ONCE DAILY WITH 50 MG TABLET FOR TOTAL DOSE OF 150MG DAILY    sertraline (ZOLOFT) 50 MG tablet TAKE 1 TABLET BY MOUTH ONCE DAILY WITH   MG TABLET FOR TOTAL DOSE OF 150MG DAILY    etonogestrel (NEXPLANON) 68 mg Impl subdermal device by Subdermal route.     promethazine (PHENERGAN) 25 MG tablet Take 1 tablet (25 mg total) by mouth 3 (three) times daily as needed for Nausea.       Review of patient's allergies indicates:  No Known Allergies    Past Medical History:   Diagnosis Date    Anxiety     Depression     Panic disorder without agoraphobia 9/16/2015     Past Surgical History:   Procedure Laterality Date    CARPAL TUNNEL RELEASE      FACIAL RECONSTRUCTION SURGERY      TONSILLECTOMY       Family History   Problem Relation Age of Onset    No Known Problems Mother     No Known Problems Father      Social History     Tobacco Use    Smoking status: Never Smoker    Smokeless tobacco: Never Used   Substance Use Topics    Alcohol use: No     Frequency: Never    Drug use: No        Review of Systems   Constitutional: Negative for chills, diaphoresis and fever.   HENT: Negative.    Respiratory: Negative for shortness of breath and wheezing.    Cardiovascular: Negative for chest pain, palpitations and leg swelling.   Gastrointestinal: Positive for abdominal pain, diarrhea, nausea and vomiting.   Genitourinary: Negative for difficulty urinating, frequency, hematuria and urgency.    Musculoskeletal: Negative.    Skin: Negative for pallor, rash and wound.   Neurological: Negative.    Psychiatric/Behavioral: Negative.      OBJECTIVE:     Vital Signs (Most Recent)  Temp: 98.2 °F (36.8 °C) (10/31/20 0806)  Pulse: 82 (10/31/20 0308)  Resp: 18 (10/31/20 0806)  BP: 111/63 (10/31/20 0806)  SpO2: 100 % (10/31/20 0806)    Physical Exam  Constitutional:       Appearance: She is not ill-appearing or diaphoretic.   HENT:      Head: Normocephalic and atraumatic.   Eyes:      General: No scleral icterus.     Extraocular Movements: Extraocular movements intact.      Conjunctiva/sclera: Conjunctivae normal.   Neck:      Musculoskeletal: Normal range of motion and neck supple. No neck rigidity.   Cardiovascular:      Rate and Rhythm: Normal rate and regular rhythm.      Heart sounds: Normal heart sounds. No murmur. No friction rub. No gallop.    Pulmonary:      Effort: Pulmonary effort is normal.      Breath sounds: Normal breath sounds. No wheezing or rales.   Abdominal:      Palpations: Abdomen is soft.      Tenderness: There is abdominal tenderness. There is guarding. There is no rebound.      Hernia: No hernia is present.   Musculoskeletal: Normal range of motion.         General: No swelling, tenderness or deformity.   Skin:     General: Skin is warm and dry.      Coloration: Skin is not jaundiced.      Findings: No rash.   Neurological:      General: No focal deficit present.      Mental Status: She is alert and oriented to person, place, and time.      Motor: No weakness.      Coordination: Coordination normal.   Psychiatric:         Mood and Affect: Mood normal.         Behavior: Behavior normal.         Thought Content: Thought content normal.         Judgment: Judgment normal.       Laboratory  CBC:   Recent Labs   Lab 10/30/20  1739   WBC 9.65   RBC 4.84   HGB 13.9   HCT 43.3      MCV 90   MCH 28.7   MCHC 32.1     CMP:   Recent Labs   Lab 10/30/20  1739      CALCIUM 10.0   ALBUMIN 4.6    PROT 7.7      K 4.0   CO2 27      BUN 11   CREATININE 0.84   ALKPHOS 82   ALT 12   AST 20   BILITOT 0.4     Recent Labs   Lab 10/30/20  1733   COLORU Yellow   SPECGRAV 1.025   PHUR 7.0   PROTEINUA Negative   NITRITE Negative   LEUKOCYTESUR Negative   UROBILINOGEN Negative       Diagnostic Results:  CT: Reviewed  CT scan shows inflamed appendix with fecalith    ASSESSMENT/PLAN:     Acute appendicitis/admission/appendectomy

## 2020-10-31 NOTE — PROGRESS NOTES
[facility]  Surgery Department  Operative Note    SUMMARY     Patient: Thelma Roman    Medical Record: [id number]    Date of Procedure: 10/31/2020     Surgeon: Surgeon(s) and Role:     * Steve Saucedo Jr., MD - Primary    Assisting Surgeon: None    Pre-Operative Diagnosis: Other acute appendicitis [K35.890]    Post-Operative Diagnosis: Post-Op Diagnosis Codes:     * Other acute appendicitis [K35.890]    Procedure: Procedure(s) (LRB):  APPENDECTOMY, LAPAROSCOPIC (N/A)    Procedure in Detail:  The patient was brought to the operating room and placed in the supine position, the abdomen prepped and draped in a sterile fashion.  A small incision made at the umbilicus, a Veress needle inserted, a pneumoperitoneum achieved.  A 5 mm Optiview trocar was inserted in the right upper quadrant.  Under direct observation 2 additional trocars were inserted:  A 5 mm trocar in the left lower quadrant and a 12 mm trocar at the umbilicus.  The appendix was visualized and seen to be acutely inflamed.  The mesoappendix was taken down using the Harmonic scalpel exposing the appendix at the junction with the cecum.  A linear endoscopic stapler was then placed through the 12 mm port and the appendix transected at its base.  Under direct observation with the aid of an Endo-Catch the appendix was removed through the periumbilical port. Peritoneal cavity irrigated and then inspected.  The pneumoperitoneum released and the fascia the umbilicus closed with 0 Vicryl.  The skin incisions closed with running 4 Monocryl and the wound sterilely dressed.  The patient tolerated the procedure well left the operating room in good condition.  At the end the procedure all sponge lap and instrument counts were correct.  Estimated blood loss- minimal.

## 2020-10-31 NOTE — PLAN OF CARE
Initial Discharge Planning Assessment    Patient is alert and oriented with no communication barriers.    Prior to admission patient was independent with ADLs.   Denies having LW or MPOA.  Patient denies the use of HH or DME    Patients PCP is correct on the face sheet      [facility] pharmacy is     [facility]  [id number]  [place] [id number]  Phone: [phone] Fax: [phone]     Patients family will transport home at discharge.   No CM needs identified at this time.         10/31/20 1442   Discharge Assessment   Assessment Type Discharge Planning Assessment   Confirmed/corrected address and phone number on facesheet? Yes   Assessment information obtained from? Patient   Prior to hospitilization cognitive status: Alert/Oriented   Prior to hospitalization functional status: Independent   Current cognitive status: Alert/Oriented   Current Functional Status: Independent   Lives With friend(s)   Able to Return to Prior Arrangements yes   Is patient able to care for self after discharge? Yes   Readmission Within the Last 30 Days no previous admission in last 30 days   Patient currently being followed by outpatient case management? No   Patient currently receives any other outside agency services? No   Equipment Currently Used at Home none   Do you have any problems affording any of your prescribed medications? No   Is the patient taking medications as prescribed? yes   Does the patient have transportation home? Yes   Transportation Anticipated family or friend will provide   Does the patient receive services at the [facility]? No   Discharge Plan A Home   Discharge Plan B Home with family   DME Needed Upon Discharge  none   Patient/Family in Agreement with Plan yes

## 2020-10-31 NOTE — PLAN OF CARE
Resting comfortably in bed at this time.  VSS on RA and afebrile this shift.  Tolerating pain on PO.  Good appetite and good UOP this shift,  to  bathroom with standby .assist.  Repositions self independently in bed and ambulating around room x1 person assist.   Free from injury or skin breakdown; Fall precautions maintained and call light in reach. SCD's maintained and skid proof socks. Bed alarm in use and patient instructed to call for assistance as needed  POC updated questions answered and comments acknowledged.  Purposeful hourly rounding completed this shift.

## 2020-11-01 VITALS
TEMPERATURE: 98 F | BODY MASS INDEX: 34.96 KG/M2 | SYSTOLIC BLOOD PRESSURE: 114 MMHG | DIASTOLIC BLOOD PRESSURE: 58 MMHG | HEIGHT: 69 IN | OXYGEN SATURATION: 93 % | HEART RATE: 73 BPM | RESPIRATION RATE: 18 BRPM | WEIGHT: 236 LBS

## 2020-11-01 PROCEDURE — 25000003 PHARM REV CODE 250: Performed by: SPECIALIST

## 2020-11-01 PROCEDURE — 94761 N-INVAS EAR/PLS OXIMETRY MLT: CPT

## 2020-11-01 PROCEDURE — 63600175 PHARM REV CODE 636 W HCPCS: Performed by: SPECIALIST

## 2020-11-01 RX ORDER — OXYCODONE AND ACETAMINOPHEN 7.5; 325 MG/1; MG/1
1 TABLET ORAL EVERY 6 HOURS PRN
Qty: 28 TABLET | Refills: 0 | Status: SHIPPED | OUTPATIENT
Start: 2020-11-01 | End: 2021-02-04

## 2020-11-01 RX ADMIN — ONDANSETRON 8 MG: 8 TABLET, ORALLY DISINTEGRATING ORAL at 12:11

## 2020-11-01 RX ADMIN — HYDROMORPHONE HYDROCHLORIDE 0.5 MG: 1 INJECTION, SOLUTION INTRAMUSCULAR; INTRAVENOUS; SUBCUTANEOUS at 12:11

## 2020-11-01 RX ADMIN — PIPERACILLIN AND TAZOBACTAM 4.5 G: 4; .5 INJECTION, POWDER, LYOPHILIZED, FOR SOLUTION INTRAVENOUS; PARENTERAL at 05:11

## 2020-11-01 RX ADMIN — OXYCODONE AND ACETAMINOPHEN 1 TABLET: 7.5; 325 TABLET ORAL at 11:11

## 2020-11-01 RX ADMIN — MUPIROCIN: 20 OINTMENT TOPICAL at 09:11

## 2020-11-01 NOTE — DISCHARGE SUMMARY
Patient is a 25-year-old female who was transferred from [facility] with a history right-sided abdominal pain and a CT scan that showed acute appendicitis with appendicolith.  On physical examination the patient had tenderness and guarding in the right lower quadrant.  She was admitted and taken to surgery where she underwent a laparoscopic appendectomy for acute appendicitis.  She had a benign postoperative course and is being discharged to be followed by me in my office.  She has been instructed as the appropriate limitations of her activity and wound care.  She is on a regular diet at the time of discharge.

## 2020-11-01 NOTE — PLAN OF CARE
Patient is discharged home with self-care.  Patients family will transport home.  No further DC needs from CM perspective       11/01/20 1044   Final Note   Assessment Type Final Discharge Note   Anticipated Discharge Disposition Home   What phone number can be called within the next 1-3 days to see how you are doing after discharge? [phone]   Hospital Follow Up  Appt(s) scheduled? No   Discharge plans and expectations educations in teach back method with documentation complete? Yes   Right Care Referral Info   Post Acute Recommendation No Care   Post-Acute Status   Discharge Delays None known at this time

## 2020-11-01 NOTE — PLAN OF CARE
Patient is awake, alert, and oriented x 4. Patient is ambulatory and up to bathroom. Patient complained of pain during the night and medications were administered per orders. VSS through the shift. SCDs in place. Patient remained free from injury and falls throughout the night. Bed locked in lowest position with side rails up x 2. Call light is within reach of patient. Purposeful rounding maintained throughout shift. Will continue to monitor.

## 2020-11-01 NOTE — OP NOTE
[facility]  Surgery Department  Operative Note    SUMMARY     Patient: Thelma Roman    Medical Record: [id number]    Date of Procedure: 10/31/2020     Surgeon: Surgeon(s) and Role:     * Steve Saucedo Jr., MD - Primary    Assisting Surgeon: None    Pre-Operative Diagnosis: Other acute appendicitis [K35.890]    Post-Operative Diagnosis: Post-Op Diagnosis Codes:     * Other acute appendicitis [K35.890]    Procedure: Procedure(s) (LRB):  APPENDECTOMY, LAPAROSCOPIC (N/A)    Procedure in Detail:  The patient was brought the operating room and placed in supine position, the abdomen prepped and draped in sterile fashion.  Small incision made at the umbilicus, a Veress needle inserted, a pneumoperitoneum achieved.  A 12 mm Optiview trocar was inserted near the umbilicus.  Under direct observation 2 5 mm trocars were inserted:  The 1st in the right upper quadrant, the 2nd in the left lower quadrant.  The appendix was grasped with a ratcheted retractor placed through the right upper quadrant port. Using the LigaSure the mesoappendix was taken down exposing the appendix at its junction with the cecum.  A linear endoscopic stapler was then placed through the 12 mm port and the appendix transected at its base.  Under direct observation with the aid of an Endo-Catch the appendix was removed through the periumbilical port. Peritoneal cavity irrigated and then inspected.  The pneumoperitoneum released.  The fascia umbilicus closed with 0 Vicryl the skin with running 4 Monocryl.  The patient tolerated the procedure well left the operating room in good condition.  At the end the procedure all sponge lap and instrument counts were correct.  Estimated blood loss-minimal

## 2020-11-01 NOTE — PLAN OF CARE
Discharge instructions given written and verbally . All questions asked and answered. Patient ready for discharge. Transportation requested.

## 2020-11-03 ENCOUNTER — PATIENT OUTREACH (OUTPATIENT)
Dept: ADMINISTRATIVE | Facility: CLINIC | Age: 25
End: 2020-11-03

## 2020-11-03 NOTE — PATIENT INSTRUCTIONS
After Laparoscopic Appendectomy (Appendix Removal)  You have had a surgery to remove your appendix. The appendix is a narrow pouch attached to the lower right part of your large intestine. During your surgery, the doctor made 2 to 4 small incisions. One was near your belly button, and the others were elsewhere on your abdomen. Through one incision, the doctor inserted a thin tube with a camera attached (laparoscope). Other surgery tools were used in the other incisions.  While you recover you may have pain in your shoulder and chest for up to 48 hours after surgery. This is common. It is caused by carbon dioxide gas used during the surgery. It will go away.   Home care  · Keep your incisions clean and dry.  · Don't pull off the thin strips of tape covering your incision. They should fall off on their own in a week or so.  · Wear loose-fitting clothes. This will help cause less irritation around your incisions.  · You can shower as usual. Gently wash around your incisions with soap and water. Dont take a bath until your incisions are fully healed.  · Dont drive until you have stopped taken prescription pain medicine.  · Dont lift anything heavier than 10 pounds until your healthcare provider says its OK.  · Limit sports and strenuous activities for 1 or 2 weeks.  · Resume light activities around your home as soon as you feel comfortable.  What to eat  Eat a bland, low-fat diet. This can include foods such as:  · Well-cooked soft cereals  · Mashed potatoes  · Plain toast or bread  · Plain crackers  · Plain pasta  · Rice  · Cottage cheese  · Pudding  · Low-fat yogurt  · Low-fat milk  · Ripe bananas  Drink 6 to 8 glasses of water a day, unless directed otherwise. If you are constipated, take a fiber laxative or a stool softener.  When to call your healthcare provider   Call your healthcare provider right away if you have any of the following:  · Swelling, pain, fluid, or redness in the incision that gets  worse  · Fever of 100.4°F (38°C) or higher, or as directed by your healthcare provider  · Belly (abdominal) pain that gets worse  · Severe diarrhea, bloating, or constipation  · Nausea or vomiting   Date Last Reviewed: 10/1/2016  © 2401-1797 [employer]. [address] [place] [address]. All rights reserved. This information is not intended as a substitute for professional medical care. Always follow your healthcare professional's instructions.

## 2020-11-04 LAB
FINAL PATHOLOGIC DIAGNOSIS: NORMAL
GROSS: NORMAL
Lab: NORMAL

## 2021-02-04 PROBLEM — E66.09 CLASS 1 OBESITY DUE TO EXCESS CALORIES WITHOUT SERIOUS COMORBIDITY WITH BODY MASS INDEX (BMI) OF 33.0 TO 33.9 IN ADULT: Status: ACTIVE | Noted: 2021-02-04

## 2021-02-04 PROBLEM — E66.811 CLASS 1 OBESITY DUE TO EXCESS CALORIES WITHOUT SERIOUS COMORBIDITY WITH BODY MASS INDEX (BMI) OF 33.0 TO 33.9 IN ADULT: Status: ACTIVE | Noted: 2021-02-04

## 2021-02-04 PROBLEM — K35.80 ACUTE APPENDICITIS: Status: RESOLVED | Noted: 2020-10-30 | Resolved: 2021-02-04

## 2021-03-08 ENCOUNTER — TELEPHONE (OUTPATIENT)
Dept: FAMILY MEDICINE | Facility: CLINIC | Age: 26
End: 2021-03-08

## 2021-03-08 DIAGNOSIS — F41.1 GENERALIZED ANXIETY DISORDER: ICD-10-CM

## 2021-03-08 DIAGNOSIS — F33.1 MODERATE EPISODE OF RECURRENT MAJOR DEPRESSIVE DISORDER: ICD-10-CM

## 2021-03-08 DIAGNOSIS — G44.209 TENSION HEADACHE: Primary | ICD-10-CM

## 2021-03-08 RX ORDER — SERTRALINE HYDROCHLORIDE 50 MG/1
TABLET, FILM COATED ORAL
Qty: 90 TABLET | Refills: 1 | Status: SHIPPED | OUTPATIENT
Start: 2021-03-08 | End: 2021-05-04 | Stop reason: ALTCHOICE

## 2021-03-08 RX ORDER — SERTRALINE HYDROCHLORIDE 100 MG/1
TABLET, FILM COATED ORAL
Qty: 90 TABLET | Refills: 1 | Status: SHIPPED | OUTPATIENT
Start: 2021-03-08 | End: 2021-05-06

## 2021-03-11 ENCOUNTER — PATIENT MESSAGE (OUTPATIENT)
Dept: FAMILY MEDICINE | Facility: CLINIC | Age: 26
End: 2021-03-11

## 2021-03-14 ENCOUNTER — IMMUNIZATION (OUTPATIENT)
Dept: INTERNAL MEDICINE | Facility: CLINIC | Age: 26
End: 2021-03-14
Payer: COMMERCIAL

## 2021-03-14 DIAGNOSIS — Z23 NEED FOR VACCINATION: Primary | ICD-10-CM

## 2021-03-14 PROCEDURE — 0031A COVID-19,VECTOR-NR,RS-AD26,PF,0.5 ML DOSE VACCINE (JANSSEN): CPT | Mod: PBBFAC | Performed by: FAMILY MEDICINE

## 2021-03-15 ENCOUNTER — PATIENT MESSAGE (OUTPATIENT)
Dept: FAMILY MEDICINE | Facility: CLINIC | Age: 26
End: 2021-03-15

## 2021-05-01 ENCOUNTER — PATIENT MESSAGE (OUTPATIENT)
Dept: FAMILY MEDICINE | Facility: CLINIC | Age: 26
End: 2021-05-01

## 2021-05-04 ENCOUNTER — OFFICE VISIT (OUTPATIENT)
Dept: FAMILY MEDICINE | Facility: CLINIC | Age: 26
End: 2021-05-04
Payer: COMMERCIAL

## 2021-05-04 VITALS
BODY MASS INDEX: 34.07 KG/M2 | WEIGHT: 230 LBS | OXYGEN SATURATION: 98 % | TEMPERATURE: 98 F | RESPIRATION RATE: 18 BRPM | HEART RATE: 100 BPM | HEIGHT: 69 IN | SYSTOLIC BLOOD PRESSURE: 122 MMHG | DIASTOLIC BLOOD PRESSURE: 74 MMHG

## 2021-05-04 DIAGNOSIS — Z00.00 GENERAL MEDICAL EXAM: Primary | ICD-10-CM

## 2021-05-04 DIAGNOSIS — F41.1 GENERALIZED ANXIETY DISORDER: ICD-10-CM

## 2021-05-04 PROCEDURE — 99999 PR PBB SHADOW E&M-EST. PATIENT-LVL IV: ICD-10-PCS | Mod: PBBFAC,,, | Performed by: FAMILY MEDICINE

## 2021-05-04 PROCEDURE — 99999 PR PBB SHADOW E&M-EST. PATIENT-LVL IV: CPT | Mod: PBBFAC,,, | Performed by: FAMILY MEDICINE

## 2021-05-04 PROCEDURE — 99214 OFFICE O/P EST MOD 30 MIN: CPT | Mod: S$GLB,,, | Performed by: FAMILY MEDICINE

## 2021-05-04 PROCEDURE — 99214 PR OFFICE/OUTPT VISIT, EST, LEVL IV, 30-39 MIN: ICD-10-PCS | Mod: S$GLB,,, | Performed by: FAMILY MEDICINE

## 2021-05-04 RX ORDER — PHENTERMINE HYDROCHLORIDE 37.5 MG/1
37.5 TABLET ORAL
Qty: 30 TABLET | Refills: 0 | Status: SHIPPED | OUTPATIENT
Start: 2021-05-04 | End: 2021-05-27

## 2021-05-04 RX ORDER — PHENTERMINE HYDROCHLORIDE 37.5 MG/1
37.5 TABLET ORAL
Qty: 30 TABLET | Refills: 0 | Status: SHIPPED | OUTPATIENT
Start: 2021-05-04 | End: 2021-05-04

## 2021-05-06 ENCOUNTER — PATIENT MESSAGE (OUTPATIENT)
Dept: PSYCHIATRY | Facility: CLINIC | Age: 26
End: 2021-05-06

## 2021-05-06 ENCOUNTER — OFFICE VISIT (OUTPATIENT)
Dept: PSYCHIATRY | Facility: CLINIC | Age: 26
End: 2021-05-06
Payer: COMMERCIAL

## 2021-05-06 DIAGNOSIS — F41.1 GENERALIZED ANXIETY DISORDER WITH PANIC ATTACKS: ICD-10-CM

## 2021-05-06 DIAGNOSIS — F43.10 PTSD (POST-TRAUMATIC STRESS DISORDER): Primary | ICD-10-CM

## 2021-05-06 DIAGNOSIS — F32.1 CURRENT MODERATE EPISODE OF MAJOR DEPRESSIVE DISORDER, UNSPECIFIED WHETHER RECURRENT: ICD-10-CM

## 2021-05-06 DIAGNOSIS — F41.0 GENERALIZED ANXIETY DISORDER WITH PANIC ATTACKS: ICD-10-CM

## 2021-05-06 PROCEDURE — 99203 PR OFFICE/OUTPT VISIT, NEW, LEVL III, 30-44 MIN: ICD-10-PCS | Mod: S$GLB,,,

## 2021-05-06 PROCEDURE — 99999 PR PBB SHADOW E&M-EST. PATIENT-LVL II: CPT | Mod: PBBFAC,,,

## 2021-05-06 PROCEDURE — 99203 OFFICE O/P NEW LOW 30 MIN: CPT | Mod: S$GLB,,,

## 2021-05-06 PROCEDURE — 99999 PR PBB SHADOW E&M-EST. PATIENT-LVL II: ICD-10-PCS | Mod: PBBFAC,,,

## 2021-05-06 RX ORDER — PRAZOSIN HYDROCHLORIDE 1 MG/1
1 CAPSULE ORAL NIGHTLY
Qty: 30 CAPSULE | Refills: 11 | Status: SHIPPED | OUTPATIENT
Start: 2021-05-06 | End: 2021-09-30

## 2021-05-06 RX ORDER — BUPROPION HYDROCHLORIDE 75 MG/1
75 TABLET ORAL DAILY
Qty: 30 TABLET | Refills: 11 | Status: SHIPPED | OUTPATIENT
Start: 2021-05-06 | End: 2021-05-28

## 2021-05-09 ENCOUNTER — PATIENT MESSAGE (OUTPATIENT)
Dept: FAMILY MEDICINE | Facility: CLINIC | Age: 26
End: 2021-05-09

## 2021-05-11 ENCOUNTER — PATIENT MESSAGE (OUTPATIENT)
Dept: PSYCHIATRY | Facility: CLINIC | Age: 26
End: 2021-05-11

## 2021-05-13 ENCOUNTER — PATIENT MESSAGE (OUTPATIENT)
Dept: FAMILY MEDICINE | Facility: CLINIC | Age: 26
End: 2021-05-13

## 2021-05-27 ENCOUNTER — OFFICE VISIT (OUTPATIENT)
Dept: FAMILY MEDICINE | Facility: CLINIC | Age: 26
End: 2021-05-27
Payer: COMMERCIAL

## 2021-05-27 DIAGNOSIS — E66.9 OBESITY (BMI 30.0-34.9): Primary | ICD-10-CM

## 2021-05-27 DIAGNOSIS — F41.1 GENERALIZED ANXIETY DISORDER: ICD-10-CM

## 2021-05-27 PROCEDURE — 99214 OFFICE O/P EST MOD 30 MIN: CPT | Mod: 95,,, | Performed by: FAMILY MEDICINE

## 2021-05-27 PROCEDURE — 99214 PR OFFICE/OUTPT VISIT, EST, LEVL IV, 30-39 MIN: ICD-10-PCS | Mod: 95,,, | Performed by: FAMILY MEDICINE

## 2021-05-27 RX ORDER — BENZPHETAMINE HYDROCHLORIDE 50 MG/1
TABLET ORAL
Qty: 30 EACH | Refills: 0 | Status: SHIPPED | OUTPATIENT
Start: 2021-05-27 | End: 2021-09-30 | Stop reason: ALTCHOICE

## 2021-05-28 ENCOUNTER — OFFICE VISIT (OUTPATIENT)
Dept: PSYCHIATRY | Facility: CLINIC | Age: 26
End: 2021-05-28
Payer: COMMERCIAL

## 2021-05-28 VITALS
DIASTOLIC BLOOD PRESSURE: 88 MMHG | BODY MASS INDEX: 33.99 KG/M2 | HEART RATE: 105 BPM | WEIGHT: 230.19 LBS | SYSTOLIC BLOOD PRESSURE: 132 MMHG

## 2021-05-28 DIAGNOSIS — F41.1 GENERALIZED ANXIETY DISORDER WITH PANIC ATTACKS: ICD-10-CM

## 2021-05-28 DIAGNOSIS — F32.1 CURRENT MODERATE EPISODE OF MAJOR DEPRESSIVE DISORDER, UNSPECIFIED WHETHER RECURRENT: ICD-10-CM

## 2021-05-28 DIAGNOSIS — F41.0 GENERALIZED ANXIETY DISORDER WITH PANIC ATTACKS: ICD-10-CM

## 2021-05-28 DIAGNOSIS — F43.10 PTSD (POST-TRAUMATIC STRESS DISORDER): Primary | ICD-10-CM

## 2021-05-28 PROCEDURE — 99213 OFFICE O/P EST LOW 20 MIN: CPT | Mod: S$GLB,,,

## 2021-05-28 PROCEDURE — 99999 PR PBB SHADOW E&M-EST. PATIENT-LVL II: ICD-10-PCS | Mod: PBBFAC,,,

## 2021-05-28 PROCEDURE — 99213 PR OFFICE/OUTPT VISIT, EST, LEVL III, 20-29 MIN: ICD-10-PCS | Mod: S$GLB,,,

## 2021-05-28 PROCEDURE — 99999 PR PBB SHADOW E&M-EST. PATIENT-LVL II: CPT | Mod: PBBFAC,,,

## 2021-05-28 RX ORDER — SERTRALINE HYDROCHLORIDE 50 MG/1
50 TABLET, FILM COATED ORAL DAILY
Qty: 30 TABLET | Refills: 11 | Status: SHIPPED | OUTPATIENT
Start: 2021-05-28 | End: 2021-09-30 | Stop reason: ALTCHOICE

## 2021-05-28 RX ORDER — BUPROPION HYDROCHLORIDE 150 MG/1
150 TABLET ORAL DAILY
Qty: 30 TABLET | Refills: 11 | Status: SHIPPED | OUTPATIENT
Start: 2021-05-28 | End: 2021-10-29 | Stop reason: SDUPTHER

## 2021-06-25 ENCOUNTER — PATIENT MESSAGE (OUTPATIENT)
Dept: PSYCHIATRY | Facility: CLINIC | Age: 26
End: 2021-06-25

## 2021-07-21 ENCOUNTER — PATIENT MESSAGE (OUTPATIENT)
Dept: FAMILY MEDICINE | Facility: CLINIC | Age: 26
End: 2021-07-21

## 2021-07-28 DIAGNOSIS — E66.9 OBESITY (BMI 30.0-34.9): ICD-10-CM

## 2021-07-28 RX ORDER — BENZPHETAMINE HYDROCHLORIDE 50 MG/1
TABLET ORAL
Qty: 30 EACH | Refills: 0 | OUTPATIENT
Start: 2021-07-28

## 2021-08-21 ENCOUNTER — OFFICE VISIT (OUTPATIENT)
Dept: URGENT CARE | Facility: CLINIC | Age: 26
End: 2021-08-21
Payer: COMMERCIAL

## 2021-08-21 VITALS
WEIGHT: 230 LBS | DIASTOLIC BLOOD PRESSURE: 82 MMHG | BODY MASS INDEX: 34.07 KG/M2 | TEMPERATURE: 99 F | RESPIRATION RATE: 20 BRPM | HEART RATE: 93 BPM | SYSTOLIC BLOOD PRESSURE: 147 MMHG | HEIGHT: 69 IN | OXYGEN SATURATION: 97 %

## 2021-08-21 DIAGNOSIS — Z20.822 COVID-19 RULED OUT: ICD-10-CM

## 2021-08-21 DIAGNOSIS — B34.9 VIRAL SYNDROME: Primary | ICD-10-CM

## 2021-08-21 DIAGNOSIS — R05.9 COUGH: ICD-10-CM

## 2021-08-21 DIAGNOSIS — R53.83 FATIGUE, UNSPECIFIED TYPE: ICD-10-CM

## 2021-08-21 DIAGNOSIS — R11.0 NAUSEA: ICD-10-CM

## 2021-08-21 LAB
CTP QC/QA: YES
SARS-COV-2 RDRP RESP QL NAA+PROBE: NEGATIVE

## 2021-08-21 PROCEDURE — 99214 OFFICE O/P EST MOD 30 MIN: CPT | Mod: S$GLB,,, | Performed by: NURSE PRACTITIONER

## 2021-08-21 PROCEDURE — U0002 COVID-19 LAB TEST NON-CDC: HCPCS | Mod: QW,S$GLB,, | Performed by: NURSE PRACTITIONER

## 2021-08-21 PROCEDURE — U0002: ICD-10-PCS | Mod: QW,S$GLB,, | Performed by: NURSE PRACTITIONER

## 2021-08-21 PROCEDURE — 99214 PR OFFICE/OUTPT VISIT, EST, LEVL IV, 30-39 MIN: ICD-10-PCS | Mod: S$GLB,,, | Performed by: NURSE PRACTITIONER

## 2021-08-21 RX ORDER — ONDANSETRON 4 MG/1
4 TABLET, ORALLY DISINTEGRATING ORAL 2 TIMES DAILY PRN
Qty: 20 TABLET | Refills: 0 | Status: SHIPPED | OUTPATIENT
Start: 2021-08-21 | End: 2021-09-30

## 2021-09-29 ENCOUNTER — PATIENT MESSAGE (OUTPATIENT)
Dept: FAMILY MEDICINE | Facility: CLINIC | Age: 26
End: 2021-09-29

## 2021-09-29 PROBLEM — Z00.01 ENCOUNTER FOR GENERAL ADULT MEDICAL EXAMINATION WITH ABNORMAL FINDINGS: Status: ACTIVE | Noted: 2021-09-29

## 2021-09-29 PROBLEM — S16.1XXA CERVICAL STRAIN: Status: RESOLVED | Noted: 2020-10-01 | Resolved: 2021-09-29

## 2021-09-30 ENCOUNTER — OFFICE VISIT (OUTPATIENT)
Dept: FAMILY MEDICINE | Facility: CLINIC | Age: 26
End: 2021-09-30
Payer: MEDICAID

## 2021-09-30 ENCOUNTER — PATIENT MESSAGE (OUTPATIENT)
Dept: FAMILY MEDICINE | Facility: CLINIC | Age: 26
End: 2021-09-30

## 2021-09-30 VITALS
BODY MASS INDEX: 34.07 KG/M2 | OXYGEN SATURATION: 99 % | WEIGHT: 230 LBS | TEMPERATURE: 97 F | SYSTOLIC BLOOD PRESSURE: 124 MMHG | HEART RATE: 89 BPM | DIASTOLIC BLOOD PRESSURE: 76 MMHG | RESPIRATION RATE: 18 BRPM | HEIGHT: 69 IN

## 2021-09-30 DIAGNOSIS — E66.09 CLASS 1 OBESITY DUE TO EXCESS CALORIES WITHOUT SERIOUS COMORBIDITY WITH BODY MASS INDEX (BMI) OF 33.0 TO 33.9 IN ADULT: ICD-10-CM

## 2021-09-30 DIAGNOSIS — Z11.1 SCREENING-PULMONARY TB: ICD-10-CM

## 2021-09-30 DIAGNOSIS — F41.1 GENERALIZED ANXIETY DISORDER: ICD-10-CM

## 2021-09-30 DIAGNOSIS — F33.1 MODERATE EPISODE OF RECURRENT MAJOR DEPRESSIVE DISORDER: ICD-10-CM

## 2021-09-30 DIAGNOSIS — Z00.01 ENCOUNTER FOR GENERAL ADULT MEDICAL EXAMINATION WITH ABNORMAL FINDINGS: Primary | ICD-10-CM

## 2021-09-30 PROBLEM — R59.1 LYMPHADENOPATHY: Status: RESOLVED | Noted: 2019-10-21 | Resolved: 2021-09-30

## 2021-09-30 PROBLEM — G89.29 CHRONIC PAIN OF RIGHT KNEE: Status: RESOLVED | Noted: 2018-11-18 | Resolved: 2021-09-30

## 2021-09-30 PROBLEM — M25.561 CHRONIC PAIN OF RIGHT KNEE: Status: RESOLVED | Noted: 2018-11-18 | Resolved: 2021-09-30

## 2021-09-30 PROCEDURE — 99213 OFFICE O/P EST LOW 20 MIN: CPT | Mod: PBBFAC,PN | Performed by: FAMILY MEDICINE

## 2021-09-30 PROCEDURE — 99395 PREV VISIT EST AGE 18-39: CPT | Mod: S$PBB,,, | Performed by: FAMILY MEDICINE

## 2021-09-30 PROCEDURE — 99395 PR PREVENTIVE VISIT,EST,18-39: ICD-10-PCS | Mod: S$PBB,,, | Performed by: FAMILY MEDICINE

## 2021-09-30 PROCEDURE — 90686 IIV4 VACC NO PRSV 0.5 ML IM: CPT | Mod: PBBFAC,PN

## 2021-09-30 PROCEDURE — 99999 PR PBB SHADOW E&M-EST. PATIENT-LVL III: CPT | Mod: PBBFAC,,, | Performed by: FAMILY MEDICINE

## 2021-09-30 PROCEDURE — 99999 PR PBB SHADOW E&M-EST. PATIENT-LVL III: ICD-10-PCS | Mod: PBBFAC,,, | Performed by: FAMILY MEDICINE

## 2021-10-04 ENCOUNTER — PATIENT MESSAGE (OUTPATIENT)
Dept: FAMILY MEDICINE | Facility: CLINIC | Age: 26
End: 2021-10-04

## 2021-10-06 ENCOUNTER — TELEPHONE (OUTPATIENT)
Dept: FAMILY MEDICINE | Facility: CLINIC | Age: 26
End: 2021-10-06

## 2021-10-29 ENCOUNTER — OFFICE VISIT (OUTPATIENT)
Dept: PSYCHIATRY | Facility: CLINIC | Age: 26
End: 2021-10-29
Payer: COMMERCIAL

## 2021-10-29 VITALS
SYSTOLIC BLOOD PRESSURE: 134 MMHG | DIASTOLIC BLOOD PRESSURE: 78 MMHG | BODY MASS INDEX: 33.58 KG/M2 | WEIGHT: 227.38 LBS | HEART RATE: 100 BPM

## 2021-10-29 DIAGNOSIS — F41.1 GENERALIZED ANXIETY DISORDER WITH PANIC ATTACKS: ICD-10-CM

## 2021-10-29 DIAGNOSIS — F41.0 GENERALIZED ANXIETY DISORDER WITH PANIC ATTACKS: ICD-10-CM

## 2021-10-29 DIAGNOSIS — F43.10 PTSD (POST-TRAUMATIC STRESS DISORDER): Primary | ICD-10-CM

## 2021-10-29 DIAGNOSIS — F32.1 CURRENT MODERATE EPISODE OF MAJOR DEPRESSIVE DISORDER, UNSPECIFIED WHETHER RECURRENT: ICD-10-CM

## 2021-10-29 PROCEDURE — 99999 PR PBB SHADOW E&M-EST. PATIENT-LVL III: ICD-10-PCS | Mod: PBBFAC,,, | Performed by: STUDENT IN AN ORGANIZED HEALTH CARE EDUCATION/TRAINING PROGRAM

## 2021-10-29 PROCEDURE — 99214 OFFICE O/P EST MOD 30 MIN: CPT | Mod: S$PBB,,, | Performed by: STUDENT IN AN ORGANIZED HEALTH CARE EDUCATION/TRAINING PROGRAM

## 2021-10-29 PROCEDURE — 99999 PR PBB SHADOW E&M-EST. PATIENT-LVL III: CPT | Mod: PBBFAC,,, | Performed by: STUDENT IN AN ORGANIZED HEALTH CARE EDUCATION/TRAINING PROGRAM

## 2021-10-29 PROCEDURE — 99214 PR OFFICE/OUTPT VISIT, EST, LEVL IV, 30-39 MIN: ICD-10-PCS | Mod: S$PBB,,, | Performed by: STUDENT IN AN ORGANIZED HEALTH CARE EDUCATION/TRAINING PROGRAM

## 2021-10-29 RX ORDER — BUPROPION HYDROCHLORIDE 150 MG/1
150 TABLET ORAL DAILY
Qty: 30 TABLET | Refills: 2 | Status: SHIPPED | OUTPATIENT
Start: 2021-10-29 | End: 2022-02-17 | Stop reason: SDUPTHER

## 2021-10-29 RX ORDER — VENLAFAXINE HYDROCHLORIDE 37.5 MG/1
37.5 CAPSULE, EXTENDED RELEASE ORAL DAILY
Qty: 30 CAPSULE | Refills: 1 | Status: SHIPPED | OUTPATIENT
Start: 2021-10-29 | End: 2021-11-12

## 2021-11-12 ENCOUNTER — OFFICE VISIT (OUTPATIENT)
Dept: PSYCHIATRY | Facility: CLINIC | Age: 26
End: 2021-11-12
Payer: MEDICAID

## 2021-11-12 DIAGNOSIS — F32.1 CURRENT MODERATE EPISODE OF MAJOR DEPRESSIVE DISORDER, UNSPECIFIED WHETHER RECURRENT: ICD-10-CM

## 2021-11-12 DIAGNOSIS — F43.10 PTSD (POST-TRAUMATIC STRESS DISORDER): ICD-10-CM

## 2021-11-12 DIAGNOSIS — F41.1 GENERALIZED ANXIETY DISORDER: ICD-10-CM

## 2021-11-12 DIAGNOSIS — F33.1 MODERATE EPISODE OF RECURRENT MAJOR DEPRESSIVE DISORDER: Primary | ICD-10-CM

## 2021-11-12 DIAGNOSIS — F41.1 GENERALIZED ANXIETY DISORDER WITH PANIC ATTACKS: ICD-10-CM

## 2021-11-12 DIAGNOSIS — F41.0 GENERALIZED ANXIETY DISORDER WITH PANIC ATTACKS: ICD-10-CM

## 2021-11-12 PROCEDURE — 99214 OFFICE O/P EST MOD 30 MIN: CPT | Mod: AF,HB,95, | Performed by: STUDENT IN AN ORGANIZED HEALTH CARE EDUCATION/TRAINING PROGRAM

## 2021-11-12 PROCEDURE — 99214 PR OFFICE/OUTPT VISIT, EST, LEVL IV, 30-39 MIN: ICD-10-PCS | Mod: AF,HB,95, | Performed by: STUDENT IN AN ORGANIZED HEALTH CARE EDUCATION/TRAINING PROGRAM

## 2021-11-12 RX ORDER — VENLAFAXINE HYDROCHLORIDE 75 MG/1
75 CAPSULE, EXTENDED RELEASE ORAL DAILY
Qty: 30 CAPSULE | Refills: 1 | Status: SHIPPED | OUTPATIENT
Start: 2021-11-12 | End: 2021-12-09 | Stop reason: SDUPTHER

## 2021-11-20 ENCOUNTER — PATIENT MESSAGE (OUTPATIENT)
Dept: PSYCHIATRY | Facility: CLINIC | Age: 26
End: 2021-11-20
Payer: MEDICAID

## 2021-11-23 ENCOUNTER — PATIENT MESSAGE (OUTPATIENT)
Dept: PSYCHIATRY | Facility: CLINIC | Age: 26
End: 2021-11-23
Payer: MEDICAID

## 2021-11-30 ENCOUNTER — OFFICE VISIT (OUTPATIENT)
Dept: URGENT CARE | Facility: CLINIC | Age: 26
End: 2021-11-30
Payer: MEDICAID

## 2021-11-30 VITALS
WEIGHT: 227 LBS | SYSTOLIC BLOOD PRESSURE: 130 MMHG | BODY MASS INDEX: 33.62 KG/M2 | HEART RATE: 88 BPM | DIASTOLIC BLOOD PRESSURE: 91 MMHG | HEIGHT: 69 IN | OXYGEN SATURATION: 96 % | RESPIRATION RATE: 16 BRPM | TEMPERATURE: 98 F

## 2021-11-30 DIAGNOSIS — S69.91XA FINGER INJURY, RIGHT, INITIAL ENCOUNTER: Primary | ICD-10-CM

## 2021-11-30 DIAGNOSIS — S60.011A CONTUSION OF RIGHT THUMB WITHOUT DAMAGE TO NAIL, INITIAL ENCOUNTER: ICD-10-CM

## 2021-11-30 PROCEDURE — 73140 XR FINGER 2 OR MORE VIEWS RIGHT: ICD-10-PCS | Mod: FY,RT,S$GLB, | Performed by: RADIOLOGY

## 2021-11-30 PROCEDURE — 73140 X-RAY EXAM OF FINGER(S): CPT | Mod: FY,RT,S$GLB, | Performed by: RADIOLOGY

## 2021-11-30 PROCEDURE — 99213 PR OFFICE/OUTPT VISIT, EST, LEVL III, 20-29 MIN: ICD-10-PCS | Mod: S$GLB,,, | Performed by: PHYSICIAN ASSISTANT

## 2021-11-30 PROCEDURE — 99213 OFFICE O/P EST LOW 20 MIN: CPT | Mod: S$GLB,,, | Performed by: PHYSICIAN ASSISTANT

## 2021-12-08 ENCOUNTER — PATIENT MESSAGE (OUTPATIENT)
Dept: PSYCHIATRY | Facility: CLINIC | Age: 26
End: 2021-12-08
Payer: MEDICAID

## 2021-12-08 DIAGNOSIS — F32.1 CURRENT MODERATE EPISODE OF MAJOR DEPRESSIVE DISORDER, UNSPECIFIED WHETHER RECURRENT: ICD-10-CM

## 2021-12-08 DIAGNOSIS — F41.1 GENERALIZED ANXIETY DISORDER WITH PANIC ATTACKS: ICD-10-CM

## 2021-12-08 DIAGNOSIS — F41.0 GENERALIZED ANXIETY DISORDER WITH PANIC ATTACKS: ICD-10-CM

## 2021-12-09 RX ORDER — VENLAFAXINE HYDROCHLORIDE 75 MG/1
75 CAPSULE, EXTENDED RELEASE ORAL DAILY
Qty: 30 CAPSULE | Refills: 1 | Status: SHIPPED | OUTPATIENT
Start: 2021-12-09 | End: 2022-02-10 | Stop reason: SDUPTHER

## 2022-01-03 PROBLEM — Z00.01 ENCOUNTER FOR GENERAL ADULT MEDICAL EXAMINATION WITH ABNORMAL FINDINGS: Status: RESOLVED | Noted: 2021-09-29 | Resolved: 2022-01-03

## 2022-01-05 ENCOUNTER — PATIENT MESSAGE (OUTPATIENT)
Dept: PSYCHIATRY | Facility: CLINIC | Age: 27
End: 2022-01-05
Payer: MEDICAID

## 2022-01-10 LAB
B-HCG UR QL: NEGATIVE
CTP QC/QA: YES

## 2022-01-10 PROCEDURE — 99284 EMERGENCY DEPT VISIT MOD MDM: CPT | Mod: 25

## 2022-01-10 PROCEDURE — 81025 URINE PREGNANCY TEST: CPT | Performed by: NURSE PRACTITIONER

## 2022-01-11 ENCOUNTER — HOSPITAL ENCOUNTER (EMERGENCY)
Facility: HOSPITAL | Age: 27
Discharge: HOME | End: 2022-01-11
Attending: EMERGENCY MEDICINE
Payer: MEDICAID

## 2022-01-11 VITALS
DIASTOLIC BLOOD PRESSURE: 82 MMHG | TEMPERATURE: 98 F | OXYGEN SATURATION: 98 % | WEIGHT: 217 LBS | RESPIRATION RATE: 16 BRPM | BODY MASS INDEX: 32.14 KG/M2 | SYSTOLIC BLOOD PRESSURE: 127 MMHG | HEIGHT: 69 IN | HEART RATE: 82 BPM

## 2022-01-11 DIAGNOSIS — S16.1XXA CERVICAL STRAIN, ACUTE, INITIAL ENCOUNTER: Primary | ICD-10-CM

## 2022-01-11 PROCEDURE — 25000003 PHARM REV CODE 250: Performed by: EMERGENCY MEDICINE

## 2022-01-11 RX ORDER — METHOCARBAMOL 500 MG/1
1000 TABLET, FILM COATED ORAL 3 TIMES DAILY PRN
Qty: 20 TABLET | Refills: 0 | Status: SHIPPED | OUTPATIENT
Start: 2022-01-11 | End: 2022-01-18 | Stop reason: SDUPTHER

## 2022-01-11 RX ORDER — IBUPROFEN 600 MG/1
600 TABLET ORAL EVERY 6 HOURS PRN
Qty: 15 TABLET | Refills: 0 | Status: SHIPPED | OUTPATIENT
Start: 2022-01-11 | End: 2022-01-18 | Stop reason: SDUPTHER

## 2022-01-11 RX ORDER — KETOROLAC TROMETHAMINE 30 MG/ML
30 INJECTION, SOLUTION INTRAMUSCULAR; INTRAVENOUS
Status: DISCONTINUED | OUTPATIENT
Start: 2022-01-11 | End: 2022-01-11

## 2022-01-11 RX ORDER — METHOCARBAMOL 500 MG/1
1000 TABLET, FILM COATED ORAL
Status: COMPLETED | OUTPATIENT
Start: 2022-01-11 | End: 2022-01-11

## 2022-01-11 RX ORDER — IBUPROFEN 600 MG/1
600 TABLET ORAL
Status: COMPLETED | OUTPATIENT
Start: 2022-01-11 | End: 2022-01-11

## 2022-01-11 RX ORDER — HYDROCODONE BITARTRATE AND ACETAMINOPHEN 5; 325 MG/1; MG/1
1 TABLET ORAL
Status: COMPLETED | OUTPATIENT
Start: 2022-01-11 | End: 2022-01-11

## 2022-01-11 RX ADMIN — METHOCARBAMOL TABLETS 1000 MG: 500 TABLET, COATED ORAL at 01:01

## 2022-01-11 RX ADMIN — IBUPROFEN 600 MG: 600 TABLET ORAL at 12:01

## 2022-01-11 RX ADMIN — HYDROCODONE BITARTRATE AND ACETAMINOPHEN 1 TABLET: 5; 325 TABLET ORAL at 01:01

## 2022-01-11 NOTE — Clinical Note
"Thlema Munroe" Abel was seen and treated in our emergency department on 1/10/2022.  She may return to work on 01/16/2022.       If you have any questions or concerns, please don't hesitate to call.      Ledy Martinez RN    "

## 2022-01-11 NOTE — ED PROVIDER NOTES
Encounter Date: 1/10/2022       History     Chief Complaint   Patient presents with    Neck Pain     Sudden onset of R posterior neck pain extending to R shoulder, R upper back, and down her spine. Pt denies injury. Denies any activity when pain began. Reports pain is 10/10. Pt is crying loudly in triage. Able to be calmed with redirection.     The patient is a 26-year-old female who presents to the emergency department pain to her right posterior neck.  She states the pain started tonight when she was having a panic attack and it has been hurting since then.  She denies any numbness tingling or weakness to her arms or legs.  She denies falling or hitting her head.  The patient is tearful and holding her head leaning to the right.        Review of patient's allergies indicates:  No Known Allergies  Past Medical History:   Diagnosis Date    Acute appendicitis 10/30/2020    Anxiety     Chronic pain of right knee 11/18/2018    Depression     Panic disorder without agoraphobia 9/16/2015     Past Surgical History:   Procedure Laterality Date    CARPAL TUNNEL RELEASE      FACIAL RECONSTRUCTION SURGERY      LAPAROSCOPIC APPENDECTOMY N/A 10/31/2020    Procedure: APPENDECTOMY, LAPAROSCOPIC;  Surgeon: Steve Saucedo Jr., MD;  Location: [facility];  Service: General;  Laterality: N/A;    TONSILLECTOMY       Family History   Problem Relation Age of Onset    No Known Problems Mother     No Known Problems Father      Social History     Tobacco Use    Smoking status: Never Smoker    Smokeless tobacco: Never Used   Substance Use Topics    Alcohol use: No    Drug use: No     Review of Systems   Constitutional: Negative for fever.   HENT: Negative for sore throat.    Respiratory: Negative for shortness of breath.    Cardiovascular: Negative for chest pain.   Gastrointestinal: Negative for nausea.   Genitourinary: Negative for dysuria.   Musculoskeletal: Negative for back pain.   Skin: Negative for rash.   Neurological:  Negative for weakness.   Hematological: Does not bruise/bleed easily.       Physical Exam     Initial Vitals [01/10/22 2057]   BP Pulse Resp Temp SpO2   (!) 173/94 (!) 114 (!) 22 98.5 °F (36.9 °C) 97 %      MAP       --         Physical Exam    Nursing note and vitals reviewed.  Constitutional: She appears well-developed and well-nourished.   HENT:   Head: Normocephalic and atraumatic.   Neck:   Head is tilted to the right.  Patient is able to turn her head from side to side with some difficulty.   Musculoskeletal:         General: Normal range of motion.     Neurological: She is alert and oriented to person, place, and time.   Psychiatric: Thought content normal.   Anxious         ED Course   Procedures  Labs Reviewed   POCT URINE PREGNANCY          Imaging Results          CT Cervical Spine Without Contrast (Final result)  Result time 01/11/22 00:54:16    Final result by Lio Tran MD (01/11/22 00:54:16)                 Impression:      Small disc protrusion paracentrally on the right at C5-6.  Correlate for right C5 nerve rootlet radiculopathy.    Otherwise negative CT of the cervical spine.      Electronically signed by: Lio Tran  Date:    01/11/2022  Time:    00:54             Narrative:    EXAMINATION:  CT CERVICAL SPINE WITHOUT CONTRAST    CLINICAL HISTORY:  Cervical radiculopathy, no red flags;    TECHNIQUE:  Low dose axial images, sagittal and coronal reformations were performed though the cervical spine.  Contrast was not administered.    COMPARISON:  None    FINDINGS:  Alignment is anatomic with mild straightening of the cervical lordosis.  There is no evidence of cortical fracture, disc space narrowing or hematoma.  The central canal and neural foramen are patent.    The visceral spaces and lung apices appear unremarkable.  The skull base and cervicothoracic junction appear maintained.                                 Medications   methocarbamoL tablet 1,000 mg (has no administration in  time range)   HYDROcodone-acetaminophen 5-325 mg per tablet 1 tablet (has no administration in time range)   ibuprofen tablet 600 mg (600 mg Oral Given 1/11/22 0046)                 ED Course as of 01/11/22 0130 Tue Jan 11, 2022   0126 With distraction, the patient is able to relaxer shoulders and turn her head from side to side.  She was given ibuprofen and will be given Robaxin po and norco po. [ST]      ED Course User Index  [ST] Elizabeth Loaiza MD             Clinical Impression:   Final diagnoses:  [S16.1XXA] Cervical strain, acute, initial encounter (Primary)          ED Disposition Condition    Discharge Stable        ED Prescriptions     Medication Sig Dispense Start Date End Date Auth. Provider    methocarbamoL (ROBAXIN) 500 MG Tab Take 2 tablets (1,000 mg total) by mouth 3 (three) times daily as needed (spasm). 20 tablet 1/11/2022 1/16/2022 Elizabeth Loaiza MD    ibuprofen (ADVIL,MOTRIN) 600 MG tablet Take 1 tablet (600 mg total) by mouth every 6 (six) hours as needed for Pain. 15 tablet 1/11/2022  Elizabeth Loaiza MD        Follow-up Information     Follow up With Specialties Details Why Contact Info    Amy Molina,  Family Medicine, Internal Medicine Schedule an appointment as soon as possible for a visit  As needed [address]   [address]   [place] [id number]  [phone]             Elizabeth Loaiza MD  01/11/22 0130

## 2022-01-11 NOTE — FIRST PROVIDER EVALUATION
Emergency Department TeleTriage Encounter Note      CHIEF COMPLAINT    Chief Complaint   Patient presents with    Neck Pain     Sudden onset of R posterior neck pain extending to R shoulder, R upper back, and down her spine. Pt denies injury. Denies any activity when pain began. Reports pain is 10/10. Pt is crying loudly in triage. Able to be calmed with redirection.       VITAL SIGNS   Initial Vitals [01/10/22 2057]   BP Pulse Resp Temp SpO2   (!) 173/94 (!) 114 (!) 22 98.5 °F (36.9 °C) 97 %      MAP       --            ALLERGIES    Review of patient's allergies indicates:  No Known Allergies    PROVIDER TRIAGE NOTE  TeleTriage Note: Thelma Roman, a nontoxic/well appearing, 26 y.o. female, presented to the ED with c/o right sided neck pain and headaches that began about an hour ago after she received stressful news. Denies injury or trauma.     All ED beds are full at present; patient notified of this status.  Patient seen and medically screened by Nurse Practitioner via teletriage. Orders initiated at triage to expedite care.  Patient is stable to return to the waiting room and will be placed in an ED bed when available.  Care will be transferred to an alternate provider when patient has been placed in an Exam Room from the [facility] for physical exam, additional orders, and disposition.  9:19 PM Kamila Mon DNP, FNP-C        ORDERS  Labs Reviewed - No data to display    ED Orders (720h ago, onward)    Start Ordered     Status Ordering Provider    Unscheduled 01/10/22 2121  POCT urine pregnancy  Once         Ordered KAMILA MON            Virtual Visit Note: The provider triage portion of this emergency department evaluation and documentation was performed via [employer], a HIPAA-compliant telemedicine application, in concert with a tele-presenter in the room. A face to face patient evaluation with one of my colleagues will occur once the patient is placed in an emergency department  room.      DISCLAIMER: This note was prepared with [employer] voice recognition transcription software. Garbled syntax, mangled pronouns, and other bizarre constructions may be attributed to that software system.

## 2022-01-12 ENCOUNTER — PATIENT MESSAGE (OUTPATIENT)
Dept: FAMILY MEDICINE | Facility: CLINIC | Age: 27
End: 2022-01-12
Payer: MEDICAID

## 2022-01-12 NOTE — PROGRESS NOTES
VIRTUAL/TELEMEDICINE VISIT   FAMILY MEDICINE   [facility]     The patient location is: [place]  The chief complaint leading to consultation is: neck pain  Visit type: Virtual visit with synchronous audio and video   Total time spent: 30 minute  Each patient to whom he or she provides medical services by telemedicine is:  (1) informed of the relationship between the physician and patient and the respective role of any other health care provider with respect to management of the patient; and (2) notified that he or she may decline to receive medical services by telemedicine and may withdraw from such care at any time.    Reason for visit:   Chief Complaint   Patient presents with    Neck Pain       SUBJECTIVE: Thelma Roman is a 26 y.o. female  - with anxiety, depression, and chronic knee pain presents neck pain    She was seen in the ER [facility] 1/11/22 for acute neck pain. (I personally reviewed the emergency department evaluation and note) patient presented with a sudden onset of right posterior neck pain that extended to her right shoulder, right upper back and down her spine.  She denies any inciting event, injury or trauma.  She reported the pain as 10/10 was noted to be crying loudly and triage but was able to be calmed with redirection.  She reports the pain started that night when she was having a panic attack and persisted.  She denied any numbness or tingling or weakness of her arms or legs.  She denied any head trauma.  She was tearful and unable to turn her head and appear that her most comfortable position was leaving it to the right side.  CT cervical spine was done that showed small disc protrusion paracentrally on the right at C5 to 6. Recommend correlation to right C5 nerve root radiculopathy.  Otherwise CT was negative.  She was diagnosed with a cervical strain and treated with methocarbamol 1000 mg and hydrocodone 5/325 mg as well as ibuprofen 600 mg. She was discharged with  methocarbamol 500 mg 3 times a day as needed and ibuprofen 600 mg as needed    Today she reports pain still present 8/10 but able to hold her head up straight when she could only hold it to her right side in the ER.       1. Neck Pain    Onset: 1/11/22  Location: back on neck mostly right and down her upper back on the right  Duration: with movement  Character: stiff and sharp this AM 9/10  Aggravating factors: movement  Relieving factors: not moving head; lying flat on the ground  Timing of day: all a days  Associated symptoms: denies  Negative symptoms:  Denies swelling, numbness, tingling, radiation down either for arms or her extremities, weakness, muscle wasting, bowel or bladder changes    Current medications:  Ibuprofen 600 mg 3 times a day as needed  Methocarbamol 1000 mg 3 times a day as needed        Review of Systems   Musculoskeletal: Positive for neck pain.   Neurological: Negative for tingling, tremors, sensory change, focal weakness and weakness.   All other systems reviewed and are negative.        HISTORY:   Past Medical History:   Diagnosis Date    Acute appendicitis 10/30/2020    Anxiety     Chronic pain of right knee 11/18/2018    Depression     Panic disorder without agoraphobia 9/16/2015       Past Surgical History:   Procedure Laterality Date    CARPAL TUNNEL RELEASE      FACIAL RECONSTRUCTION SURGERY      LAPAROSCOPIC APPENDECTOMY N/A 10/31/2020    Procedure: APPENDECTOMY, LAPAROSCOPIC;  Surgeon: Steve Saucedo Jr., MD;  Location: [facility];  Service: General;  Laterality: N/A;    TONSILLECTOMY         Family History   Problem Relation Age of Onset    No Known Problems Mother     No Known Problems Father        Social History     Tobacco Use    Smoking status: Never Smoker    Smokeless tobacco: Never Used   Substance Use Topics    Alcohol use: No    Drug use: No       Social History     Social History Narrative    Lives with father. Astranged from her mother. [employer], majoring in psychology with several minors. Works at a furniture sale store. In a long term relationship since 2019. Denies alcohol, smoking or illicit drugs. [employer]. 2021 enrolled in phlebotomy school        ALLERGIES:   Review of patient's allergies indicates:  No Known Allergies    MEDS:     Current Outpatient Medications:     buPROPion (WELLBUTRIN XL) 150 MG TB24 tablet, Take 1 tablet (150 mg total) by mouth once daily., Disp: 30 tablet, Rfl: 2    etonogestrel (NEXPLANON) 68 mg Impl subdermal device, by Subdermal route. , Disp: , Rfl:     ibuprofen (ADVIL,MOTRIN) 600 MG tablet, Take 1 tablet (600 mg total) by mouth every 6 (six) hours as needed for Pain., Disp: 15 tablet, Rfl: 0    methocarbamoL (ROBAXIN) 500 MG Tab, Take 2 tablets (1,000 mg total) by mouth 3 (three) times daily as needed (spasm)., Disp: 20 tablet, Rfl: 0    venlafaxine (EFFEXOR-XR) 75 MG 24 hr capsule, Take 1 capsule (75 mg total) by mouth once daily., Disp: 30 capsule, Rfl: 1    Vital signs:   There were no vitals filed for this visit.  There is no height or weight on file to calculate BMI.    PHYSICAL EXAM:     Physical Exam  Constitutional:       General: She is not in acute distress.     Appearance: She is not ill-appearing.   Neck:      Comments: Cervical range of motion decreased most notable with flexion, left rotation and left side bending.  Limited exam but instructed patient on a self Spurling's exam and Spurling exam negative  Pulmonary:      Effort: Pulmonary effort is normal. No respiratory distress.   Musculoskeletal:      Cervical back: No torticollis. Pain with movement and muscular tenderness present. Decreased range of motion.   Neurological:      Mental Status: She is alert.   Psychiatric:         Speech: Speech normal.           PHQ4 = No data recorded    PERTINENT RESULTS:   Admission on 01/11/2022, Discharged on 01/11/2022   Component Date Value Ref Range Status    POC Preg Test, Ur 01/10/2022 Negative  Negative  Final     Acceptable 01/10/2022 Yes   Final     CT Cervical Spine Without Contrast  Narrative: EXAMINATION:  CT CERVICAL SPINE WITHOUT CONTRAST    CLINICAL HISTORY:  Cervical radiculopathy, no red flags;    TECHNIQUE:  Low dose axial images, sagittal and coronal reformations were performed though the cervical spine.  Contrast was not administered.    COMPARISON:  None    FINDINGS:  Alignment is anatomic with mild straightening of the cervical lordosis.  There is no evidence of cortical fracture, disc space narrowing or hematoma.  The central canal and neural foramen are patent.    The visceral spaces and lung apices appear unremarkable.  The skull base and cervicothoracic junction appear maintained.  Impression: Small disc protrusion paracentrally on the right at C5-6.  Correlate for right C5 nerve rootlet radiculopathy.    Otherwise negative CT of the cervical spine.    Electronically signed by: Loi Tran  Date:    01/11/2022  Time:    00:54      ASSESSMENT/PLAN:  Problem List Items Addressed This Visit        Neuro    Herniation of intervertebral disc at C5-C6 level    Overview     - 01/11/2022 CT cervical spine:  Small disc protrusion paracentrally on the right at C5-C6.  - no signs of radiculopathy on exam or history  - likely incidental finding  - suspect not related with pain  - discussed results with patient            Orthopedic    Cervical strain - Primary    Overview     - supportive care  - range-of-motion exercises  - continue methocarbamol and ibuprofen  - warm compress  - discuss if no improvement 2-3 weeks will recommend physical therapy  - questions elicited and answered  - patient is agreeable with plan               Vaccines recommended: Covid-19 booster and HPV    Follow-up in 2 weeks or sooner if any concerns.      This note is dictated using the M*Modal Fluency Direct word recognition program. There are word recognition mistakes that are occasionally missed on  review.    Dr. Amy Molina D.O.   [facility]

## 2022-01-13 ENCOUNTER — OFFICE VISIT (OUTPATIENT)
Dept: FAMILY MEDICINE | Facility: CLINIC | Age: 27
End: 2022-01-13
Payer: MEDICAID

## 2022-01-13 DIAGNOSIS — M50.222 HERNIATION OF INTERVERTEBRAL DISC AT C5-C6 LEVEL: ICD-10-CM

## 2022-01-13 DIAGNOSIS — S16.1XXA STRAIN OF NECK MUSCLE, INITIAL ENCOUNTER: Primary | ICD-10-CM

## 2022-01-13 PROCEDURE — 1159F PR MEDICATION LIST DOCUMENTED IN MEDICAL RECORD: ICD-10-PCS | Mod: CPTII,95,, | Performed by: FAMILY MEDICINE

## 2022-01-13 PROCEDURE — 1160F PR REVIEW ALL MEDS BY PRESCRIBER/CLIN PHARMACIST DOCUMENTED: ICD-10-PCS | Mod: CPTII,95,, | Performed by: FAMILY MEDICINE

## 2022-01-13 PROCEDURE — 99214 PR OFFICE/OUTPT VISIT, EST, LEVL IV, 30-39 MIN: ICD-10-PCS | Mod: 95,,, | Performed by: FAMILY MEDICINE

## 2022-01-13 PROCEDURE — 1159F MED LIST DOCD IN RCRD: CPT | Mod: CPTII,95,, | Performed by: FAMILY MEDICINE

## 2022-01-13 PROCEDURE — 1160F RVW MEDS BY RX/DR IN RCRD: CPT | Mod: CPTII,95,, | Performed by: FAMILY MEDICINE

## 2022-01-13 PROCEDURE — 99214 OFFICE O/P EST MOD 30 MIN: CPT | Mod: 95,,, | Performed by: FAMILY MEDICINE

## 2022-01-15 ENCOUNTER — PATIENT MESSAGE (OUTPATIENT)
Dept: FAMILY MEDICINE | Facility: CLINIC | Age: 27
End: 2022-01-15
Payer: MEDICAID

## 2022-01-18 RX ORDER — METHOCARBAMOL 500 MG/1
1000 TABLET, FILM COATED ORAL 3 TIMES DAILY PRN
Qty: 30 TABLET | Refills: 0 | Status: SHIPPED | OUTPATIENT
Start: 2022-01-18 | End: 2022-01-28

## 2022-01-18 RX ORDER — IBUPROFEN 600 MG/1
600 TABLET ORAL EVERY 6 HOURS PRN
Qty: 30 TABLET | Refills: 0 | Status: SHIPPED | OUTPATIENT
Start: 2022-01-18 | End: 2022-02-17

## 2022-02-09 ENCOUNTER — PATIENT MESSAGE (OUTPATIENT)
Dept: PSYCHIATRY | Facility: CLINIC | Age: 27
End: 2022-02-09
Payer: MEDICAID

## 2022-02-10 ENCOUNTER — PATIENT MESSAGE (OUTPATIENT)
Dept: PSYCHIATRY | Facility: CLINIC | Age: 27
End: 2022-02-10
Payer: MEDICAID

## 2022-02-10 DIAGNOSIS — F32.1 CURRENT MODERATE EPISODE OF MAJOR DEPRESSIVE DISORDER, UNSPECIFIED WHETHER RECURRENT: ICD-10-CM

## 2022-02-10 DIAGNOSIS — F41.0 GENERALIZED ANXIETY DISORDER WITH PANIC ATTACKS: ICD-10-CM

## 2022-02-10 DIAGNOSIS — F41.1 GENERALIZED ANXIETY DISORDER WITH PANIC ATTACKS: ICD-10-CM

## 2022-02-11 RX ORDER — VENLAFAXINE HYDROCHLORIDE 75 MG/1
75 CAPSULE, EXTENDED RELEASE ORAL DAILY
Qty: 30 CAPSULE | Refills: 1 | Status: SHIPPED | OUTPATIENT
Start: 2022-02-11 | End: 2022-02-17

## 2022-02-17 ENCOUNTER — OFFICE VISIT (OUTPATIENT)
Dept: PSYCHIATRY | Facility: CLINIC | Age: 27
End: 2022-02-17
Payer: MEDICAID

## 2022-02-17 DIAGNOSIS — F41.1 GENERALIZED ANXIETY DISORDER: ICD-10-CM

## 2022-02-17 DIAGNOSIS — F41.1 GENERALIZED ANXIETY DISORDER WITH PANIC ATTACKS: ICD-10-CM

## 2022-02-17 DIAGNOSIS — F33.1 MODERATE EPISODE OF RECURRENT MAJOR DEPRESSIVE DISORDER: Primary | ICD-10-CM

## 2022-02-17 DIAGNOSIS — F41.0 GENERALIZED ANXIETY DISORDER WITH PANIC ATTACKS: ICD-10-CM

## 2022-02-17 DIAGNOSIS — F43.10 PTSD (POST-TRAUMATIC STRESS DISORDER): ICD-10-CM

## 2022-02-17 DIAGNOSIS — F32.1 CURRENT MODERATE EPISODE OF MAJOR DEPRESSIVE DISORDER, UNSPECIFIED WHETHER RECURRENT: ICD-10-CM

## 2022-02-17 PROCEDURE — 99214 OFFICE O/P EST MOD 30 MIN: CPT | Mod: 95,AF,HB, | Performed by: STUDENT IN AN ORGANIZED HEALTH CARE EDUCATION/TRAINING PROGRAM

## 2022-02-17 PROCEDURE — 99214 PR OFFICE/OUTPT VISIT, EST, LEVL IV, 30-39 MIN: ICD-10-PCS | Mod: 95,AF,HB, | Performed by: STUDENT IN AN ORGANIZED HEALTH CARE EDUCATION/TRAINING PROGRAM

## 2022-02-17 RX ORDER — VENLAFAXINE HYDROCHLORIDE 150 MG/1
150 CAPSULE, EXTENDED RELEASE ORAL DAILY
Qty: 30 CAPSULE | Refills: 2 | Status: SHIPPED | OUTPATIENT
Start: 2022-02-17 | End: 2022-03-09 | Stop reason: SDUPTHER

## 2022-02-17 RX ORDER — HYDROXYZINE PAMOATE 25 MG/1
25 CAPSULE ORAL NIGHTLY PRN
Qty: 30 CAPSULE | Refills: 1 | Status: SHIPPED | OUTPATIENT
Start: 2022-02-17 | End: 2022-02-24 | Stop reason: SDUPTHER

## 2022-02-17 RX ORDER — BUPROPION HYDROCHLORIDE 150 MG/1
150 TABLET ORAL DAILY
Qty: 30 TABLET | Refills: 2 | Status: SHIPPED | OUTPATIENT
Start: 2022-02-17 | End: 2022-08-22 | Stop reason: SDUPTHER
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
## 2022-02-18 NOTE — PROGRESS NOTES
02/18/2022: I reviewed and discussed this patient's treatment plan and diagnosis with Dr Wiedemann and agree with both at this time. AMBER Glasgow MD

## 2022-02-22 ENCOUNTER — PATIENT MESSAGE (OUTPATIENT)
Dept: PSYCHIATRY | Facility: CLINIC | Age: 27
End: 2022-02-22
Payer: MEDICAID

## 2022-02-23 ENCOUNTER — PATIENT MESSAGE (OUTPATIENT)
Dept: PSYCHIATRY | Facility: CLINIC | Age: 27
End: 2022-02-23
Payer: MEDICAID

## 2022-02-23 DIAGNOSIS — F41.1 GENERALIZED ANXIETY DISORDER: ICD-10-CM

## 2022-02-24 ENCOUNTER — PATIENT MESSAGE (OUTPATIENT)
Dept: PSYCHIATRY | Facility: CLINIC | Age: 27
End: 2022-02-24
Payer: MEDICAID

## 2022-02-24 RX ORDER — HYDROXYZINE PAMOATE 25 MG/1
25 CAPSULE ORAL NIGHTLY PRN
Qty: 30 CAPSULE | Refills: 2 | Status: SHIPPED | OUTPATIENT
Start: 2022-02-24 | End: 2022-08-22 | Stop reason: SDUPTHER

## 2022-03-08 ENCOUNTER — PATIENT MESSAGE (OUTPATIENT)
Dept: PSYCHIATRY | Facility: CLINIC | Age: 27
End: 2022-03-08
Payer: MEDICAID

## 2022-03-08 DIAGNOSIS — F41.0 GENERALIZED ANXIETY DISORDER WITH PANIC ATTACKS: ICD-10-CM

## 2022-03-08 DIAGNOSIS — F33.1 MODERATE EPISODE OF RECURRENT MAJOR DEPRESSIVE DISORDER: ICD-10-CM

## 2022-03-08 DIAGNOSIS — F41.1 GENERALIZED ANXIETY DISORDER WITH PANIC ATTACKS: ICD-10-CM

## 2022-03-08 DIAGNOSIS — F32.1 CURRENT MODERATE EPISODE OF MAJOR DEPRESSIVE DISORDER, UNSPECIFIED WHETHER RECURRENT: ICD-10-CM

## 2022-03-08 DIAGNOSIS — F41.1 GENERALIZED ANXIETY DISORDER: Primary | ICD-10-CM

## 2022-03-09 ENCOUNTER — PATIENT MESSAGE (OUTPATIENT)
Dept: PSYCHIATRY | Facility: CLINIC | Age: 27
End: 2022-03-09
Payer: MEDICAID

## 2022-03-09 RX ORDER — VENLAFAXINE HYDROCHLORIDE 150 MG/1
150 CAPSULE, EXTENDED RELEASE ORAL DAILY
Qty: 30 CAPSULE | Refills: 2 | Status: SHIPPED | OUTPATIENT
Start: 2022-03-09 | End: 2022-08-10 | Stop reason: SDUPTHER

## 2022-03-18 ENCOUNTER — PATIENT MESSAGE (OUTPATIENT)
Dept: PSYCHIATRY | Facility: CLINIC | Age: 27
End: 2022-03-18
Payer: MEDICAID

## 2022-03-18 ENCOUNTER — TELEPHONE (OUTPATIENT)
Dept: PSYCHIATRY | Facility: CLINIC | Age: 27
End: 2022-03-18
Payer: MEDICAID

## 2022-03-18 NOTE — TELEPHONE ENCOUNTER
Attempt to contact the patient to inform today's appt has to be r/s, provider out sick. No answer left VM also sent portal message to patient

## 2022-04-16 ENCOUNTER — HOSPITAL ENCOUNTER (EMERGENCY)
Facility: HOSPITAL | Age: 27
Discharge: HOME | End: 2022-04-16
Attending: EMERGENCY MEDICINE
Payer: MEDICAID

## 2022-04-16 VITALS
OXYGEN SATURATION: 97 % | RESPIRATION RATE: 18 BRPM | HEART RATE: 98 BPM | DIASTOLIC BLOOD PRESSURE: 70 MMHG | TEMPERATURE: 99 F | SYSTOLIC BLOOD PRESSURE: 129 MMHG

## 2022-04-16 DIAGNOSIS — M25.531 WRIST PAIN, ACUTE, RIGHT: Primary | ICD-10-CM

## 2022-04-16 PROCEDURE — 99283 EMERGENCY DEPT VISIT LOW MDM: CPT

## 2022-04-16 PROCEDURE — 25000003 PHARM REV CODE 250: Performed by: EMERGENCY MEDICINE

## 2022-04-16 RX ORDER — IBUPROFEN 600 MG/1
600 TABLET ORAL
Status: COMPLETED | OUTPATIENT
Start: 2022-04-16 | End: 2022-04-16

## 2022-04-16 RX ORDER — ACETAMINOPHEN 500 MG
1000 TABLET ORAL
Status: COMPLETED | OUTPATIENT
Start: 2022-04-16 | End: 2022-04-16

## 2022-04-16 RX ORDER — CYCLOBENZAPRINE HCL 10 MG
10 TABLET ORAL 3 TIMES DAILY PRN
Qty: 15 TABLET | Refills: 0 | Status: SHIPPED | OUTPATIENT
Start: 2022-04-16 | End: 2022-08-26

## 2022-04-16 RX ORDER — CYCLOBENZAPRINE HCL 10 MG
10 TABLET ORAL
Status: COMPLETED | OUTPATIENT
Start: 2022-04-16 | End: 2022-04-16

## 2022-04-16 RX ADMIN — ACETAMINOPHEN 1000 MG: 500 TABLET ORAL at 05:04

## 2022-04-16 RX ADMIN — CYCLOBENZAPRINE 10 MG: 10 TABLET, FILM COATED ORAL at 05:04

## 2022-04-16 RX ADMIN — IBUPROFEN 600 MG: 600 TABLET ORAL at 05:04
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
## 2022-04-16 NOTE — Clinical Note
"Thelma Munroe" Abel was seen and treated in our emergency department on 4/16/2022.  She may return to work on 04/17/2022.       If you have any questions or concerns, please don't hesitate to call.      Anna Jacobs MD"
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
## 2022-04-16 NOTE — DISCHARGE INSTRUCTIONS
Please take Tylenol Motrin as needed, please ice packs, please use Ace wrap.  He may use muscle relaxant for significant pain.  He will have a work note for 1 day.  Follow-up with primary care return the ER for any concerning reason.

## 2022-04-18 ENCOUNTER — OFFICE VISIT (OUTPATIENT)
Dept: URGENT CARE | Facility: CLINIC | Age: 27
End: 2022-04-18
Payer: OTHER MISCELLANEOUS

## 2022-04-18 VITALS
RESPIRATION RATE: 16 BRPM | HEIGHT: 67 IN | SYSTOLIC BLOOD PRESSURE: 114 MMHG | HEART RATE: 134 BPM | WEIGHT: 218 LBS | BODY MASS INDEX: 34.21 KG/M2 | TEMPERATURE: 98 F | OXYGEN SATURATION: 98 % | DIASTOLIC BLOOD PRESSURE: 79 MMHG

## 2022-04-18 DIAGNOSIS — S63.501A SPRAIN OF RIGHT WRIST, INITIAL ENCOUNTER: Primary | ICD-10-CM

## 2022-04-18 DIAGNOSIS — Z02.6 ENCOUNTER RELATED TO WORKER'S COMPENSATION CLAIM: ICD-10-CM

## 2022-04-18 LAB
CTP QC/QA: YES
POC 10 PANEL DRUG SCREEN: NEGATIVE

## 2022-04-18 PROCEDURE — 80305 POCT RAPID DRUG SCREEN 10 PANEL: ICD-10-PCS | Mod: S$GLB,,, | Performed by: NURSE PRACTITIONER

## 2022-04-18 PROCEDURE — 73110 X-RAY EXAM OF WRIST: CPT | Mod: RT,S$GLB,, | Performed by: RADIOLOGY

## 2022-04-18 PROCEDURE — 99212 PR OFFICE/OUTPT VISIT, EST, LEVL II, 10-19 MIN: ICD-10-PCS | Mod: S$GLB,,, | Performed by: NURSE PRACTITIONER

## 2022-04-18 PROCEDURE — 80305 DRUG TEST PRSMV DIR OPT OBS: CPT | Mod: S$GLB,,, | Performed by: NURSE PRACTITIONER

## 2022-04-18 PROCEDURE — 99212 OFFICE O/P EST SF 10 MIN: CPT | Mod: S$GLB,,, | Performed by: NURSE PRACTITIONER

## 2022-04-18 PROCEDURE — 73110 XR WRIST COMPLETE 3 VIEWS RIGHT: ICD-10-PCS | Mod: RT,S$GLB,, | Performed by: RADIOLOGY

## 2022-04-18 NOTE — PROGRESS NOTES
Subjective:       Patient ID: Thelma Roman is a 26 y.o. female.    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 She has had previous surgery ( Carpel Tunnel ) on RT Wrist 5 yrs ago. She went to the ER in [facility] and was evaluated. Taking Flexeril and IBP 800mg with little help. Pain score 5-6/10 with complaints of Constant Dull pain, Intermittent Shooting pain that radiates up to RT elbow, Numbness/Tingling of Fingers of RT Hand, Swelling. SH    Wrist Injury   Associated symptoms include numbness.       Musculoskeletal: Positive for pain, joint pain, joint swelling, abnormal ROM of joint and muscle ache. Negative for trauma and muscle cramps.   Skin: Negative for abrasion, erythema and bruising.   Neurological: Positive for numbness and tingling.        Objective:      Physical Exam  Vitals and nursing note reviewed.   Constitutional:       General: She is not in acute distress.     Appearance: She is well-developed. She is not diaphoretic.   HENT:      Head: Normocephalic and atraumatic.      Right Ear: Hearing and external ear normal.      Left Ear: Hearing and external ear normal.      Nose: Nose normal. No nasal deformity.   Eyes:      General: Lids are normal. No scleral icterus.     Conjunctiva/sclera: Conjunctivae normal.   Neck:      Trachea: Trachea normal.   Cardiovascular:      Pulses: Normal pulses.   Pulmonary:      Effort: Pulmonary effort is normal. No respiratory distress.      Breath sounds: No stridor.   Musculoskeletal:      Right wrist: Normal. No swelling or tenderness. Normal range of motion.      Cervical back: Normal range of motion.   Skin:     General: Skin is warm and dry.      Capillary Refill: Capillary refill takes less than 2 seconds.      Findings: No erythema.   Neurological:      Mental Status: She is alert. She is not disoriented.       GCS: GCS eye subscore is 4. GCS verbal subscore is 5. GCS motor subscore is 6.      Sensory: No sensory deficit.   Psychiatric:         Attention and Perception: She is attentive.         Speech: Speech normal.         Behavior: Behavior normal.       negative tinnel/phalen  Xray with small calcification on ulnar aspect. I suspect calcification of tendon most likely from previous surgery. She had normal xray in 2015     Assessment:       1. Sprain of right wrist, initial encounter    2. Encounter related to worker's compensation claim        Plan:     patient states she is afraid of something like carpal tunnel happening again .   Will have her wear her splint, ice and elevate, ibuprofen. Light duty and follow up in one week.        Patient Instructions: Attention not to aggravate affected area, Daily home exercises/warm soaks, Apply ice 24-48 hours then apply heat/warm soaks   Restrictions: Limited use of right hand and arm  Follow up in about 1 week (around 4/25/2022).

## 2022-04-18 NOTE — LETTER
[facility]  [address] [address]  Phone: [phone]  Fax: [phone]  [facility]: [id number]    Pt Name: Thelma Ko Date: 04/16/2022   Employee ID: [id number] Date of First Treatment: 04/18/2022   Company: [employer]      Appointment Time: 02:10 PM Arrived: 2:13 PM   Provider: NAS Chakraborty Time Out: 4:30 PM     Office Treatment:   1. Sprain of right wrist, initial encounter    2. Encounter related to worker's compensation claim          Patient Instructions: Attention not to aggravate affected area, Daily home exercises/warm soaks, Apply ice 24-48 hours then apply heat/warm soaks      Restrictions: Limited use of right hand and arm     Return Appointment: 4/25/2022 at 1:00 PM [facility]

## 2022-04-25 ENCOUNTER — OFFICE VISIT (OUTPATIENT)
Dept: URGENT CARE | Facility: CLINIC | Age: 27
End: 2022-04-25
Payer: OTHER MISCELLANEOUS

## 2022-04-25 DIAGNOSIS — S63.501A SPRAIN OF RIGHT WRIST, INITIAL ENCOUNTER: Primary | ICD-10-CM

## 2022-04-25 DIAGNOSIS — Z02.6 ENCOUNTER RELATED TO WORKER'S COMPENSATION CLAIM: ICD-10-CM

## 2022-04-25 PROCEDURE — 99213 PR OFFICE/OUTPT VISIT, EST, LEVL III, 20-29 MIN: ICD-10-PCS | Mod: S$GLB,,, | Performed by: NURSE PRACTITIONER

## 2022-04-25 PROCEDURE — 99213 OFFICE O/P EST LOW 20 MIN: CPT | Mod: S$GLB,,, | Performed by: NURSE PRACTITIONER

## 2022-04-25 NOTE — PROGRESS NOTES
Subjective:       Patient ID: Thelma Rmoan is a 26 y.o. female.    Chief Complaint: Wrist Injury (RT)     Follow-up of RT Wrist injury ( DOI 04-16-22 ) Pain score 4/10 with complaints of Constant Dull Aching pain, Intermittent Sharp pain that radiates up are to RT Elbow, Numbness/Tingling of RT Pinky and Fingers, Intermittent Swelling. Taking Flexeril, IBP 800mg, using Brace. SH    Had carpal tunnel release 5 years ago on R. Has not taken the Flexeril. Takes 600mg of Ibuprofen once a day. Pain level unchanged. Has not returned to work yet (works [employer]). Has spoke to her manager regarding LD status. Also c/o shooting pain from wrist to R elbow.  MWT      Gastrointestinal: Negative for abdominal pain, nausea and vomiting.   Musculoskeletal: Positive for pain, joint pain, joint swelling, abnormal ROM of joint and muscle ache. Negative for trauma.   Skin: Negative for color change, erythema and bruising.   Neurological: Positive for numbness and tingling.        Objective:      Physical Exam  Constitutional:       General: She is not in acute distress.     Appearance: Normal appearance.   HENT:      Right Ear: External ear normal.      Left Ear: External ear normal.   Eyes:      Conjunctiva/sclera: Conjunctivae normal.   Cardiovascular:      Pulses: Normal pulses.   Pulmonary:      Effort: Pulmonary effort is normal.   Musculoskeletal:         General: Tenderness present. No swelling.      Right elbow: Normal.      Right wrist: Tenderness and crepitus present. No swelling, deformity or lacerations. Normal range of motion. Normal pulse.        Hands:       Comments: Pain more to medial and ulnar aspects of R wrist. Pain shoots up to elbow (ulnar side). Pain with ROM testing, flexion, extension, radial and ulnar deviation. C/o numbness to 4th and pinky fingers. Neg Tinel's. C/o numbness index and pinky fingers with Phalan's. Neg Finkelstein test. Strong .   FROM to elbow without pain. Pain shoots from wrist  to elbow, ulnar side.   Skin:     General: Skin is warm and dry.      Capillary Refill: Capillary refill takes less than 2 seconds.      Findings: No bruising or erythema.   Neurological:      General: No focal deficit present.      Mental Status: She is alert and oriented to person, place, and time.   Psychiatric:         Mood and Affect: Mood normal.         Behavior: Behavior normal.         Thought Content: Thought content normal.         Judgment: Judgment normal.         Assessment:       1. Sprain of right wrist, initial encounter    2. Encounter related to worker's compensation claim        Plan:       Thelma will take off the splint at home and soak in warm water and do ROM exercises. She may take Ibuprofen as needed.     Patient Instructions: Attention not to aggravate affected area, Daily home exercises/warm soaks (May wear splint to work. Take splint off at home. OK to alternate ice and heat.)   Restrictions: Limited use of right hand and arm  Follow up in about 1 week (around 5/2/2022).

## 2022-04-25 NOTE — LETTER
[facility]  [address] [address]  Phone: [phone]  Fax: [phone]  [facility]: [id number]     Name: Thelma Ko Date: 04/16/2022   Employee ID: [id number] Date of Treatment: 04/25/2022   Company: [employer]      Appointment Time: 1:00 PM Arrived: 12:32 PM   Provider: Farzana Pena NP Time Out: 2:10 PM     Office Treatment:   1. Sprain of right wrist, initial encounter    2. Encounter related to worker's compensation claim          Patient Instructions: Attention not to aggravate affected area, Daily home exercises/warm soaks (May wear splint to work. Take splint off at home. OK to alternate ice and heat.)      Restrictions: Limited use of right hand and arm     Return Appointment: 5/2/2022 at 10:15 AM [facility]

## 2022-04-29 ENCOUNTER — PATIENT MESSAGE (OUTPATIENT)
Dept: PSYCHIATRY | Facility: CLINIC | Age: 27
End: 2022-04-29
Payer: MEDICAID

## 2022-05-02 ENCOUNTER — OFFICE VISIT (OUTPATIENT)
Dept: URGENT CARE | Facility: CLINIC | Age: 27
End: 2022-05-02
Payer: COMMERCIAL

## 2022-05-02 DIAGNOSIS — M25.531 PAIN, WRIST, RIGHT: ICD-10-CM

## 2022-05-02 DIAGNOSIS — S63.501D SPRAIN OF RIGHT WRIST, SUBSEQUENT ENCOUNTER: ICD-10-CM

## 2022-05-02 DIAGNOSIS — Z02.6 ENCOUNTER RELATED TO WORKER'S COMPENSATION CLAIM: Primary | ICD-10-CM

## 2022-05-02 DIAGNOSIS — Z98.890 HISTORY OF CARPAL TUNNEL SURGERY OF RIGHT WRIST: ICD-10-CM

## 2022-05-02 PROCEDURE — 99214 OFFICE O/P EST MOD 30 MIN: CPT | Mod: S$GLB,,, | Performed by: PREVENTIVE MEDICINE

## 2022-05-02 PROCEDURE — 99214 PR OFFICE/OUTPT VISIT, EST, LEVL IV, 30-39 MIN: ICD-10-PCS | Mod: S$GLB,,, | Performed by: PREVENTIVE MEDICINE

## 2022-05-02 RX ORDER — IBUPROFEN 600 MG/1
600 TABLET ORAL EVERY 6 HOURS PRN
Qty: 60 TABLET | Refills: 0 | Status: SHIPPED | OUTPATIENT
Start: 2022-05-02 | End: 2022-08-26

## 2022-05-02 NOTE — LETTER
[facility]  [address] [address]  Phone: [phone]  Fax: [phone]  [facility]: [id number]    Pt Name: Thelma Ko Date: 04/16/2022   Employee ID: [id number] Date of Treatment: 05/02/2022   Company: [employer]      Appointment Time: 10:15 AM Arrived: 9:41 AM   Provider: Eben Thomas MD Time Out: 11:05 AM     Office Treatment:   1. Encounter related to worker's compensation claim    2. Sprain of right wrist, subsequent encounter    3. Pain, wrist, right    4. 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

## 2022-05-02 NOTE — PROGRESS NOTES
Subjective:       Patient ID: Thelma Roman is a 26 y.o. female.    Chief Complaint: Wrist Injury (Right )    WC, RV, (DOI 04-16-22) work for [employer] and the patient reports she has returned to work and the wrist pain has gotten worst. Se says she is on light duty but any time she tries to  something the pain increases. She also reports pain in the middle of her palm; likening it to a round object with a slight point being pressed inside her hand. She is taking the IB and using warm, soaks as prescribed. Current pain is a 6 now that she is at work and 8 if she is trying to  something.LRC       Constitution: Negative.   HENT: Negative.    Neck: neck negative.   Cardiovascular: Negative.    Eyes: Negative.    Respiratory: Negative.  Negative for shortness of breath.    Gastrointestinal: Negative for abdominal trauma, abdominal pain, abdominal bloating, nausea and vomiting.   Endocrine: negative.   Genitourinary: Negative.    Musculoskeletal: Positive for pain, trauma, joint pain, joint swelling, abnormal ROM of joint, muscle cramps and muscle ache.   Skin: Negative.  Negative for color change, wound, abrasion, laceration, puncture wound and erythema.   Allergic/Immunologic: Negative.    Neurological: Positive for numbness and tingling. Negative for dizziness, light-headedness and passing out.        Objective:      Physical Exam  Musculoskeletal:      Right wrist: Tenderness present. No swelling, deformity, effusion, lacerations, bony tenderness, snuff box tenderness or crepitus. Decreased range of motion. Normal pulse.        Arms:       Comments: Patient continues to complain of pain with palpation in all range of motion testing of her right wrist.  She has no evidence of swelling or ecchymosis in this region.  Her vascular inflow including radial pulses intact.  She has pain with minimal crepitus on flexion extension and rotation of the right wrist.  Pain radiates from her right wrist to her right forearm  at this time with both palpation and range of motion testing.   Skin:     Findings: No erythema.       X-Ray Wrist Complete Right    Result Date: 4/18/2022  EXAMINATION: XR WRIST COMPLETE 3 VIEWS RIGHT CLINICAL HISTORY: Unspecified sprain of right wrist, initial encounter FINDINGS: Wrist complete three views right: There is a small dystrophic calcification distal to the ulnar styloid.  No acute fracture dislocation bone destruction seen. Electronically signed by: David Mixon MD Date:    04/18/2022 Time:    16:02  Assessment:       1. Encounter related to worker's compensation claim    2. Sprain of right wrist, subsequent encounter    3. Pain, wrist, right        Plan:     discussed with patient results of x-rays of the right wrist which revealed no acute fractures or bony abnormalities associated with trauma.  No other abnormality was detected aside from benign calcification distal to the ulnar styloid likely a remnant of her previous surgery for carpal tunnel release. She was reassured that the pain she was experiencing is associated with soft tissue injury which does not require any intervention she will therefore begin physical therapy to her right wrist, continue work restrictions as outlined below, and use ibuprofen 600 mg as needed for pain.    Medications Ordered This Encounter   Medications    ibuprofen (ADVIL,MOTRIN) 600 MG tablet     Sig: Take 1 tablet (600 mg total) by mouth every 6 (six) hours as needed for Pain (Take medication with food).     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

## 2022-05-06 ENCOUNTER — CLINICAL SUPPORT (OUTPATIENT)
Dept: REHABILITATION | Facility: HOSPITAL | Age: 27
End: 2022-05-06
Attending: PREVENTIVE MEDICINE
Payer: OTHER MISCELLANEOUS

## 2022-05-06 DIAGNOSIS — M25.531 WRIST PAIN, ACUTE, RIGHT: ICD-10-CM

## 2022-05-06 DIAGNOSIS — R29.898 WRIST WEAKNESS: ICD-10-CM

## 2022-05-06 DIAGNOSIS — M25.631 DECREASED RANGE OF MOTION OF RIGHT WRIST: ICD-10-CM

## 2022-05-06 PROBLEM — M25.639 DECREASED ROM OF WRIST: Status: ACTIVE | Noted: 2022-05-06

## 2022-05-06 PROCEDURE — 97161 PT EVAL LOW COMPLEX 20 MIN: CPT

## 2022-05-06 NOTE — Clinical Note
Elaine,   I just evaluated a patient you all have seen previously, Thelma Roman. Her symptoms were all over the place today. It appeared that she was having both musculoskeletal pain, but also nerve pain. The patient is convinced her symptoms are similar to her old carpal tunnel issue. She was having numbness and tingling in both her pinky and index finger with objective testing today. She also told me that her job does not have her on light duty. 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 The patient said her work is definitely making her pain worse. I just wanted to keep you all in the loop. Thanks and let me know if you have any questions.   Joni Adame

## 2022-05-06 NOTE — PLAN OF CARE
[facility]   Physical Therapy Initial Evaluation      Date: 5/6/2022   Name: Thelma Roman  Clinic Number: [id number]    Therapy Diagnosis:   Encounter Diagnoses   Name Primary?    Wrist pain, acute, right     Decreased range of motion of right wrist     Wrist weakness      Physician: Eben Thomas MD    Physician Orders: PT Eval and Treat   Medical Diagnosis from Referral:   S63.501D (ICD-10-CM) - Sprain of right wrist, subsequent encounter   M25.531 (ICD-10-CM) - Pain, wrist, right   Evaluation Date: 5/6/2022  Authorization Period Expiration: 5/2/2023  Plan of Care Expiration: 7/28/2022  Progress Note Due: 6/6/2022  Visit # / Visits authorized: 1/ 9   FOTO: 1/5    Precautions: Standard     Time In: 705  Time Out: 755  Total Appointment Time (timed & untimed codes): 50 minutes      SUBJECTIVE     Date of onset: 4/16/2022    History of current condition - Thelma reports: 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 Patient reports she was able to move her R wrist after the incident but just had a constant ache. Patient reports she had xrays which did not show any fractures/dislocations. Patient reports they put her in a wrist splint for when she is at work. Patient reports she is taking IB profen as needed which is the only thing that helps with pain. Patient reports she is suppose to be on light duty, but she is working like normal. Patient reports her pain hurts worst at work because she is constantly using it. Patient reports on her off days she feels much better. Patient reports she worst 7 on and 7 off. Patients pain was getting much better until she returned to work for her 7 day on shifts. Patient reports her pain was much worse than originally after working. Patient reports her pain is mainly on the palmar side of the wrist that is dull and achy that is constant. Patient reports her pain is going into the palm of her  hand as well.     Imaging, xrays: See imaging section     Prior Therapy: Yes for carpal tunnel 5 years   Occupation: Phlebotomist   Prior Level of Function: No pain with ADLs or leisure activity   Current Level of Function: Difficulty driving/riding bike/video games     Pain:  Current 6/10, worst 8/10, best 4/10   Location: right wrists  .   Description: Aching, Dull, Numb and Sharp  Aggravating Factors: Grabbing, , using hands  Easing Factors: rest and medicine     Patients goals: to reduce wrist pain and return to ADLs without pain      Medical History:   Past Medical History:   Diagnosis Date    Acute appendicitis 10/30/2020    Anxiety     Chronic pain of right knee 11/18/2018    Depression     Panic disorder without agoraphobia 9/16/2015       Surgical History:   Thelma Roman  has a past surgical history that includes Facial reconstruction surgery; Tonsillectomy; Carpal tunnel release; and Laparoscopic appendectomy (N/A, 10/31/2020).    Medications:   Thelma has a current medication list which includes the following prescription(s): bupropion, cyclobenzaprine, nexplanon, hydroxyzine pamoate, ibuprofen, and venlafaxine.    Allergies:   Review of patient's allergies indicates:  No Known Allergies       OBJECTIVE           Wrist Right Right Right Left Left Left Pain/Dysfunction with Movement    AROM PROM MMT AROM PROM MMT Pain = !    Flexion 85  95 ! 4/5 ! 85 95 5/5 Numb/tingling of index finger tip    Extension 65 75 ! 4+/5 ! 70 75 5/5    Radial deviation 15 20 ! 4+/5 ! 25 45 5/5    Ulnar deviation 70 70 ! 4+/5 ! 50 55 5/5 Tingling of pinky finger        TFCC Compression test: Positive on R wrist     Phalens Test: Positive for pain / tingling    Pronator Teres Compression: Positive for index finger tingling               Limitation/Restriction for FOTO Wrist Survey    Therapist reviewed FOTO scores for Thelma Roman on 5/6/2022.   FOTO documents entered into EPIC - see Media section.    Limitation  "Score: 40%         TREATMENT     Ice 10 minutes on R wrist       PATIENT EDUCATION AND HOME EXERCISES     Education provided:   - Purpose of PT  - Ice after a long day work  - Try to minimize use of wrist when not needed        ASSESSMENT     Thelma is a 26 y.o. female referred to outpatient Physical Therapy with a medical diagnosis of sprain of R wrist. Patient presents to physical therapy with a 3 week history of R wrist pain from one of her patiens grabbing her wrist when trying to draw blood. Patient was very irritable with her pain today and had symptoms that appeared to be stemming from multiple sources. Patient had Wrist pain with majority of range of motion and the patient reported that her pain felt like "joint pain". However, through some special testing she was reporting nerve like pain that was radiating to her pinky and index finger. Patient also had pain when compressing pronator teres to get index finger tingling. 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 Patient was educated the importance of letting this take time to heal. Patient is also nervous because her symptoms feel very similar to her carpal tunnel issues form the past.     Patient prognosis is Good.   Patient will benefit from skilled outpatient Physical Therapy to address the deficits stated above and in the chart below, provide patient /family education, and to maximize patientt's level of independence.     Plan of care discussed with patient: Yes  Patient's spiritual, cultural and educational needs considered and patient is agreeable to the plan of care and goals as stated below:     Anticipated Barriers for therapy: Work load at her job     Medical Necessity is demonstrated by the following  History  Co-morbidities and personal factors that may impact the plan of care " Co-morbidities:   anxiety and depression    Personal Factors:   no deficits     moderate   Examination  Body Structures and Functions, activity limitations and participation restrictions that may impact the plan of care Body Regions:   upper extremities    Body Systems:    ROM  strength    Participation Restrictions:   None    Activity limitations:   Learning and applying knowledge  no deficits    General Tasks and Commands  no deficits  None    Communication  no deficits    Mobility  lifting and carrying objects  fine hand use (grasping/picking up)    Self care  no deficits    Domestic Life  no deficits    Interactions/Relationships  no deficits    Life Areas  employment    Community and Social Life  recreation and leisure         high   Clinical Presentation stable and uncomplicated low   Decision Making/ Complexity Score: low     Goals:  Short Term Goals (2 Weeks):   1. Pt will be independent with HEP to supplement PT in improving functional use of R UE.    Long Term Goals (6 Weeks):   1. 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  2. Pt will increase R Wrist AROM to WNL in all planes to improve functional use of R RUE.  3. Pt will increase R Wrist PROM to WFL in all planes to improve functional use of R RUE.  4. Pt will improve R Wrist MMTs to = L Wrist to promote equal use of B UEs in performing functional tasks.  5. Pt to report pain </= 0/10 with ADLs and IADLs using R Wrist to improve functional QOL.      PLAN   Plan of care Certification: 5/6/2022 to 7/28/2022.    Outpatient Physical Therapy 3 times weekly for 3 weeks to include the following interventions: Manual Therapy, Moist Heat/ Ice, Neuromuscular Re-ed, Patient Education, Therapeutic Activities, Therapeutic Exercise and FDN.     Joni Adame, PT      I CERTIFY THE NEED FOR THESE SERVICES FURNISHED UNDER THIS PLAN OF TREATMENT AND WHILE UNDER MY CARE   Physician's  comments:     Physician's Signature: ___________________________________________________

## 2022-05-09 ENCOUNTER — OFFICE VISIT (OUTPATIENT)
Dept: URGENT CARE | Facility: CLINIC | Age: 27
End: 2022-05-09
Payer: OTHER MISCELLANEOUS

## 2022-05-09 DIAGNOSIS — S63.501D SPRAIN OF RIGHT WRIST, SUBSEQUENT ENCOUNTER: Primary | ICD-10-CM

## 2022-05-09 DIAGNOSIS — M25.531 RIGHT WRIST PAIN: ICD-10-CM

## 2022-05-09 PROCEDURE — 99214 OFFICE O/P EST MOD 30 MIN: CPT | Mod: S$GLB,,, | Performed by: EMERGENCY MEDICINE

## 2022-05-09 PROCEDURE — 99214 PR OFFICE/OUTPT VISIT, EST, LEVL IV, 30-39 MIN: ICD-10-PCS | Mod: S$GLB,,, | Performed by: EMERGENCY MEDICINE

## 2022-05-09 NOTE — LETTER
[facility]  [address] [address]  Phone: [phone]  Fax: [phone]  [employer] [employer]: [email or web address]     Name: Thelma Ko Date: 04/16/2022   Employee ID: [id number] Date of Treatment: 05/09/2022   Company: [employer]      Appointment Time: 12:30 PM Arrived: 11:54 AM   Provider: Khai Murphy MD Time Out: 1:30 PM     Office Treatment:   1. Sprain of right wrist, subsequent encounter    2. Right wrist pain          Patient Instructions: Attention not to aggravate affected area, Elevated affected area, Apply ice 24-48 hours then apply heat/warm soaks, Continue Physical Therapy, MRI to be scheduled once authorized      Restrictions: Limited use of right hand and arm (ALLOW TO WEAR BRACE AT WORK. NO USE OF THE RIGHT HAND AND ARM AT WORK)     Return Appointment: 5/23/2022 at 9:45 AM [place]

## 2022-05-09 NOTE — PROGRESS NOTES
Subjective:       Patient ID: Thelma Roman is a 26 y.o. female.    Chief Complaint: No chief complaint on file.    WC, RV, (DOI 04-16-22) work for [employer] and the patient reports she has returned to work and the wrist pain has gotten worst. Se says she is on light duty but any time she tries to  something the pain increases. She also reports pain in the middle of her palm; likening it to a round object with a slight point being pressed inside her hand. She is taking the IB and using warm, soaks as prescribed. Current pain is a 6 now that she is at work and 8 if she is trying to  something.LRC     ROS     Objective:      Physical Exam    Assessment:       No diagnosis found.    Plan:                   No follow-ups on file.

## 2022-05-09 NOTE — PROGRESS NOTES
Subjective:       Patient ID: Thelma Roman is a 26 y.o. female.    Chief Complaint: No chief complaint on file.    Pt. Is here today for a W/C F/U of her Rt. Wrist DOI: 4/16/22.  Pt. States that her pain is a 4.  She is continuing to have pain increase while she is working.  She is taking Ibuprofen as needed and doing warm soaks.  She has had one visit with PT that was very painful.  She is having numbness and tingiling in her hand.  LW      PATIENT HAS BEEN WEARING THE WRIST SPLINT AS INSTRUCTED AND REPORTS SOME FRUSTRATION THAT THEY ARE REQUESTING HER AT WORK TO USE HER RIGHT HAND AT TIMES AND INCREASING HER WORKLOAD AND RECEIVING AREA.  WILL REITERATE BASED ON EXAM AND SUBJECTIVE FINDINGS AS WELL AS RECOMMENDATIONS OF THE PHYSICAL THERAPIST THAT SHE IS NOT TO USE HER RIGHT HAND WHILE WORK.  SHE WILL CONTINUE IBUPROFEN, ICE, ELEVATION AND WILL CONTINUE PHYSICAL THERAPY AS SCHEDULED.  DUE TO PERSISTENT PAIN 3-4 WEEKS AFTER INJURY AND PRIOR SURGERY OF THE RIGHT WRIST, WILL GET MRI TO RULE OUT INTERNAL DAMAGE, LIGAMENTOUS VERSUS TENDERNESS VERSUS OCCULT FRACTURE NOT SEEN ON X-RAY.  THIS WILL BE RATE LIMITING FACTOR MOVING FORWARD.  SHE WILL RETURN TO CLINIC IN 2 WEEKS GIVING TIME FOR CONTINUED PHYSICAL THERAPY AS WELL AS TIME FOR AUTHORIZATION AND SCHEDULING OF PERFORMING AN INTERPRETATION OF MRI OF THE RIGHT WRIST.    ROS    Constitution: Negative for chills and fever.   Cardiovascular: Negative for chest pain.   Respiratory: Negative for cough and shortness of breath.    Musculoskeletal: Positive for pain and joint pain.   Skin: Negative for erythema.   Neurological: Positive for numbness and tingling.        Objective:      Physical Exam  Musculoskeletal:      Right wrist: Tenderness present. No swelling, deformity, effusion, lacerations, bony tenderness, snuff box tenderness or crepitus. Decreased range of motion. Normal pulse.        Arms:       Comments: Patient continues to complain of pain with palpation  in all range of motion testing of her right wrist.  She has no evidence of swelling or ecchymosis in this region.  Her vascular inflow including radial pulses intact.  She has pain with minimal crepitus on flexion extension and rotation of the right wrist.  Pain radiates from her right wrist to her right forearm at this time with both palpation and range of motion testing.   Skin:     Findings: No erythema.         Assessment:       1. Sprain of right wrist, subsequent encounter    2. Right wrist pain        Plan:       PATIENT HAS BEEN WEARING THE WRIST SPLINT AS INSTRUCTED AND REPORTS SOME FRUSTRATION THAT THEY ARE REQUESTING HER AT WORK TO USE HER RIGHT HAND AT TIMES AND INCREASING HER WORKLOAD AND RECEIVING AREA.  WILL REITERATE BASED ON EXAM AND SUBJECTIVE FINDINGS AS WELL AS RECOMMENDATIONS OF THE PHYSICAL THERAPIST THAT SHE IS NOT TO USE HER RIGHT HAND WHILE WORK.  SHE WILL CONTINUE IBUPROFEN, ICE, ELEVATION AND WILL CONTINUE PHYSICAL THERAPY AS SCHEDULED.  DUE TO PERSISTENT PAIN 3-4 WEEKS AFTER INJURY AND PRIOR SURGERY OF THE RIGHT WRIST, WILL GET MRI TO RULE OUT INTERNAL DAMAGE, LIGAMENTOUS VERSUS TENDERNESS VERSUS OCCULT FRACTURE NOT SEEN ON X-RAY.  THIS WILL BE RATE LIMITING FACTOR MOVING FORWARD.  SHE WILL RETURN TO CLINIC IN 2 WEEKS GIVING TIME FOR CONTINUED PHYSICAL THERAPY AS WELL AS TIME FOR AUTHORIZATION AND SCHEDULING OF PERFORMING AN INTERPRETATION OF MRI OF THE RIGHT WRIST.       Patient Instructions: Attention not to aggravate affected area, Elevated affected area, Apply ice 24-48 hours then apply heat/warm soaks, Continue Physical Therapy, MRI to be scheduled once authorized   Restrictions: Limited use of right hand and arm (ALLOW TO WEAR BRACE AT WORK. NO USE OF THE RIGHT HAND AND ARM AT WORK)  Follow up in about 2 weeks (around 5/23/2022).

## 2022-05-12 ENCOUNTER — DOCUMENTATION ONLY (OUTPATIENT)
Dept: REHABILITATION | Facility: HOSPITAL | Age: 27
End: 2022-05-12
Payer: MEDICAID

## 2022-05-12 DIAGNOSIS — M25.639 DECREASED RANGE OF MOTION OF WRIST, UNSPECIFIED LATERALITY: ICD-10-CM

## 2022-05-12 DIAGNOSIS — R29.898 WRIST WEAKNESS: ICD-10-CM

## 2022-05-12 DIAGNOSIS — M25.531 WRIST PAIN, ACUTE, RIGHT: Primary | ICD-10-CM

## 2022-05-12 NOTE — PROGRESS NOTES
Outpatient Therapy Compliance Update     Name: Thelma Roman  Clinic Number: [id number]    Therapy Diagnosis:   Encounter Diagnoses   Name Primary?    Wrist pain, acute, right Yes    Decreased range of motion of wrist, unspecified laterality     Wrist weakness      Physician: Eben Thomas MD     Care Update      Today Thelma Roman no-showed to his/her Physical Therapy appointment.   Thelma Roman was contacted regarding missed appointment.    Date of next scheduled appointment: 5/16/2022    Joni Adame PT

## 2022-05-16 ENCOUNTER — CLINICAL SUPPORT (OUTPATIENT)
Dept: REHABILITATION | Facility: HOSPITAL | Age: 27
End: 2022-05-16
Payer: MEDICAID

## 2022-05-16 DIAGNOSIS — M25.531 WRIST PAIN, ACUTE, RIGHT: Primary | ICD-10-CM

## 2022-05-16 DIAGNOSIS — M25.639 DECREASED RANGE OF MOTION OF WRIST, UNSPECIFIED LATERALITY: ICD-10-CM

## 2022-05-16 DIAGNOSIS — R29.898 WRIST WEAKNESS: ICD-10-CM

## 2022-05-16 PROCEDURE — 97110 THERAPEUTIC EXERCISES: CPT

## 2022-05-16 NOTE — PROGRESS NOTES
"  Physical Therapy Daily Treatment Note     Name: Thelma Roman  Clinic Number: [id number]    Therapy Diagnosis:   Encounter Diagnoses   Name Primary?    Wrist pain, acute, right Yes    Decreased range of motion of wrist, unspecified laterality     Wrist weakness      Physician: Eben Thomas MD    Visit Date: 5/16/2022    Physician Orders: PT Eval and Treat   Medical Diagnosis from Referral:   S63.501D (ICD-10-CM) - Sprain of right wrist, subsequent encounter   M25.531 (ICD-10-CM) - Pain, wrist, right   Evaluation Date: 5/6/2022  Authorization Period Expiration: 5/2/2023  Plan of Care Expiration: 7/28/2022  Progress Note Due: 6/6/2022  Visit # / Visits authorized: 2/ 9   FOTO: 2/5        Time In: 7:13 ( late arrival)   Time Out: 7:38 am   Total Billable Time: 25 minutes    Precautions: Standard    Subjective     Pt reports: that her (R) wrist is feeling about the same. Pt stated that she is not currently experiencing pain in (R) wrist, but is having spasms/muscle twitches in (R) forearm that just started since sitting in waiting room. Pt stated this twitching happens occasionally, but not often.  While taking subjective report pt stated that inside of her (R) wrist was starting to hurt.   She was not issued compliant with home exercise program.  Response to previous treatment: last session was initial evaluation   Functional change: none    Pain: 0/10 (R) wrist initially, 3/10 (R) wrist after taking subjective.   Location: right wrist       Objective     Thelma received the following manual therapy techniques: for 2 minutes, including:  Gentle STM to (R) anterior forearm and pronator teres -  Then Held     Thelma received therapeutic exercises to develop ROM and flexibility for 23 minutes including:  Gentle wrist flexion stretch 2x30" - then held   Gentle wrist extension stretch 4x30"   Pronator terse stretch- held   median nerve glides - held   Tendon glides 1x10 ea  A and B   Radial/ulnar deviation 1x10 " ea        Home Exercises Provided and Patient Education Provided     Education provided:   - HEP     Written Home Exercises Provided: yes.  Exercises were reviewed and Thelma was able to demonstrate them prior to the end of the session.  Thelma demonstrated good  understanding of the education provided.     See EMR under Patient Instructions for exercises provided 5/16/2022.      Assessment     Pt with fair tolerance to therapy session today. Attempted to perform gentle STM to anterior (R) forearm and pronator teres muscle, but was held due to pt report of experiencing numbness in 2nd and 3rd fingers of (R) hand and difficulty tolerating due to report of tenderness in (R) pronator teres muscle. Wrist extension stretch held due to pt report of experiencing numbness in 2nd finger. Pronator teres stretch was held due to pt report of experiencing numbness in 2nd and 3rd fingers. Median nerve glides were held due to pt report of experiencing pain in (R) wrist and increased numbness in fingers of (R) hand. After performing therex performed above pt reported that her (R) UE felt very tired and generally uncomfortable, therefore no additional therex was added today. Pt may benefit from dry needling if available next visit.   Thelma Is progressing towards her goals.   Pt prognosis is Good.     Pt will continue to benefit from skilled outpatient physical therapy to address the deficits listed in the problem list box on initial evaluation, provide pt/family education and to maximize pt's level of independence in the home and community environment.     Pt's spiritual, cultural and educational needs considered and pt agreeable to plan of care and goals.    Anticipated barriers to physical therapy: work load at her job    Goals:     Short Term Goals (2 Weeks):   1. Pt will be independent with HEP to supplement PT in improving functional use of R UE.     Long Term Goals (6 Weeks):   1. 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  2. Pt will increase R Wrist AROM to WNL in all planes to improve functional use of R RUE.  3. Pt will increase R Wrist PROM to WFL in all planes to improve functional use of R RUE.  4. Pt will improve R Wrist MMTs to = L Wrist to promote equal use of B UEs in performing functional tasks.  5. Pt to report pain </= 0/10 with ADLs and IADLs using R Wrist to improve functional QOL.    Plan     Continue per POC, progress as tolerated     Shabana Oliveira, PT

## 2022-05-17 NOTE — LETTER
July 2, 2019      [facility]  [address]  [facility] [address]  Phone: [phone]  Fax: [phone]       Patient: Thelma Roman   YOB: 1995  Date of Visit: 07/02/2019    To Whom It May Concern:    PING Roman  was at [facility] on 07/02/2019. She may return to work/school on 07/05/2019 with no restrictions. If you have any questions or concerns, or if I can be of further assistance, please do not hesitate to contact me.    Sincerely,      Selene Daugherty PA-C     
4

## 2022-05-18 ENCOUNTER — HOSPITAL ENCOUNTER (EMERGENCY)
Facility: HOSPITAL | Age: 27
Discharge: HOME | End: 2022-05-18
Attending: EMERGENCY MEDICINE
Payer: MEDICAID

## 2022-05-18 VITALS
TEMPERATURE: 98 F | SYSTOLIC BLOOD PRESSURE: 112 MMHG | HEART RATE: 94 BPM | HEIGHT: 69 IN | DIASTOLIC BLOOD PRESSURE: 70 MMHG | WEIGHT: 210 LBS | OXYGEN SATURATION: 100 % | RESPIRATION RATE: 19 BRPM | BODY MASS INDEX: 31.1 KG/M2

## 2022-05-18 DIAGNOSIS — R05.9 COUGH: ICD-10-CM

## 2022-05-18 DIAGNOSIS — R00.0 TACHYCARDIA: ICD-10-CM

## 2022-05-18 DIAGNOSIS — B34.9 VIRAL SYNDROME: ICD-10-CM

## 2022-05-18 DIAGNOSIS — B34.9 VIRAL ILLNESS: ICD-10-CM

## 2022-05-18 DIAGNOSIS — E86.0 DEHYDRATION: Primary | ICD-10-CM

## 2022-05-18 LAB
ALBUMIN SERPL BCP-MCNC: 4.6 G/DL (ref 3.5–5.2)
ALP SERPL-CCNC: 96 U/L (ref 55–135)
ALT SERPL W/O P-5'-P-CCNC: 12 U/L (ref 10–44)
ANION GAP SERPL CALC-SCNC: 12 MMOL/L (ref 8–16)
AST SERPL-CCNC: 16 U/L (ref 10–40)
B-HCG UR QL: NEGATIVE
BACTERIA #/AREA URNS HPF: NORMAL /HPF
BASOPHILS # BLD AUTO: 0.06 K/UL (ref 0–0.2)
BASOPHILS NFR BLD: 0.7 % (ref 0–1.9)
BILIRUB SERPL-MCNC: 0.3 MG/DL (ref 0.1–1)
BILIRUB UR QL STRIP: NEGATIVE
BUN SERPL-MCNC: 12 MG/DL (ref 6–20)
CALCIUM SERPL-MCNC: 10.5 MG/DL (ref 8.7–10.5)
CHLORIDE SERPL-SCNC: 104 MMOL/L (ref 95–110)
CLARITY UR: CLEAR
CO2 SERPL-SCNC: 24 MMOL/L (ref 23–29)
COLOR UR: YELLOW
CREAT SERPL-MCNC: 0.9 MG/DL (ref 0.5–1.4)
CTP QC/QA: YES
DIFFERENTIAL METHOD: NORMAL
EOSINOPHIL # BLD AUTO: 0.1 K/UL (ref 0–0.5)
EOSINOPHIL NFR BLD: 0.9 % (ref 0–8)
ERYTHROCYTE [DISTWIDTH] IN BLOOD BY AUTOMATED COUNT: 12.6 % (ref 11.5–14.5)
EST. GFR  (AFRICAN AMERICAN): >60 ML/MIN/1.73 M^2
EST. GFR  (NON AFRICAN AMERICAN): >60 ML/MIN/1.73 M^2
GLUCOSE SERPL-MCNC: 90 MG/DL (ref 70–110)
GLUCOSE UR QL STRIP: NEGATIVE
HCT VFR BLD AUTO: 42.6 % (ref 37–48.5)
HETEROPH AB SERPL QL IA: NEGATIVE
HGB BLD-MCNC: 14.3 G/DL (ref 12–16)
HGB UR QL STRIP: ABNORMAL
HYALINE CASTS #/AREA URNS LPF: 1 /LPF
IMM GRANULOCYTES # BLD AUTO: 0.02 K/UL (ref 0–0.04)
IMM GRANULOCYTES NFR BLD AUTO: 0.2 % (ref 0–0.5)
INFLUENZA A, MOLECULAR: NEGATIVE
INFLUENZA B, MOLECULAR: NEGATIVE
KETONES UR QL STRIP: NEGATIVE
LEUKOCYTE ESTERASE UR QL STRIP: NEGATIVE
LIPASE SERPL-CCNC: 19 U/L (ref 4–60)
LYMPHOCYTES # BLD AUTO: 2.9 K/UL (ref 1–4.8)
LYMPHOCYTES NFR BLD: 31.3 % (ref 18–48)
MCH RBC QN AUTO: 29.2 PG (ref 27–31)
MCHC RBC AUTO-ENTMCNC: 33.6 G/DL (ref 32–36)
MCV RBC AUTO: 87 FL (ref 82–98)
MICROSCOPIC COMMENT: NORMAL
MONOCYTES # BLD AUTO: 0.7 K/UL (ref 0.3–1)
MONOCYTES NFR BLD: 7.4 % (ref 4–15)
NEUTROPHILS # BLD AUTO: 5.5 K/UL (ref 1.8–7.7)
NEUTROPHILS NFR BLD: 59.5 % (ref 38–73)
NITRITE UR QL STRIP: NEGATIVE
NRBC BLD-RTO: 0 /100 WBC
PH UR STRIP: 5 [PH] (ref 5–8)
PLATELET # BLD AUTO: 337 K/UL (ref 150–450)
PMV BLD AUTO: 10.5 FL (ref 9.2–12.9)
POTASSIUM SERPL-SCNC: 4.4 MMOL/L (ref 3.5–5.1)
PROT SERPL-MCNC: 8.3 G/DL (ref 6–8.4)
PROT UR QL STRIP: ABNORMAL
RBC # BLD AUTO: 4.89 M/UL (ref 4–5.4)
RBC #/AREA URNS HPF: 2 /HPF (ref 0–4)
SARS-COV-2 RDRP RESP QL NAA+PROBE: NEGATIVE
SODIUM SERPL-SCNC: 140 MMOL/L (ref 136–145)
SP GR UR STRIP: >1.03 (ref 1–1.03)
SPECIMEN SOURCE: NORMAL
SQUAMOUS #/AREA URNS HPF: 8 /HPF
URN SPEC COLLECT METH UR: ABNORMAL
UROBILINOGEN UR STRIP-ACNC: NEGATIVE EU/DL
WBC # BLD AUTO: 9.22 K/UL (ref 3.9–12.7)
WBC #/AREA URNS HPF: 1 /HPF (ref 0–5)

## 2022-05-18 PROCEDURE — 93010 ELECTROCARDIOGRAM REPORT: CPT | Mod: ,,, | Performed by: INTERNAL MEDICINE

## 2022-05-18 PROCEDURE — 96361 HYDRATE IV INFUSION ADD-ON: CPT | Mod: 59

## 2022-05-18 PROCEDURE — 80053 COMPREHEN METABOLIC PANEL: CPT | Performed by: PHYSICIAN ASSISTANT

## 2022-05-18 PROCEDURE — 85025 COMPLETE CBC W/AUTO DIFF WBC: CPT | Performed by: PHYSICIAN ASSISTANT

## 2022-05-18 PROCEDURE — 93005 ELECTROCARDIOGRAM TRACING: CPT

## 2022-05-18 PROCEDURE — 81025 URINE PREGNANCY TEST: CPT | Performed by: PHYSICIAN ASSISTANT

## 2022-05-18 PROCEDURE — 96374 THER/PROPH/DIAG INJ IV PUSH: CPT

## 2022-05-18 PROCEDURE — 93010 EKG 12-LEAD: ICD-10-PCS | Mod: ,,, | Performed by: INTERNAL MEDICINE

## 2022-05-18 PROCEDURE — 63600175 PHARM REV CODE 636 W HCPCS: Performed by: EMERGENCY MEDICINE

## 2022-05-18 PROCEDURE — 81000 URINALYSIS NONAUTO W/SCOPE: CPT | Performed by: PHYSICIAN ASSISTANT

## 2022-05-18 PROCEDURE — 86308 HETEROPHILE ANTIBODY SCREEN: CPT | Performed by: EMERGENCY MEDICINE

## 2022-05-18 PROCEDURE — 83690 ASSAY OF LIPASE: CPT | Performed by: PHYSICIAN ASSISTANT

## 2022-05-18 PROCEDURE — 25000003 PHARM REV CODE 250: Performed by: EMERGENCY MEDICINE

## 2022-05-18 PROCEDURE — 87502 INFLUENZA DNA AMP PROBE: CPT | Performed by: PHYSICIAN ASSISTANT

## 2022-05-18 PROCEDURE — U0002 COVID-19 LAB TEST NON-CDC: HCPCS | Performed by: NURSE PRACTITIONER

## 2022-05-18 PROCEDURE — 99285 EMERGENCY DEPT VISIT HI MDM: CPT | Mod: 25

## 2022-05-18 RX ORDER — SODIUM CHLORIDE 9 MG/ML
1000 INJECTION, SOLUTION INTRAVENOUS
Status: COMPLETED | OUTPATIENT
Start: 2022-05-18 | End: 2022-05-18

## 2022-05-18 RX ORDER — DICYCLOMINE HYDROCHLORIDE 20 MG/1
20 TABLET ORAL 2 TIMES DAILY PRN
Qty: 20 TABLET | Refills: 0 | Status: SHIPPED | OUTPATIENT
Start: 2022-05-18 | End: 2022-08-26

## 2022-05-18 RX ORDER — BENZONATATE 100 MG/1
100 CAPSULE ORAL 3 TIMES DAILY PRN
Qty: 20 CAPSULE | Refills: 0 | Status: SHIPPED | OUTPATIENT
Start: 2022-05-18 | End: 2022-08-26

## 2022-05-18 RX ORDER — METOCLOPRAMIDE HYDROCHLORIDE 5 MG/ML
10 INJECTION INTRAMUSCULAR; INTRAVENOUS
Status: COMPLETED | OUTPATIENT
Start: 2022-05-18 | End: 2022-05-18

## 2022-05-18 RX ORDER — PROMETHAZINE HYDROCHLORIDE 25 MG/1
25 TABLET ORAL EVERY 6 HOURS PRN
Qty: 15 TABLET | Refills: 0 | Status: SHIPPED | OUTPATIENT
Start: 2022-05-18 | End: 2022-08-26

## 2022-05-18 RX ADMIN — SODIUM CHLORIDE 1000 ML: 0.9 INJECTION, SOLUTION INTRAVENOUS at 02:05

## 2022-05-18 RX ADMIN — METOCLOPRAMIDE 10 MG: 5 INJECTION, SOLUTION INTRAMUSCULAR; INTRAVENOUS at 02:05

## 2022-05-18 NOTE — FIRST PROVIDER EVALUATION
Emergency Department TeleTriage Encounter Note      CHIEF COMPLAINT    Chief Complaint   Patient presents with    Fever     Pt c/o fever, weakness and abdominal pain onset yesterday. Pt also reports fatigue.       VITAL SIGNS   Initial Vitals [05/18/22 1256]   BP Pulse Resp Temp SpO2   (!) 123/91 (!) 139 18 98.2 °F (36.8 °C) 98 %      MAP       --            ALLERGIES    Review of patient's allergies indicates:  No Known Allergies    PROVIDER TRIAGE NOTE  This is a teletriage evaluation of a 26 y.o. female presenting to the ED complaining of fever yesterday, elevated heart rate, fatigue and nausea.  No vomiting or diarrhea.  No abdominal pain.      Initial orders will be placed and care will be transferred to an alternate provider when patient is roomed for a full evaluation. Any additional orders and the final disposition will be determined by that provider.           ORDERS  Labs Reviewed   INFLUENZA A & B BY MOLECULAR   CBC W/ AUTO DIFFERENTIAL   COMPREHENSIVE METABOLIC PANEL   LIPASE   URINALYSIS, REFLEX TO URINE CULTURE   SARS-COV-2 RDRP GENE   POCT URINE PREGNANCY       ED Orders (720h ago, onward)    Start Ordered     Status Ordering Provider    05/18/22 1306 05/18/22 1305  Influenza A & B by Molecular  Once         Ordered CARMEN VAZQUEZ S.    05/18/22 1306 05/18/22 1305  POCT COVID-19 Rapid Screening  Once         Ordered CARMEN VAZQUEZ S.    05/18/22 1306 05/18/22 1305  Urinalysis, Reflex to Urine Culture Urine, Clean Catch  STAT         Ordered CARMEN VAZQUEZ S.    05/18/22 1306 05/18/22 1305  POCT urine pregnancy  Once         Ordered CARMEN VAZQUEZ S.    05/18/22 1305 05/18/22 1305  Saline lock IV  Once         Ordered CARMEN VAZQUEZ S.    05/18/22 1305 05/18/22 1305  CBC auto differential  STAT         Ordered CARMEN VAZQUEZ S.    05/18/22 1305 05/18/22 1305  Comprehensive metabolic panel  STAT         Ordered CARMEN VAZQUEZ S.    05/18/22 1305 05/18/22 1305  Lipase  STAT          Ordered CARMEN VAZQUEZ            Virtual Visit Note: The provider triage portion of this emergency department evaluation and documentation was performed via [employer], a HIPAA-compliant telemedicine application, in concert with a tele-presenter in the room. A face to face patient evaluation with one of my colleagues will occur once the patient is placed in an emergency department room.      DISCLAIMER: This note was prepared with [employer] voice recognition transcription software. Garbled syntax, mangled pronouns, and other bizarre constructions may be attributed to that software system.

## 2022-05-18 NOTE — ED PROVIDER NOTES
Encounter Date: 5/18/2022    SCRIBE #1 NOTE: I, Jessi Naik, am scribing for, and in the presence of, Richard Archer MD.       History     Chief Complaint   Patient presents with    Fever     Pt c/o fever, weakness and abdominal pain onset yesterday. Pt also reports fatigue.     Patient is a 26-year-old female with a PMHx of Acute appendicitis (10/30/2020), Anxiety, Chronic pain of right knee (11/18/2018), Depression, and Panic disorder without agoraphobia (9/16/2015) who presents to the ED with complaint of fever (around 100), abdominal pain, and fatigue that started yesterday. She also notes elevated heart rate in the 130s. Other symptoms include productive cough with phlegm, mild shortness of breath, diarrhea (3 episodes yesterday) and nausea associated with abdominal pain. She denies eating any abnormal or unusual foods. She has not taken any medications for her symptoms. She notes history of chronic knee pain denies any new or worsening pain. She denies any sore throat, chest pain, vomiting, blood in stool, rashes, or skin changes. Patient reports sick contact at home who is experiencing similar symptoms. Her LMP was one week ago. She denies any known allergies to medications. Patient denies history of Covid and is vaccinated for it.  Furthermore, patient denies any history of venous thromboembolism; specifically, she denies any history exogenous estrogen use, hemoptysis, unilateral leg swelling, recent surgery or prolonged travel; denies any history of clotting disorders.     The history is provided by the patient. No  was used.     Review of patient's allergies indicates:  No Known Allergies  Past Medical History:   Diagnosis Date    Acute appendicitis 10/30/2020    Anxiety     Chronic pain of right knee 11/18/2018    Depression     Panic disorder without agoraphobia 9/16/2015     Past Surgical History:   Procedure Laterality Date    CARPAL TUNNEL RELEASE      FACIAL  RECONSTRUCTION SURGERY      LAPAROSCOPIC APPENDECTOMY N/A 10/31/2020    Procedure: APPENDECTOMY, LAPAROSCOPIC;  Surgeon: Steve Saucedo Jr., MD;  Location: [facility];  Service: General;  Laterality: N/A;    TONSILLECTOMY       Family History   Problem Relation Age of Onset    No Known Problems Mother     No Known Problems Father      Social History     Tobacco Use    Smoking status: Never Smoker    Smokeless tobacco: Never Used   Substance Use Topics    Alcohol use: No    Drug use: No     Review of Systems   Constitutional: Positive for activity change, fatigue and fever.   HENT: Negative for sore throat.    Eyes: Negative for visual disturbance.   Respiratory: Positive for cough and shortness of breath.    Cardiovascular: Negative for chest pain.   Gastrointestinal: Positive for abdominal pain, diarrhea and nausea. Negative for blood in stool and vomiting.   Musculoskeletal: Positive for arthralgias.   Skin: Negative for color change and rash.   Neurological: Positive for weakness.       Physical Exam     Initial Vitals [05/18/22 1256]   BP Pulse Resp Temp SpO2   (!) 123/91 (!) 139 18 98.2 °F (36.8 °C) 98 %      MAP       --         Physical Exam    Nursing note and vitals reviewed.  Constitutional: She appears well-developed and well-nourished. No distress.   Non toxic   No acute distress    HENT:   Head: Normocephalic and atraumatic.   Right Ear: External ear normal.   Left Ear: External ear normal.   Dry mucous membranes   Eyes: Conjunctivae and EOM are normal. Pupils are equal, round, and reactive to light.   Neck:   Normal range of motion.  Cardiovascular: Regular rhythm, normal heart sounds and intact distal pulses.   Tachycardic   Pulmonary/Chest: Breath sounds normal. No respiratory distress. She has no wheezes. She has no rhonchi. She has no rales.   Abdominal: Abdomen is soft. Bowel sounds are normal.   Abdomen is soft. There is no rebound or guarding. Normal bowel sounds in all four quadrants.  Negative Torres's sign. Negative McBurney's point tenderness Negative heel tap. No masses, fluid wave or other abnormality noted. No rebound or guarding tenderness. No ecchymosis or other skin changes appreciated on physical exam.      Musculoskeletal:         General: No tenderness or edema. Normal range of motion.      Cervical back: Normal range of motion.      Comments: Brace located to right forearm     Neurological: She is alert and oriented to person, place, and time. GCS score is 15. GCS eye subscore is 4. GCS verbal subscore is 5. GCS motor subscore is 6.   Skin: Skin is warm and dry. Capillary refill takes less than 2 seconds.   Psychiatric: She has a normal mood and affect. Her behavior is normal. Judgment and thought content normal.         ED Course   Procedures  Labs Reviewed   URINALYSIS, REFLEX TO URINE CULTURE - Abnormal; Notable for the following components:       Result Value    Specific Gravity, UA >1.030 (*)     Protein, UA Trace (*)     Occult Blood UA 2+ (*)     All other components within normal limits    Narrative:     Specimen Source->Urine   INFLUENZA A & B BY MOLECULAR   CBC W/ AUTO DIFFERENTIAL   COMPREHENSIVE METABOLIC PANEL   LIPASE   SARS-COV-2 RNA AMPLIFICATION, QUAL   URINALYSIS MICROSCOPIC    Narrative:     Specimen Source->Urine   HETEROPHILE AB SCREEN   POCT URINE PREGNANCY     EKG Readings: (Independently Interpreted)   Initial Reading: No STEMI. Rhythm: Sinus Tachycardia. Heart Rate: 106. Ectopy: No Ectopy. ST Segments: Normal ST Segments. T Waves: Normal.   Sinus tachycardia; no ischemic change      ECG Results          EKG 12-lead (In process)  Result time 05/18/22 14:51:03    In process by Interface, Lab In [facility] (05/18/22 14:51:03)                 Narrative:    Test Reason : R00.0,    Vent. Rate : 106 BPM     Atrial Rate : 106 BPM     P-R Int : 146 ms          QRS Dur : 078 ms      QT Int : 354 ms       P-R-T Axes : 037 052 018 degrees     QTc Int : 470 ms    Sinus  tachycardia  Otherwise normal ECG  No previous ECGs available    Referred By: [email or web address]   SELF           Confirmed By:                             Imaging Results          X-Ray Chest PA And Lateral (Final result)  Result time 05/18/22 14:38:59    Final result by Khai Craft MD (05/18/22 14:38:59)                 Impression:      No radiographic evidence of pneumonia or other source of cough, noting that early/mild viral pneumonia may be radiographically occult.      Electronically signed by: Khai Craft MD  Date:    05/18/2022  Time:    14:38             Narrative:    EXAMINATION:  XR CHEST PA AND LATERAL    CLINICAL HISTORY:  Cough, unspecified    TECHNIQUE:  PA and lateral views of the chest were performed.    COMPARISON:  None    FINDINGS:  The lungs are symmetrically well expanded and clear, with normal appearance of pulmonary vasculature and no pleural effusion or pneumothorax.    The cardiac silhouette is normal in size. The hilar and mediastinal contours are unremarkable.    Bones are intact.                                 Medications   0.9%  NaCl infusion (0 mLs Intravenous Stopped 5/18/22 1614)   metoclopramide HCl injection 10 mg (10 mg Intravenous Given 5/18/22 1422)     Medical Decision Making:   Clinical Tests:   Lab Tests: Ordered and Reviewed  Radiological Study: Ordered and Reviewed  Medical Tests: Ordered and Reviewed  ED Management:  - pt mildly tachycardic on arrival; VS otherwise unremarkable  - CXR demonstrates no acute cardiopulmonary process per my interpretation, final radiology read  - laboratory analysis unremarkable; specific gravity of urine somewhat elevated however; patient administered IV fluids, Reglan with near complete resolution of her symptoms; patient able tolerate p.o. prior to discharge; will discharge patient home at this time with prescription for symptomatic care of likely dehydration, viral illness  - pt encouraged to stay well-hydrated, eat bland foods   - pt states  she is comfortable with plan for discharge at this time   - No further intervention is indicated at this time after having taken into account the patient's history, physical exam findings, and empirical and objective data obtained during the patient's emergency department workup.   - The patient is at low risk for an emergent medical condition at this time, and I am of the belief that that it is safe to discharge the patient from the emergency department.   - The patient is instructed to follow up as outpatient as indicated on the discharge paperwork.    - I have discussed the specifics of the workup with the patient and the patient has verbalized understanding of the details of the workup, the diagnosis, the treatment plan, and the need for outpatient follow-up.    - Although the patient has no emergent etiology today this does not preclude the development of an emergent condition so, in addition, I have advised the patient that they can return to the ED and/or activate EMS at any time with worsening of their symptoms, change of their symptoms, or with any other medical complaint.    - The patient remained comfortable and stable during their visit in the ED.    - Discharge and follow-up instructions discussed with the patient who expressed understanding and willingness to comply with my recommendations.  - Results of all emergency department tests  discussed thoroughly with patient; all patient questions answered; pt in agreement with plan  - Pt instructed to follow up with PCP in 2-3 days for recheck of today's complaints  - Pt given strict emergency department return precautions for any new or worsening of symptoms  - Pt discharged from the emergency department in stable condition, in no acute distress            Scribe Attestation:   Scribe #1: I performed the above scribed service and the documentation accurately describes the services I performed. I attest to the accuracy of the note.        ED Course as of  05/18/22 1728   Wed May 18, 2022   1523 On reassessment, patient resting comfortably in bed no acute distress noted.  Patient reports improvement of symptoms of fatigue, abdominal cramping and nausea following administration of IV fluids, Reglan [LC]      ED Course User Index  [LC] Richard Archer MD             Clinical Impression:   Final diagnoses:  [R00.0] Tachycardia  [R05.9] Cough  [E86.0] Dehydration (Primary)  [B34.9] Viral illness  [B34.9] Viral syndrome          ED Disposition Condition    Discharge Stable        ED Prescriptions     Medication Sig Dispense Start Date End Date Auth. Provider    promethazine (PHENERGAN) 25 MG tablet Take 1 tablet (25 mg total) by mouth every 6 (six) hours as needed for Nausea. 15 tablet 5/18/2022  Richard Archer MD    dicyclomine (BENTYL) 20 mg tablet Take 1 tablet (20 mg total) by mouth 2 (two) times daily as needed (abdominal cramping). 20 tablet 5/18/2022  Richard Archer MD    benzonatate (TESSALON) 100 MG capsule Take 1 capsule (100 mg total) by mouth 3 (three) times daily as needed for Cough. 20 capsule 5/18/2022  Richard Archer MD        Follow-up Information     Follow up With Specialties Details Why Contact Info    Amy Molina, DO Family Medicine, Internal Medicine Schedule an appointment as soon as possible for a visit   [address] [place] [address]  [id number] [place] [phone]        I, Richard Archer,  personally performed the services described in this documentation. All medical record entries made by the scribe were at my direction and in my presence.  I have reviewed the chart and agree that the record reflects my personal performance and is accurate and complete. Richard Archer M.D. 5:27 PM05/18/2022       Richard Archer MD  05/18/22 1728

## 2022-05-18 NOTE — Clinical Note
"Thelma Roman (Ashley) was seen and treated in our emergency department on 5/18/2022.  She may return to work on 05/21/2022.       If you have any questions or concerns, please don't hesitate to call.       RN    "

## 2022-05-19 ENCOUNTER — DOCUMENTATION ONLY (OUTPATIENT)
Dept: REHABILITATION | Facility: HOSPITAL | Age: 27
End: 2022-05-19
Payer: MEDICAID

## 2022-05-23 ENCOUNTER — OFFICE VISIT (OUTPATIENT)
Dept: URGENT CARE | Facility: CLINIC | Age: 27
End: 2022-05-23
Payer: COMMERCIAL

## 2022-05-23 ENCOUNTER — CLINICAL SUPPORT (OUTPATIENT)
Dept: REHABILITATION | Facility: HOSPITAL | Age: 27
End: 2022-05-23
Payer: MEDICAID

## 2022-05-23 DIAGNOSIS — M25.531 RIGHT WRIST PAIN: ICD-10-CM

## 2022-05-23 DIAGNOSIS — M25.531 WRIST PAIN, ACUTE, RIGHT: Primary | ICD-10-CM

## 2022-05-23 DIAGNOSIS — R29.898 WRIST WEAKNESS: ICD-10-CM

## 2022-05-23 DIAGNOSIS — M25.631 DECREASED RANGE OF MOTION OF RIGHT WRIST: ICD-10-CM

## 2022-05-23 DIAGNOSIS — S63.501D SPRAIN OF RIGHT WRIST, SUBSEQUENT ENCOUNTER: Primary | ICD-10-CM

## 2022-05-23 DIAGNOSIS — Z02.6 ENCOUNTER RELATED TO WORKER'S COMPENSATION CLAIM: ICD-10-CM

## 2022-05-23 PROCEDURE — 97110 THERAPEUTIC EXERCISES: CPT | Mod: CQ

## 2022-05-23 PROCEDURE — 99214 OFFICE O/P EST MOD 30 MIN: CPT | Mod: S$GLB,,,

## 2022-05-23 PROCEDURE — 99214 PR OFFICE/OUTPT VISIT, EST, LEVL IV, 30-39 MIN: ICD-10-PCS | Mod: S$GLB,,,

## 2022-05-23 PROCEDURE — 97140 MANUAL THERAPY 1/> REGIONS: CPT | Mod: CQ

## 2022-05-23 NOTE — PROGRESS NOTES
"  Physical Therapy Daily Treatment Note     Name: Thelma Roman  Clinic Number: [id number]    Therapy Diagnosis:   No diagnosis found.  Physician: Eben Thomas MD    Visit Date: 5/23/2022    Physician Orders: PT Eval and Treat   Medical Diagnosis from Referral:   S63.501D (ICD-10-CM) - Sprain of right wrist, subsequent encounter   M25.531 (ICD-10-CM) - Pain, wrist, right   Evaluation Date: 5/6/2022  Authorization Period Expiration: 5/2/2023  Plan of Care Expiration: 7/28/2022  Progress Note Due: 6/6/2022  Visit # / Visits authorized: 2/9   FOTO: 2/5        Time In: 7:06 am  Time Out: 7:45 am   Total Billable Time: 39 minutes    Precautions: Standard    Subjective     Pt reports: she feels a little better. She finds that since she recently quit her job the hand and wrist have been feeling better. She can hold things but the longer she holds things the worse it feels. The pain seems to move to different locations top and bottom of the wrist. The outside forearm is the most sensitive.  She wascompliant with home exercise program.  Response to previous treatment: last session was initial evaluation   Functional change: none    Pain: 2/10 (R) wrist initially, 6/10 (R) wrist after tx  Location: right wrist       Objective     Thelma received the following manual therapy techniques: for 15 minutes, including:  Gentle STM to (R) anterior forearm and pronator teres -  Then Held   Dynamic cupping to forearm and wrist   Thelma received therapeutic exercises to develop ROM and flexibility for 23 minutes including:  Gentle wrist flexion stretch 3x30"   Gentle wrist extension stretch 3x30"   Pronator terse stretch- held   median nerve glides x 10 5"  Tendon glides 1x10 ea  A and B   Radial/ulnar deviation 1x10 ea not performed        Home Exercises Provided and Patient Education Provided     Education provided:   - HEP     Written Home Exercises Provided: yes.  Exercises were reviewed and Thelma was able to demonstrate " them prior to the end of the session.  Thelma demonstrated good  understanding of the education provided.     See EMR under Patient Instructions for exercises provided 5/16/2022.      Assessment     Pt with fair tolerance to therapy session today. Started session with trial of MHP to forearm, patient reported the pressure from the heat pack was causing numbness to the 2nd finger, therefore held. Attempted to perform gentle STM to anterior (R) forearm and pronator teres muscle, but was held due to pt report of experiencing numbness in 2nd finger of (R) hand and difficulty tolerating due to report of tenderness in (R) pronator teres muscle she also reported an increase in pain at dorsal side of wrist. Patient's pain   Pt may benefit from dry needling if available next visit.   Thelma Is progressing towards her goals.   Pt prognosis is Good.     Pt will continue to benefit from skilled outpatient physical therapy to address the deficits listed in the problem list box on initial evaluation, provide pt/family education and to maximize pt's level of independence in the home and community environment.     Pt's spiritual, cultural and educational needs considered and pt agreeable to plan of care and goals.    Anticipated barriers to physical therapy: work load at her job    Goals:     Short Term Goals (2 Weeks):   1. Pt will be independent with HEP to supplement PT in improving functional use of R UE.     Long Term Goals (6 Weeks):   1. 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  2. Pt will increase R Wrist AROM to WNL in all planes to improve functional use of R RUE.  3. Pt will increase R Wrist PROM to WFL in all planes to improve functional use of R RUE.  4. Pt will improve R Wrist MMTs to = L Wrist to promote equal use of B UEs in performing functional tasks.  5. Pt to report pain </= 0/10 with ADLs and IADLs using R Wrist to improve  functional QOL.    Plan     Continue per POC, progress as tolerated     Elton Garcia, PTA

## 2022-05-23 NOTE — LETTER
[facility]  [address] [place] [address]  Phone: [phone]  Fax: [phone]  [facility] [employer]: [phone]    Pt Name: Thelma Ko Date: 04/16/2022   Employee ID:  Date of First Treatment: 05/23/2022   Company: [employer]      Appointment Time: 09:45 AM Arrived: 10:00 AM    Provider: Jose Flowers, DO Time Out: 11:25 AM      Office Treatment:   1. Sprain of right wrist, subsequent encounter    2. Encounter related to worker's compensation claim    3. Right wrist pain          Patient Instructions: Continue Physical Therapy      Restrictions: Limited use of right hand and arm (Allow for wrist brace use.)     Return Appointment: 6/6/2022 at 11:00 AM [facility] [place]
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
## 2022-05-25 ENCOUNTER — DOCUMENTATION ONLY (OUTPATIENT)
Dept: REHABILITATION | Facility: HOSPITAL | Age: 27
End: 2022-05-25
Payer: MEDICAID

## 2022-05-25 DIAGNOSIS — M25.531 WRIST PAIN, ACUTE, RIGHT: Primary | ICD-10-CM

## 2022-05-25 DIAGNOSIS — R29.898 WRIST WEAKNESS: ICD-10-CM

## 2022-05-25 DIAGNOSIS — M25.631 DECREASED RANGE OF MOTION OF RIGHT WRIST: ICD-10-CM

## 2022-05-25 NOTE — PROGRESS NOTES
Outpatient Therapy Compliance Update     Name: Thelma Roman  Clinic Number: [id number]    Therapy Diagnosis:   Encounter Diagnoses   Name Primary?    Wrist pain, acute, right Yes    Decreased range of motion of right wrist     Wrist weakness          Care Update      Today Thelma Roman no-showed to his/her Physical Therapy appointment.   Thelma Roman was contacted regarding cancelled appointment, but patient did not answer phone.    Date of next scheduled appointment: June 1st 2022    Joni Adame PT

## 2022-06-01 ENCOUNTER — TELEPHONE (OUTPATIENT)
Dept: REHABILITATION | Facility: HOSPITAL | Age: 27
End: 2022-06-01
Payer: MEDICAID

## 2022-06-03 ENCOUNTER — CLINICAL SUPPORT (OUTPATIENT)
Dept: REHABILITATION | Facility: HOSPITAL | Age: 27
End: 2022-06-03
Payer: MEDICAID

## 2022-06-03 DIAGNOSIS — R29.898 WRIST WEAKNESS: ICD-10-CM

## 2022-06-03 DIAGNOSIS — M25.631 DECREASED RANGE OF MOTION OF RIGHT WRIST: ICD-10-CM

## 2022-06-03 DIAGNOSIS — M25.531 WRIST PAIN, ACUTE, RIGHT: Primary | ICD-10-CM

## 2022-06-03 PROCEDURE — 97110 THERAPEUTIC EXERCISES: CPT

## 2022-06-03 PROCEDURE — 97140 MANUAL THERAPY 1/> REGIONS: CPT

## 2022-06-03 NOTE — PROGRESS NOTES
Physical Therapy Daily Treatment Note     Name: Thelma Roman  Clinic Number: [id number]    Therapy Diagnosis:   Encounter Diagnoses   Name Primary?    Wrist pain, acute, right Yes    Decreased range of motion of right wrist     Wrist weakness      Physician: Eben Thomas MD    Visit Date: 6/3/2022    Physician Orders: PT Eval and Treat   Medical Diagnosis from Referral:   S63.501D (ICD-10-CM) - Sprain of right wrist, subsequent encounter   M25.531 (ICD-10-CM) - Pain, wrist, right   Evaluation Date: 5/6/2022  Authorization Period Expiration: 5/2/2023  Plan of Care Expiration: 7/28/2022  Progress Note Due: 6/6/2022  Visit # / Visits authorized: 4/9   FOTO: 4/5        Time In: 103  Time Out: 145  Total Billable Time: 42 minutes    Precautions: Standard    Subjective     Pt reports: she has had much less pain as of recently, but still gets a sharp pain in the R wrist. Patient reports she got a new job that is less stressful and has noticed her pain is less. Patient reports she thinks that the frequency, duration, and intensity of the pain has decreased. Patient reports she is doing all of the exercises at home. Patient reports she is having some tenderness in her forearm still, but its less.         She wascompliant with home exercise program.  Response to previous treatment: last session was initial evaluation   Functional change: none    Pain: 2/10 (R) wrist initially, 6/10 (R) wrist after tx  Location: right wrist       Objective     Thelma received the following manual therapy techniques: for 19 minutes, including:    Palpation- tenderness to wrist extensor group on R arm      Functional Dry Needling to wrist extensor digitorum with 2 (30mm) needles. Needles were connected to E-stim for 10 minutes and had good muscular contraction with estim. Patient had no adverse effects from todays session.       NOT PERFORMED   Gentle STM to (R) anterior forearm and pronator teres -  Then Held   Dynamic cupping to  "forearm and wrist       Thelma received therapeutic exercises to develop ROM and flexibility for 23 minutes including:    Gentle wrist flexion stretch 3x30"   Gentle wrist extension stretch 3x30"   Putty pulling   - 1 min x 3  Wrist Flexion/ Extension 4lb   - 3x10   Wrist Radial / Ulnar Deviation   - 3lbs   - 3x10      NOT PERFORMED   Pronator terse stretch- held   median nerve glides x 10 5"  Tendon glides 1x10 ea  A and B   Radial/ulnar deviation 1x10 ea not performed        Home Exercises Provided and Patient Education Provided     Education provided:   - HEP     Written Home Exercises Provided: yes.  Exercises were reviewed and Thelma was able to demonstrate them prior to the end of the session.  Thelma demonstrated good  understanding of the education provided.     See EMR under Patient Instructions for exercises provided 5/16/2022.      Assessment     Patient presents to PT with improvements of symptoms of her wrist and forearms. Patient was progressed with wrist strengthening today and she did not experience any worsening symptoms. Patient is doing very well with her symptoms as of today. Patient was also experiencing forearm hypertonicity and dry needling was completed to address this. Patient immediately noticed an improvement after session was completed. Patient may be discharged after seen from workers comp physician. Patient is having no functional deficits as of today          Thelma Is progressing towards her goals.   Pt prognosis is Good.     Pt will continue to benefit from skilled outpatient physical therapy to address the deficits listed in the problem list box on initial evaluation, provide pt/family education and to maximize pt's level of independence in the home and community environment.     Pt's spiritual, cultural and educational needs considered and pt agreeable to plan of care and goals.    Anticipated barriers to physical therapy: work load at her job    Goals:     Short Term Goals (2 " Weeks):   1. Pt will be independent with HEP to supplement PT in improving functional use of R UE. MET 6/3/2022     Long Term Goals (6 Weeks):   1. 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  2. Pt will increase R Wrist AROM to WNL in all planes to improve functional use of R RUE. MET 6/3/2022  3. Pt will increase R Wrist PROM to WFL in all planes to improve functional use of R RUE. MET 6/3/2022  4. Pt will improve R Wrist MMTs to = L Wrist to promote equal use of B UEs in performing functional tasks.  5. Pt to report pain </= 0/10 with ADLs and IADLs using R Wrist to improve functional QOL.    Plan     Continue per POC, progress as tolerated     Joni Adame, PT

## 2022-06-07 ENCOUNTER — TELEPHONE (OUTPATIENT)
Dept: URGENT CARE | Facility: CLINIC | Age: 27
End: 2022-06-07
Payer: MEDICAID

## 2022-06-07 NOTE — TELEPHONE ENCOUNTER
Left message and call back number regarding missed [facility] health appointment. [employer]

## 2022-06-09 ENCOUNTER — PATIENT MESSAGE (OUTPATIENT)
Dept: FAMILY MEDICINE | Facility: CLINIC | Age: 27
End: 2022-06-09
Payer: MEDICAID

## 2022-06-09 DIAGNOSIS — E66.09 CLASS 1 OBESITY DUE TO EXCESS CALORIES WITHOUT SERIOUS COMORBIDITY WITH BODY MASS INDEX (BMI) OF 33.0 TO 33.9 IN ADULT: Primary | ICD-10-CM

## 2022-06-13 ENCOUNTER — DOCUMENTATION ONLY (OUTPATIENT)
Dept: REHABILITATION | Facility: HOSPITAL | Age: 27
End: 2022-06-13
Payer: MEDICAID

## 2022-06-13 NOTE — PROGRESS NOTES
PT/PTA met face to face to discuss pt's treatment plan and progress towards established goals. Pt will be seen by a physical therapist minimally every 6th visit or every 30 days.    Elton Garcia PTA      Face to Face PTA Conference performed with Ivett Ríos PTA and Elton Garcia PTA regarding patient's current status, overall progress, and plan of care.    Joni Adame, PT

## 2022-06-24 ENCOUNTER — PATIENT MESSAGE (OUTPATIENT)
Dept: FAMILY MEDICINE | Facility: CLINIC | Age: 27
End: 2022-06-24
Payer: MEDICAID

## 2022-06-27 ENCOUNTER — PATIENT MESSAGE (OUTPATIENT)
Dept: FAMILY MEDICINE | Facility: CLINIC | Age: 27
End: 2022-06-27
Payer: MEDICAID

## 2022-06-27 DIAGNOSIS — Z30.9 ENCOUNTER FOR CONTRACEPTIVE MANAGEMENT, UNSPECIFIED TYPE: Primary | ICD-10-CM

## 2022-06-29 ENCOUNTER — PATIENT MESSAGE (OUTPATIENT)
Dept: FAMILY MEDICINE | Facility: CLINIC | Age: 27
End: 2022-06-29
Payer: MEDICAID

## 2022-06-29 NOTE — TELEPHONE ENCOUNTER
I am not sure without seeing her. If she is concerned, I recommend be seen. Okay to use same day. I would be most concerned if she has blood in her stool, worsening pain, fever or vomiting  Dr. Amy Molina D.O.   Family Medicine

## 2022-07-19 ENCOUNTER — HOSPITAL ENCOUNTER (EMERGENCY)
Facility: OTHER | Age: 27
Discharge: HOME | End: 2022-07-19
Attending: EMERGENCY MEDICINE
Payer: MEDICAID

## 2022-07-19 VITALS
SYSTOLIC BLOOD PRESSURE: 124 MMHG | HEIGHT: 69 IN | BODY MASS INDEX: 31.1 KG/M2 | HEART RATE: 125 BPM | WEIGHT: 210 LBS | TEMPERATURE: 99 F | DIASTOLIC BLOOD PRESSURE: 89 MMHG | RESPIRATION RATE: 18 BRPM | OXYGEN SATURATION: 97 %

## 2022-07-19 DIAGNOSIS — U07.1 COVID-19 VIRUS INFECTION: Primary | ICD-10-CM

## 2022-07-19 LAB
B-HCG UR QL: NEGATIVE
CTP QC/QA: YES
CTP QC/QA: YES
SARS-COV-2 RDRP RESP QL NAA+PROBE: POSITIVE

## 2022-07-19 PROCEDURE — 99283 EMERGENCY DEPT VISIT LOW MDM: CPT | Mod: 25

## 2022-07-19 PROCEDURE — 25000003 PHARM REV CODE 250: Performed by: EMERGENCY MEDICINE

## 2022-07-19 PROCEDURE — U0002 COVID-19 LAB TEST NON-CDC: HCPCS | Performed by: EMERGENCY MEDICINE

## 2022-07-19 PROCEDURE — 81025 URINE PREGNANCY TEST: CPT | Performed by: EMERGENCY MEDICINE

## 2022-07-19 RX ORDER — KETOROLAC TROMETHAMINE 10 MG/1
10 TABLET, FILM COATED ORAL
Status: COMPLETED | OUTPATIENT
Start: 2022-07-19 | End: 2022-07-19

## 2022-07-19 RX ORDER — ACETAMINOPHEN 500 MG
1000 TABLET ORAL
Status: COMPLETED | OUTPATIENT
Start: 2022-07-19 | End: 2022-07-19

## 2022-07-19 RX ADMIN — ACETAMINOPHEN 1000 MG: 500 TABLET ORAL at 01:07

## 2022-07-19 RX ADMIN — KETOROLAC TROMETHAMINE 10 MG: 10 TABLET, FILM COATED ORAL at 01:07

## 2022-07-19 NOTE — Clinical Note
"Thelma Munroe" Abel was seen and treated in our emergency department on 7/19/2022.  She may return to work on 07/25/2022.       If you have any questions or concerns, please don't hesitate to call.      Cydney Gomez MD"

## 2022-07-19 NOTE — ED PROVIDER NOTES
"Encounter Date: 7/19/2022    SCRIBE #1 NOTE: I, Leanne Abdulaziz, am scribing for, and in the presence of, Cydney Gomez MD.       History     Chief Complaint   Patient presents with    Generalized Body Aches     C/o of sore throat that began yesterday. Now complaining of body/joint aches, deisy in middle/lower back, and skin sensitivity. Denies any recent fever/pmhx related to complaint. Pt appears very anxious in triage.      Time seen by provider: 1:06 AM    This is a 27 y.o. female who presents with complaint of joint pain onset a couple hours ago. The patient endorses a sore throat, headache, and states her skin feels like "sandpaper". She reports taking 600 mg of ibuprofen prior to coming to the ED with no relief. She denies fever, chills, cough, rhinorrhea, nausea, or vomiting. The patient notes her friend is currently experiencing similar symptoms.    The history is provided by the patient.     Review of patient's allergies indicates:  No Known Allergies  Past Medical History:   Diagnosis Date    Acute appendicitis 10/30/2020    Anxiety     Chronic pain of right knee 11/18/2018    Depression     Panic disorder without agoraphobia 9/16/2015     Past Surgical History:   Procedure Laterality Date    CARPAL TUNNEL RELEASE      FACIAL RECONSTRUCTION SURGERY      LAPAROSCOPIC APPENDECTOMY N/A 10/31/2020    Procedure: APPENDECTOMY, LAPAROSCOPIC;  Surgeon: Steve Saucedo Jr., MD;  Location: [facility];  Service: General;  Laterality: N/A;    TONSILLECTOMY       Family History   Problem Relation Age of Onset    No Known Problems Mother     No Known Problems Father      Social History     Tobacco Use    Smoking status: Never Smoker    Smokeless tobacco: Never Used   Substance Use Topics    Alcohol use: No    Drug use: No     Review of Systems   Constitutional: Negative for activity change, chills, fatigue and fever.   HENT: Positive for sore throat. Negative for congestion and rhinorrhea.    Respiratory: " Negative for cough and shortness of breath.    Cardiovascular: Negative for chest pain.   Gastrointestinal: Negative for abdominal pain, diarrhea, nausea and vomiting.   Genitourinary: Negative for difficulty urinating and dysuria.   Musculoskeletal: Positive for arthralgias. Negative for back pain and myalgias.   Skin: Negative for rash and wound.        Positive for skin sensitivity.   Neurological: Positive for headaches. Negative for dizziness and weakness.   Psychiatric/Behavioral: Negative for decreased concentration and dysphoric mood.   All other systems reviewed and are negative.      Physical Exam     Initial Vitals [07/19/22 0101]   BP Pulse Resp Temp SpO2   124/89 (!) 125 18 98.5 °F (36.9 °C) 97 %      MAP       --         Physical Exam    Constitutional: She appears well-developed and well-nourished. She does not have a sickly appearance. No distress.   HENT:   Head: Normocephalic and atraumatic.   Right Ear: External ear normal.   Left Ear: External ear normal.   Eyes: Conjunctivae, EOM and lids are normal. Right eye exhibits no discharge. Left eye exhibits no discharge. Right conjunctiva is not injected. Right conjunctiva has no hemorrhage. Left conjunctiva is not injected. Left conjunctiva has no hemorrhage. No scleral icterus.   Neck: Phonation normal. No stridor present. No tracheal deviation present.   Normal range of motion.  Cardiovascular: Regular rhythm and normal heart sounds. Tachycardia present.  Exam reveals no friction rub.    No murmur heard.  Pulses:       Radial pulses are 2+ on the right side and 2+ on the left side.        Dorsalis pedis pulses are 2+ on the right side and 2+ on the left side.   Pulmonary/Chest: Breath sounds normal. No respiratory distress. She has no wheezes. She has no rales.   Musculoskeletal:      Cervical back: Normal range of motion.     Neurological: She is alert and oriented to person, place, and time. She has normal strength. GCS eye subscore is 4. GCS  verbal subscore is 5. GCS motor subscore is 6.   Skin: Skin is warm.   Psychiatric: Her speech is normal and behavior is normal. Judgment and thought content normal. Her mood appears anxious. Cognition and memory are normal.         ED Course   Procedures  Labs Reviewed   SARS-COV-2 RDRP GENE - Abnormal; Notable for the following components:       Result Value    POC Rapid COVID Positive (*)     All other components within normal limits   POCT URINE PREGNANCY          Imaging Results    None          Medications   ketorolac tablet 10 mg (10 mg Oral Given 7/19/22 0141)   acetaminophen tablet 1,000 mg (1,000 mg Oral Given 7/19/22 0141)     Medical Decision Making:   History:   Old Medical Records: I decided to obtain old medical records.  Clinical Tests:   Lab Tests: Ordered and Reviewed    Additional MDM:   Comments: 27-year-old female with history of anxiety presented very anxious with complaint of generalized joint pain over the past 1-3 hours.  Triage vital signs significant for tachycardia.  This is likely secondary to her anxiety.  COVID test was positive.  She received Tylenol and Toradol in the emergency department was counseled supportive care for home.  She is given indications for seeking re-evaluation emergency department was discharged home stable condition..        Scribe Attestation:   Scribe #1: I performed the above scribed service and the documentation accurately describes the services I performed. I attest to the accuracy of the note.               Physician Attestation for Scribe: I, Cydney Gomez  , reviewed documentation as scribed in my presence, which is both accurate and complete.  Clinical Impression:   Final diagnoses:  [U07.1] COVID-19 virus infection (Primary)          ED Disposition Condition    Discharge Stable        ED Prescriptions     None        Follow-up Information     Follow up With Specialties Details Why Contact Info    Amy Molina DO Family Medicine, Internal Medicine Schedule  an appointment as soon as possible for a visit  As needed [address]   [address]   [facility] [address]  [phone]             Cydney Gomez MD  07/19/22 032

## 2022-07-19 NOTE — ED TRIAGE NOTES
Chief Complaint   Patient presents with    Generalized Body Aches     C/o of sore throat that began yesterday. Now complaining of body/joint aches, deisy in middle/lower back, and skin sensitivity. Denies any recent fever/pmhx related to complaint. Pt appears very anxious in triage.        Patient identifiers for Thelma Roman checked and correct   LOC: Patient is awake, alert, and aware of environment with an appropriate affect.  Patient is oriented x4 and speaking appropriately.  APPEARANCE: Patient resting comfortably and in no acute distress.  Patient is clean and well groomed, patient's clothing is properly fastened.  SKIN: The skin is warm and dry.  Patient has normal skin turgor and moist mucus membranes.  Skin is intact: no bruising or breakdown noted.  MUSCULOSKELETAL: Patient is moving all extremities well, no obvious swelling or deformities noted. Pulses intact.  RESPIRATORY: Airway is open and patent.  Respirations are spontaneous, even and unlabored.  Normal effort and rate noted.  CARDIAC: Patient has a normal rate and rhythm.  No peripheral edema noted.  Capillary refill < 3 seconds.  ABDOMEN: No abd distention noted.  Bowel sounds active in all 4 quadrants.  Soft and non-tender upon palpation.  NEUROLOGICAL: PERRLA.  Facial expression is symmetrical.  Hand grasps are equal bilaterally.  Normal sensation in all extremities when touched with finger.  Following commands appropriately.

## 2022-08-09 ENCOUNTER — PATIENT MESSAGE (OUTPATIENT)
Dept: PSYCHIATRY | Facility: CLINIC | Age: 27
End: 2022-08-09
Payer: MEDICAID

## 2022-08-10 ENCOUNTER — PATIENT MESSAGE (OUTPATIENT)
Dept: PSYCHIATRY | Facility: CLINIC | Age: 27
End: 2022-08-10
Payer: MEDICAID

## 2022-08-22 ENCOUNTER — OFFICE VISIT (OUTPATIENT)
Dept: PSYCHIATRY | Facility: CLINIC | Age: 27
End: 2022-08-22
Payer: MEDICAID

## 2022-08-22 DIAGNOSIS — F32.1 CURRENT MODERATE EPISODE OF MAJOR DEPRESSIVE DISORDER, UNSPECIFIED WHETHER RECURRENT: ICD-10-CM

## 2022-08-22 DIAGNOSIS — F43.10 PTSD (POST-TRAUMATIC STRESS DISORDER): ICD-10-CM

## 2022-08-22 DIAGNOSIS — F41.1 GENERALIZED ANXIETY DISORDER WITH PANIC ATTACKS: ICD-10-CM

## 2022-08-22 DIAGNOSIS — F41.0 GENERALIZED ANXIETY DISORDER WITH PANIC ATTACKS: ICD-10-CM

## 2022-08-22 DIAGNOSIS — F41.1 GENERALIZED ANXIETY DISORDER: ICD-10-CM

## 2022-08-22 RX ORDER — VENLAFAXINE HYDROCHLORIDE 150 MG/1
150 CAPSULE, EXTENDED RELEASE ORAL DAILY
Qty: 30 CAPSULE | Refills: 3 | Status: SHIPPED | OUTPATIENT
Start: 2022-08-22 | End: 2022-10-24 | Stop reason: SDUPTHER

## 2022-08-22 RX ORDER — BUPROPION HYDROCHLORIDE 150 MG/1
150 TABLET ORAL DAILY
Qty: 30 TABLET | Refills: 3 | Status: SHIPPED | OUTPATIENT
Start: 2022-08-22 | End: 2022-10-24

## 2022-08-22 RX ORDER — HYDROXYZINE PAMOATE 25 MG/1
50 CAPSULE ORAL NIGHTLY PRN
Qty: 60 CAPSULE | Refills: 3 | Status: SHIPPED | OUTPATIENT
Start: 2022-08-22
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
## 2022-08-26 ENCOUNTER — OFFICE VISIT (OUTPATIENT)
Dept: OBSTETRICS AND GYNECOLOGY | Facility: CLINIC | Age: 27
End: 2022-08-26
Payer: MEDICAID

## 2022-08-26 VITALS
WEIGHT: 215.31 LBS | SYSTOLIC BLOOD PRESSURE: 134 MMHG | BODY MASS INDEX: 31.79 KG/M2 | DIASTOLIC BLOOD PRESSURE: 80 MMHG

## 2022-08-26 DIAGNOSIS — Z01.419 WELL WOMAN EXAM WITH ROUTINE GYNECOLOGICAL EXAM: Primary | ICD-10-CM

## 2022-08-26 DIAGNOSIS — Z12.4 SCREENING FOR CERVICAL CANCER: ICD-10-CM

## 2022-08-26 DIAGNOSIS — Z30.9 ENCOUNTER FOR CONTRACEPTIVE MANAGEMENT, UNSPECIFIED TYPE: ICD-10-CM

## 2022-08-26 PROCEDURE — 3079F DIAST BP 80-89 MM HG: CPT | Mod: CPTII,,, | Performed by: OBSTETRICS & GYNECOLOGY

## 2022-08-26 PROCEDURE — 99999 PR PBB SHADOW E&M-EST. PATIENT-LVL III: CPT | Mod: PBBFAC,,, | Performed by: OBSTETRICS & GYNECOLOGY

## 2022-08-26 PROCEDURE — 3008F BODY MASS INDEX DOCD: CPT | Mod: CPTII,,, | Performed by: OBSTETRICS & GYNECOLOGY

## 2022-08-26 PROCEDURE — 3075F PR MOST RECENT SYSTOLIC BLOOD PRESS GE 130-139MM HG: ICD-10-PCS | Mod: CPTII,,, | Performed by: OBSTETRICS & GYNECOLOGY

## 2022-08-26 PROCEDURE — 3075F SYST BP GE 130 - 139MM HG: CPT | Mod: CPTII,,, | Performed by: OBSTETRICS & GYNECOLOGY

## 2022-08-26 PROCEDURE — 99999 PR PBB SHADOW E&M-EST. PATIENT-LVL III: ICD-10-PCS | Mod: PBBFAC,,, | Performed by: OBSTETRICS & GYNECOLOGY

## 2022-08-26 PROCEDURE — 3079F PR MOST RECENT DIASTOLIC BLOOD PRESSURE 80-89 MM HG: ICD-10-PCS | Mod: CPTII,,, | Performed by: OBSTETRICS & GYNECOLOGY

## 2022-08-26 PROCEDURE — 88142 CYTOPATH C/V THIN LAYER: CPT | Performed by: OBSTETRICS & GYNECOLOGY

## 2022-08-26 PROCEDURE — 99385 PREV VISIT NEW AGE 18-39: CPT | Mod: S$PBB,,, | Performed by: OBSTETRICS & GYNECOLOGY

## 2022-08-26 PROCEDURE — 1160F PR REVIEW ALL MEDS BY PRESCRIBER/CLIN PHARMACIST DOCUMENTED: ICD-10-PCS | Mod: CPTII,,, | Performed by: OBSTETRICS & GYNECOLOGY

## 2022-08-26 PROCEDURE — 1159F MED LIST DOCD IN RCRD: CPT | Mod: CPTII,,, | Performed by: OBSTETRICS & GYNECOLOGY

## 2022-08-26 PROCEDURE — 1159F PR MEDICATION LIST DOCUMENTED IN MEDICAL RECORD: ICD-10-PCS | Mod: CPTII,,, | Performed by: OBSTETRICS & GYNECOLOGY

## 2022-08-26 PROCEDURE — 99385 PR PREVENTIVE VISIT,NEW,18-39: ICD-10-PCS | Mod: S$PBB,,, | Performed by: OBSTETRICS & GYNECOLOGY

## 2022-08-26 PROCEDURE — 99213 OFFICE O/P EST LOW 20 MIN: CPT | Mod: PBBFAC,PO | Performed by: OBSTETRICS & GYNECOLOGY

## 2022-08-26 PROCEDURE — 3008F PR BODY MASS INDEX (BMI) DOCUMENTED: ICD-10-PCS | Mod: CPTII,,, | Performed by: OBSTETRICS & GYNECOLOGY

## 2022-08-26 PROCEDURE — 1160F RVW MEDS BY RX/DR IN RCRD: CPT | Mod: CPTII,,, | Performed by: OBSTETRICS & GYNECOLOGY

## 2022-08-26 NOTE — PROGRESS NOTES
GYNECOLOGY OFFICE NOTE    Reason for visit: annual    HPI: Pt is a 27 y.o.  female  who presents for annual. Menarche: 13. Cycle: Interval-  Q month, Duration- 5-7 days, Flow- normal (changing unsure of how often she changes for 2 heavy days ), reports dysmenorrhea- not alleviated with ibuprofen. She is sexually active.  She uses nexplanon for contraception- inserted 2020.  She does not desire STI screening. She denies vaginal discharge.  Last pap: 2020, denies hx of abnormal. Desires nexplanon removal- as she believes it could have made mood changes worsen. The use of hormonal contraception has been fully discussed with the patient. We discussed all options including OCPs, transdermal patches, vaginal ring, Depo Provera injections, Nexplanon, and IUD. Warnings about anticipated minor side effects such as breakthrough spotting, nausea, breast tenderness, weight changes, acne, headaches, etc were given.  She has been told of the more serious potential side effects such as MI, stroke, and deep vein thrombosis, all of which are very unlikely.  She has been asked to report any signs of such serious problems immediately. The need for additional protection, such as a condom, to prevent exposure to sexually transmitted diseases has also been discussed- the patient has been clearly reminded that no hormonal contraceptive method can protect her against diseases such as HIV and others. She understands and wishes to have a bilateral salpingectomy. She understands that is considered permanent, non reversible and the other option for delivery would be IVF or adoptions. Pt desires to proceed with bilateral salpingectomy- Medicaid consents signed.       Past Medical History:   Diagnosis Date    Acute appendicitis 10/30/2020    Anxiety     Chronic pain of right knee 2018    Depression     Panic disorder without agoraphobia 2015       Past Surgical History:   Procedure Laterality Date    CARPAL  TUNNEL RELEASE      FACIAL RECONSTRUCTION SURGERY      LAPAROSCOPIC APPENDECTOMY N/A 10/31/2020    Procedure: APPENDECTOMY, LAPAROSCOPIC;  Surgeon: Steve Saucedo Jr., MD;  Location: [facility];  Service: General;  Laterality: N/A;    TONSILLECTOMY         Family History   Problem Relation Age of Onset    No Known Problems Mother     Colon cancer Father     Breast cancer Neg Hx     Ovarian cancer Neg Hx        Social History     Tobacco Use    Smoking status: Never Smoker    Smokeless tobacco: Never Used   Substance Use Topics    Alcohol use: No    Drug use: No       OB History    Para Term  AB Living   0 0 0 0 0 0   SAB IAB Ectopic Multiple Live Births   0 0 0 0 0       Current Outpatient Medications   Medication Sig    buPROPion (WELLBUTRIN XL) 150 MG TB24 tablet Take 1 tablet (150 mg total) by mouth once daily.    etonogestrel (NEXPLANON) 68 mg Impl subdermal device by Subdermal route.     hydrOXYzine pamoate (VISTARIL) 25 MG Cap Take 2 capsules (50 mg total) by mouth nightly as needed (insomnia).    venlafaxine (EFFEXOR-XR) 150 MG Cp24 Take 1 capsule (150 mg total) by mouth once daily.     No current facility-administered medications for this visit.       Allergies: Patient has no known allergies.     /80   Wt 97.7 kg (215 lb 4.8 oz)   LMP 08/15/2022   BMI 31.79 kg/m²     ROS:  GENERAL: Denies fever or chills.   SKIN: Denies rash or lesions.   HEAD: Denies head injury or headache.   CHEST: Denies chest pain or shortness of breath.   CARDIOVASCULAR: Denies palpitations or chest pain.   ABDOMEN: No constipation, diarrhea, nausea, vomiting or rectal bleeding.   URINARY: No dysuria, hematuria, or burning on urination.  REPRODUCTIVE: See HPI.   BREASTS: see HPI  NEUROLOGIC: Denies syncope or weakness.     Physical Exam:  GENERAL: alert, appears stated age and cooperative  NEUROLOGIC: orientated to person, place and time, normal mood and affect   CHEST: Normal respiratory  effort  NECK: normal appearance  SKIN: no acne, hirsutism  BREAST EXAM: breasts appear normal, no suspicious masses, no skin or nipple changes or axillary nodes  ABDOMEN: abdomen is soft without significant tenderness, masses  EXTERNAL GENITALIA:  normal general appearance  URETHRA: normal urethra, normal urethral meatus  VAGINA:  normal mucosa, no  lesions  CERVIX:  Normal  UTERUS:  mobile, non tender  ADNEXA: nontender    Diagnosis:  1. Well woman exam with routine gynecological exam    2. Encounter for contraceptive management, unspecified type    3. Screening for cervical cancer        Plan:   1. Annual  2. Pt desires BTL and nexplanon removal at time of surgery.   3. Pap today    Orders Placed This Encounter    Liquid-Based Pap Smear, Screening         Shivani Rodgers MD  OB/GYN

## 2022-09-02 LAB
FINAL PATHOLOGIC DIAGNOSIS: NORMAL
Lab: NORMAL

## 2022-09-16 ENCOUNTER — TELEPHONE (OUTPATIENT)
Dept: OBSTETRICS AND GYNECOLOGY | Facility: CLINIC | Age: 27
End: 2022-09-16
Payer: MEDICAID

## 2022-09-16 NOTE — TELEPHONE ENCOUNTER
----- Message from Mohini Radford sent at 9/16/2022 12:46 PM CDT -----  Type:  Needs Medical Advice    Who Called: self  Reason:returning call  Would the patient rather a call back or a response via [facility]? call  Best Call Back Number: [phone]  Additional Information: none

## 2022-10-05 ENCOUNTER — PATIENT MESSAGE (OUTPATIENT)
Dept: PSYCHIATRY | Facility: CLINIC | Age: 27
End: 2022-10-05
Payer: MEDICAID

## 2022-10-17 ENCOUNTER — OFFICE VISIT (OUTPATIENT)
Dept: OBSTETRICS AND GYNECOLOGY | Facility: CLINIC | Age: 27
End: 2022-10-17
Payer: MEDICAID

## 2022-10-17 VITALS
WEIGHT: 216.19 LBS | DIASTOLIC BLOOD PRESSURE: 76 MMHG | SYSTOLIC BLOOD PRESSURE: 116 MMHG | BODY MASS INDEX: 31.92 KG/M2

## 2022-10-17 DIAGNOSIS — Z30.2 REQUEST FOR STERILIZATION: Primary | ICD-10-CM

## 2022-10-17 DIAGNOSIS — Z30.2 ADMISSION FOR STERILIZATION: ICD-10-CM

## 2022-10-17 DIAGNOSIS — Z01.818 PREOP EXAMINATION: ICD-10-CM

## 2022-10-17 PROCEDURE — 1160F RVW MEDS BY RX/DR IN RCRD: CPT | Mod: CPTII,,, | Performed by: OBSTETRICS & GYNECOLOGY

## 2022-10-17 PROCEDURE — 1159F PR MEDICATION LIST DOCUMENTED IN MEDICAL RECORD: ICD-10-PCS | Mod: CPTII,,, | Performed by: OBSTETRICS & GYNECOLOGY

## 2022-10-17 PROCEDURE — 3074F PR MOST RECENT SYSTOLIC BLOOD PRESSURE < 130 MM HG: ICD-10-PCS | Mod: CPTII,,, | Performed by: OBSTETRICS & GYNECOLOGY

## 2022-10-17 PROCEDURE — 99999 PR PBB SHADOW E&M-EST. PATIENT-LVL III: CPT | Mod: PBBFAC,,, | Performed by: OBSTETRICS & GYNECOLOGY

## 2022-10-17 PROCEDURE — 1159F MED LIST DOCD IN RCRD: CPT | Mod: CPTII,,, | Performed by: OBSTETRICS & GYNECOLOGY

## 2022-10-17 PROCEDURE — 3078F DIAST BP <80 MM HG: CPT | Mod: CPTII,,, | Performed by: OBSTETRICS & GYNECOLOGY

## 2022-10-17 PROCEDURE — 99213 OFFICE O/P EST LOW 20 MIN: CPT | Mod: PBBFAC,PO | Performed by: OBSTETRICS & GYNECOLOGY

## 2022-10-17 PROCEDURE — 99499 NO LOS: ICD-10-PCS | Mod: S$PBB,,, | Performed by: OBSTETRICS & GYNECOLOGY

## 2022-10-17 PROCEDURE — 3074F SYST BP LT 130 MM HG: CPT | Mod: CPTII,,, | Performed by: OBSTETRICS & GYNECOLOGY

## 2022-10-17 PROCEDURE — 3078F PR MOST RECENT DIASTOLIC BLOOD PRESSURE < 80 MM HG: ICD-10-PCS | Mod: CPTII,,, | Performed by: OBSTETRICS & GYNECOLOGY

## 2022-10-17 PROCEDURE — 99999 PR PBB SHADOW E&M-EST. PATIENT-LVL III: ICD-10-PCS | Mod: PBBFAC,,, | Performed by: OBSTETRICS & GYNECOLOGY

## 2022-10-17 PROCEDURE — 1160F PR REVIEW ALL MEDS BY PRESCRIBER/CLIN PHARMACIST DOCUMENTED: ICD-10-PCS | Mod: CPTII,,, | Performed by: OBSTETRICS & GYNECOLOGY

## 2022-10-17 PROCEDURE — 99499 UNLISTED E&M SERVICE: CPT | Mod: S$PBB,,, | Performed by: OBSTETRICS & GYNECOLOGY

## 2022-10-17 RX ORDER — CEFAZOLIN SODIUM 2 G/50ML
2 SOLUTION INTRAVENOUS
Status: CANCELLED | OUTPATIENT
Start: 2022-10-17

## 2022-10-17 RX ORDER — SODIUM CHLORIDE 9 MG/ML
INJECTION, SOLUTION INTRAVENOUS CONTINUOUS
Status: CANCELLED | OUTPATIENT
Start: 2022-10-17

## 2022-10-17 RX ORDER — MUPIROCIN 20 MG/G
OINTMENT TOPICAL
Status: CANCELLED | OUTPATIENT
Start: 2022-10-17

## 2022-10-17 NOTE — PROGRESS NOTES
DU Pre-op Exam      HPI : Thelma Roman is a 27 y.o. female  for preop appointment for bilateral salpingectomy secondary to undesired fertility with nexplanon removal. Surgery scheduled for 2022. Patient was counseled on all options available for contraception including vasectomy. She understands and wishes to have a bilateral salpingectomy. She understands that is considered permanent, non reversible and the other option for delivery would be IVF or adoption. The pros, cons, risks, benefits and alternatives all laparoscopic surgery were discussed.  The potential for injury to bowel, bladder, blood vessel and ureter was discussed.  The possible need for a blood transfusion discussed. The potential need to open the abdomen was discussed. The patient has opted to proceed with nexplanon removal and bilateral salpingectomy.    Past Medical History:   Diagnosis Date    Acute appendicitis 10/30/2020    Anxiety     Chronic pain of right knee 2018    Depression     Panic disorder without agoraphobia 2015     Past Surgical History:   Procedure Laterality Date    CARPAL TUNNEL RELEASE      FACIAL RECONSTRUCTION SURGERY      LAPAROSCOPIC APPENDECTOMY N/A 10/31/2020    Procedure: APPENDECTOMY, LAPAROSCOPIC;  Surgeon: Steve Saucedo Jr., MD;  Location: [facility];  Service: General;  Laterality: N/A;    TONSILLECTOMY       Family History   Problem Relation Age of Onset    No Known Problems Mother     Breast cancer Neg Hx     Ovarian cancer Neg Hx      Social History     Tobacco Use    Smoking status: Never    Smokeless tobacco: Never   Substance Use Topics    Alcohol use: No    Drug use: No     OB History    Para Term  AB Living   0 0 0 0 0 0   SAB IAB Ectopic Multiple Live Births   0 0 0 0 0       /76   Wt 98.1 kg (216 lb 2.6 oz)   LMP 10/17/2022   BMI 31.92 kg/m²     ROS:  GENERAL: Feeling well overall.   SKIN: Denies rash or lesions.   HEAD: Denies head injury or headache.    NODES: Denies enlarged lymph nodes.   CHEST: Denies chest pain or shortness of breath.   CARDIOVASCULAR: Denies palpitation  ABDOMEN: No abdominal pain, constipation, diarrhea, nausea, vomiting or rectal bleeding.   URINARY: No frequency, dysuria, hematuria, or burning on urination.  REPRODUCTIVE: See HPI.   BREASTS: Denies pain, lumps, or nipple discharge.   HEMATOLOGIC: No easy bruisability.  MUSCULOSKELETAL: Denies joint pain or swelling.   NEUROLOGIC: Denies syncope or weakness.   PSYCHIATRIC: Denies depression, anxiety or mood swings.      Physical Exam:  GENERAL: alert, appears stated age and cooperative  NEUROLOGIC: orientated to person, place and time, normal mood and affect   CHEST: Normal respiratory effort  NECK: normal appearance  SKIN: no acne, striae, hirsutism  ABDOMEN: abdomen is soft without significant tenderness  PELVIC: deferred      ASSESSMENT & PLAN:  Desires sterilization    I have discussed the risks, benefits, indications, and alternatives of the procedure in detail.  The patient verbalizes her understanding.  All questions answered.  Consents signed.  The patient agrees to proceed to proceed as planned: laparoscopic bilateral salpingectomy and nexplanon removal       Face to Face time with patient: 30 minutes of total time spent on the encounter, which includes face to face time and non-face to face time preparing to see the patient (eg, review of tests), Obtaining and/or reviewing separately obtained history, Documenting clinical information in the electronic or other health record, Independently interpreting results (not separately reported) and communicating results to the patient/family/caregiver, or Care coordination (not separately reported).       Shivani Rodgers MD  OB/GYN

## 2022-10-17 NOTE — H&P (VIEW-ONLY)
DU Pre-op Exam      HPI : Thelma Roman is a 27 y.o. female  for preop appointment for bilateral salpingectomy secondary to undesired fertility with nexplanon removal. Surgery scheduled for 2022. Patient was counseled on all options available for contraception including vasectomy. She understands and wishes to have a bilateral salpingectomy. She understands that is considered permanent, non reversible and the other option for delivery would be IVF or adoption. The pros, cons, risks, benefits and alternatives all laparoscopic surgery were discussed.  The potential for injury to bowel, bladder, blood vessel and ureter was discussed.  The possible need for a blood transfusion discussed. The potential need to open the abdomen was discussed. The patient has opted to proceed with nexplanon removal and bilateral salpingectomy.    Past Medical History:   Diagnosis Date    Acute appendicitis 10/30/2020    Anxiety     Chronic pain of right knee 2018    Depression     Panic disorder without agoraphobia 2015     Past Surgical History:   Procedure Laterality Date    CARPAL TUNNEL RELEASE      FACIAL RECONSTRUCTION SURGERY      LAPAROSCOPIC APPENDECTOMY N/A 10/31/2020    Procedure: APPENDECTOMY, LAPAROSCOPIC;  Surgeon: Steve Saucedo Jr., MD;  Location: [facility];  Service: General;  Laterality: N/A;    TONSILLECTOMY       Family History   Problem Relation Age of Onset    No Known Problems Mother     Breast cancer Neg Hx     Ovarian cancer Neg Hx      Social History     Tobacco Use    Smoking status: Never    Smokeless tobacco: Never   Substance Use Topics    Alcohol use: No    Drug use: No     OB History    Para Term  AB Living   0 0 0 0 0 0   SAB IAB Ectopic Multiple Live Births   0 0 0 0 0       /76   Wt 98.1 kg (216 lb 2.6 oz)   LMP 10/17/2022   BMI 31.92 kg/m²     ROS:  GENERAL: Feeling well overall.   SKIN: Denies rash or lesions.   HEAD: Denies head injury or headache.    NODES: Denies enlarged lymph nodes.   CHEST: Denies chest pain or shortness of breath.   CARDIOVASCULAR: Denies palpitation  ABDOMEN: No abdominal pain, constipation, diarrhea, nausea, vomiting or rectal bleeding.   URINARY: No frequency, dysuria, hematuria, or burning on urination.  REPRODUCTIVE: See HPI.   BREASTS: Denies pain, lumps, or nipple discharge.   HEMATOLOGIC: No easy bruisability.  MUSCULOSKELETAL: Denies joint pain or swelling.   NEUROLOGIC: Denies syncope or weakness.   PSYCHIATRIC: Denies depression, anxiety or mood swings.      Physical Exam:  GENERAL: alert, appears stated age and cooperative  NEUROLOGIC: orientated to person, place and time, normal mood and affect   CHEST: Normal respiratory effort  NECK: normal appearance  SKIN: no acne, striae, hirsutism  ABDOMEN: abdomen is soft without significant tenderness  PELVIC: deferred      ASSESSMENT & PLAN:  Desires sterilization    I have discussed the risks, benefits, indications, and alternatives of the procedure in detail.  The patient verbalizes her understanding.  All questions answered.  Consents signed.  The patient agrees to proceed to proceed as planned: laparoscopic bilateral salpingectomy and nexplanon removal       Face to Face time with patient: 30 minutes of total time spent on the encounter, which includes face to face time and non-face to face time preparing to see the patient (eg, review of tests), Obtaining and/or reviewing separately obtained history, Documenting clinical information in the electronic or other health record, Independently interpreting results (not separately reported) and communicating results to the patient/family/caregiver, or Care coordination (not separately reported).       Shivani Rodgers MD  OB/GYN

## 2022-10-24 ENCOUNTER — OFFICE VISIT (OUTPATIENT)
Dept: PSYCHIATRY | Facility: CLINIC | Age: 27
End: 2022-10-24
Payer: MEDICAID

## 2022-10-24 DIAGNOSIS — F33.1 MODERATE EPISODE OF RECURRENT MAJOR DEPRESSIVE DISORDER: Primary | ICD-10-CM

## 2022-10-24 DIAGNOSIS — F41.0 GENERALIZED ANXIETY DISORDER WITH PANIC ATTACKS: ICD-10-CM

## 2022-10-24 DIAGNOSIS — F41.1 GENERALIZED ANXIETY DISORDER WITH PANIC ATTACKS: ICD-10-CM

## 2022-10-24 DIAGNOSIS — F32.1 CURRENT MODERATE EPISODE OF MAJOR DEPRESSIVE DISORDER, UNSPECIFIED WHETHER RECURRENT: ICD-10-CM

## 2022-10-24 DIAGNOSIS — F43.10 PTSD (POST-TRAUMATIC STRESS DISORDER): ICD-10-CM

## 2022-10-24 PROCEDURE — 99999 PR PBB SHADOW E&M-EST. PATIENT-LVL I: ICD-10-PCS | Mod: PBBFAC,HB,, | Performed by: STUDENT IN AN ORGANIZED HEALTH CARE EDUCATION/TRAINING PROGRAM

## 2022-10-24 PROCEDURE — 99211 OFF/OP EST MAY X REQ PHY/QHP: CPT | Mod: PBBFAC | Performed by: STUDENT IN AN ORGANIZED HEALTH CARE EDUCATION/TRAINING PROGRAM

## 2022-10-24 PROCEDURE — 99999 PR PBB SHADOW E&M-EST. PATIENT-LVL I: CPT | Mod: PBBFAC,HB,, | Performed by: STUDENT IN AN ORGANIZED HEALTH CARE EDUCATION/TRAINING PROGRAM

## 2022-10-24 RX ORDER — VENLAFAXINE HYDROCHLORIDE 75 MG/1
75 CAPSULE, EXTENDED RELEASE ORAL DAILY
Qty: 30 CAPSULE | Refills: 3 | Status: SHIPPED | OUTPATIENT
Start: 2022-10-24 | End: 2022-11-18

## 2022-10-24 RX ORDER — VENLAFAXINE HYDROCHLORIDE 150 MG/1
150 CAPSULE, EXTENDED RELEASE ORAL DAILY
Qty: 30 CAPSULE | Refills: 3 | Status: SHIPPED | OUTPATIENT
Start: 2022-10-24 | End: 2023-02-06 | Stop reason: SDUPTHER

## 2022-10-24 RX ORDER — BUPROPION HYDROCHLORIDE 150 MG/1
300 TABLET ORAL DAILY
Qty: 30 TABLET | Refills: 3 | Status: SHIPPED | OUTPATIENT
Start: 2022-10-24 | End: 2023-02-06
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
## 2022-10-31 ENCOUNTER — ANESTHESIA EVENT (OUTPATIENT)
Dept: SURGERY | Facility: HOSPITAL | Age: 27
End: 2022-10-31
Payer: MEDICAID

## 2022-11-08 ENCOUNTER — PATIENT MESSAGE (OUTPATIENT)
Dept: SURGERY | Facility: HOSPITAL | Age: 27
End: 2022-11-08
Payer: MEDICAID

## 2022-11-09 ENCOUNTER — ANESTHESIA (OUTPATIENT)
Dept: SURGERY | Facility: HOSPITAL | Age: 27
End: 2022-11-09
Payer: MEDICAID

## 2022-11-09 ENCOUNTER — HOSPITAL ENCOUNTER (OUTPATIENT)
Facility: HOSPITAL | Age: 27
Discharge: HOME | End: 2022-11-09
Attending: OBSTETRICS & GYNECOLOGY | Admitting: OBSTETRICS & GYNECOLOGY
Payer: MEDICAID

## 2022-11-09 VITALS
BODY MASS INDEX: 31.84 KG/M2 | WEIGHT: 215 LBS | RESPIRATION RATE: 20 BRPM | HEIGHT: 69 IN | SYSTOLIC BLOOD PRESSURE: 133 MMHG | DIASTOLIC BLOOD PRESSURE: 85 MMHG | OXYGEN SATURATION: 96 % | HEART RATE: 108 BPM | TEMPERATURE: 98 F

## 2022-11-09 DIAGNOSIS — Z90.79 STATUS POST BILATERAL SALPINGECTOMY: Primary | ICD-10-CM

## 2022-11-09 DIAGNOSIS — Z30.2 REQUEST FOR STERILIZATION: ICD-10-CM

## 2022-11-09 DIAGNOSIS — Z30.2 ADMISSION FOR STERILIZATION: ICD-10-CM

## 2022-11-09 LAB
ABO + RH BLD: NORMAL
B-HCG UR QL: NEGATIVE
BASOPHILS # BLD AUTO: 0.03 K/UL (ref 0–0.2)
BASOPHILS NFR BLD: 0.3 % (ref 0–1.9)
BLD GP AB SCN CELLS X3 SERPL QL: NORMAL
CTP QC/QA: YES
DIFFERENTIAL METHOD: NORMAL
EOSINOPHIL # BLD AUTO: 0.1 K/UL (ref 0–0.5)
EOSINOPHIL NFR BLD: 0.8 % (ref 0–8)
ERYTHROCYTE [DISTWIDTH] IN BLOOD BY AUTOMATED COUNT: 12.7 % (ref 11.5–14.5)
HCT VFR BLD AUTO: 42.6 % (ref 37–48.5)
HGB BLD-MCNC: 14 G/DL (ref 12–16)
IMM GRANULOCYTES # BLD AUTO: 0.03 K/UL (ref 0–0.04)
IMM GRANULOCYTES NFR BLD AUTO: 0.3 % (ref 0–0.5)
LYMPHOCYTES # BLD AUTO: 3 K/UL (ref 1–4.8)
LYMPHOCYTES NFR BLD: 32.1 % (ref 18–48)
MCH RBC QN AUTO: 28.7 PG (ref 27–31)
MCHC RBC AUTO-ENTMCNC: 32.9 G/DL (ref 32–36)
MCV RBC AUTO: 87 FL (ref 82–98)
MONOCYTES # BLD AUTO: 0.7 K/UL (ref 0.3–1)
MONOCYTES NFR BLD: 7.6 % (ref 4–15)
NEUTROPHILS # BLD AUTO: 5.4 K/UL (ref 1.8–7.7)
NEUTROPHILS NFR BLD: 58.9 % (ref 38–73)
NRBC BLD-RTO: 0 /100 WBC
PLATELET # BLD AUTO: 327 K/UL (ref 150–450)
PMV BLD AUTO: 9.9 FL (ref 9.2–12.9)
RBC # BLD AUTO: 4.88 M/UL (ref 4–5.4)
WBC # BLD AUTO: 9.24 K/UL (ref 3.9–12.7)

## 2022-11-09 PROCEDURE — 25000003 PHARM REV CODE 250: Performed by: NURSE ANESTHETIST, CERTIFIED REGISTERED

## 2022-11-09 PROCEDURE — 88302 TISSUE EXAM BY PATHOLOGIST: CPT | Mod: 26,,, | Performed by: PATHOLOGY

## 2022-11-09 PROCEDURE — 58661 PR LAP,RMV  ADNEXAL STRUCTURE: ICD-10-PCS | Mod: 50,,, | Performed by: OBSTETRICS & GYNECOLOGY

## 2022-11-09 PROCEDURE — 36415 COLL VENOUS BLD VENIPUNCTURE: CPT | Performed by: OBSTETRICS & GYNECOLOGY

## 2022-11-09 PROCEDURE — 36000708 HC OR TIME LEV III 1ST 15 MIN: Performed by: OBSTETRICS & GYNECOLOGY

## 2022-11-09 PROCEDURE — 63600175 PHARM REV CODE 636 W HCPCS: Performed by: NURSE ANESTHETIST, CERTIFIED REGISTERED

## 2022-11-09 PROCEDURE — 63600175 PHARM REV CODE 636 W HCPCS: Performed by: ANESTHESIOLOGY

## 2022-11-09 PROCEDURE — 25000003 PHARM REV CODE 250: Performed by: OBSTETRICS & GYNECOLOGY

## 2022-11-09 PROCEDURE — 11982 PR REMOVAL DRUG IMPLANT DEVICE: ICD-10-PCS | Mod: 51,,, | Performed by: OBSTETRICS & GYNECOLOGY

## 2022-11-09 PROCEDURE — 71000016 HC POSTOP RECOV ADDL HR: Performed by: OBSTETRICS & GYNECOLOGY

## 2022-11-09 PROCEDURE — 11982 REMOVE DRUG IMPLANT DEVICE: CPT | Mod: 51,,, | Performed by: OBSTETRICS & GYNECOLOGY

## 2022-11-09 PROCEDURE — 88302 PR  SURG PATH,LEVEL II: ICD-10-PCS | Mod: 26,,, | Performed by: PATHOLOGY

## 2022-11-09 PROCEDURE — D9220A PRA ANESTHESIA: Mod: ANES,,, | Performed by: ANESTHESIOLOGY

## 2022-11-09 PROCEDURE — 58661 LAPAROSCOPY REMOVE ADNEXA: CPT | Mod: 50,,, | Performed by: OBSTETRICS & GYNECOLOGY

## 2022-11-09 PROCEDURE — 94799 UNLISTED PULMONARY SVC/PX: CPT

## 2022-11-09 PROCEDURE — 86901 BLOOD TYPING SEROLOGIC RH(D): CPT | Performed by: OBSTETRICS & GYNECOLOGY

## 2022-11-09 PROCEDURE — D9220A PRA ANESTHESIA: Mod: CRNA,,, | Performed by: NURSE ANESTHETIST, CERTIFIED REGISTERED

## 2022-11-09 PROCEDURE — 71000033 HC RECOVERY, INTIAL HOUR: Performed by: OBSTETRICS & GYNECOLOGY

## 2022-11-09 PROCEDURE — 36000709 HC OR TIME LEV III EA ADD 15 MIN: Performed by: OBSTETRICS & GYNECOLOGY

## 2022-11-09 PROCEDURE — 85025 COMPLETE CBC W/AUTO DIFF WBC: CPT | Performed by: OBSTETRICS & GYNECOLOGY

## 2022-11-09 PROCEDURE — 37000009 HC ANESTHESIA EA ADD 15 MINS: Performed by: OBSTETRICS & GYNECOLOGY

## 2022-11-09 PROCEDURE — 27201423 OPTIME MED/SURG SUP & DEVICES STERILE SUPPLY: Performed by: OBSTETRICS & GYNECOLOGY

## 2022-11-09 PROCEDURE — 71000015 HC POSTOP RECOV 1ST HR: Performed by: OBSTETRICS & GYNECOLOGY

## 2022-11-09 PROCEDURE — D9220A PRA ANESTHESIA: ICD-10-PCS | Mod: ANES,,, | Performed by: ANESTHESIOLOGY

## 2022-11-09 PROCEDURE — 00840 ANES IPER PX LOWER ABD NOS: CPT | Performed by: OBSTETRICS & GYNECOLOGY

## 2022-11-09 PROCEDURE — 88302 TISSUE EXAM BY PATHOLOGIST: CPT | Performed by: PATHOLOGY

## 2022-11-09 PROCEDURE — 63600175 PHARM REV CODE 636 W HCPCS: Performed by: OBSTETRICS & GYNECOLOGY

## 2022-11-09 PROCEDURE — D9220A PRA ANESTHESIA: ICD-10-PCS | Mod: CRNA,,, | Performed by: NURSE ANESTHETIST, CERTIFIED REGISTERED

## 2022-11-09 PROCEDURE — 37000008 HC ANESTHESIA 1ST 15 MINUTES: Performed by: OBSTETRICS & GYNECOLOGY

## 2022-11-09 RX ORDER — PROPOFOL 10 MG/ML
VIAL (ML) INTRAVENOUS
Status: DISCONTINUED | OUTPATIENT
Start: 2022-11-09 | End: 2022-11-09

## 2022-11-09 RX ORDER — ONDANSETRON 2 MG/ML
INJECTION INTRAMUSCULAR; INTRAVENOUS
Status: DISCONTINUED | OUTPATIENT
Start: 2022-11-09 | End: 2022-11-09

## 2022-11-09 RX ORDER — MUPIROCIN 20 MG/G
OINTMENT TOPICAL
Status: DISCONTINUED | OUTPATIENT
Start: 2022-11-09 | End: 2022-11-09

## 2022-11-09 RX ORDER — PROCHLORPERAZINE EDISYLATE 5 MG/ML
5 INJECTION INTRAMUSCULAR; INTRAVENOUS EVERY 6 HOURS PRN
Status: CANCELLED | OUTPATIENT
Start: 2022-11-09

## 2022-11-09 RX ORDER — IBUPROFEN 600 MG/1
600 TABLET ORAL EVERY 6 HOURS PRN
Status: CANCELLED | OUTPATIENT
Start: 2022-11-09

## 2022-11-09 RX ORDER — HYDROCODONE BITARTRATE AND ACETAMINOPHEN 5; 325 MG/1; MG/1
1 TABLET ORAL EVERY 12 HOURS PRN
Qty: 6 TABLET | Refills: 0 | Status: SHIPPED | OUTPATIENT
Start: 2022-11-09 | End: 2022-11-18

## 2022-11-09 RX ORDER — FENTANYL CITRATE 50 UG/ML
INJECTION, SOLUTION INTRAMUSCULAR; INTRAVENOUS
Status: DISCONTINUED | OUTPATIENT
Start: 2022-11-09 | End: 2022-11-09

## 2022-11-09 RX ORDER — MEPERIDINE HYDROCHLORIDE 50 MG/ML
12.5 INJECTION INTRAMUSCULAR; INTRAVENOUS; SUBCUTANEOUS ONCE AS NEEDED
Status: DISCONTINUED | OUTPATIENT
Start: 2022-11-09 | End: 2022-11-09 | Stop reason: HOSPADM

## 2022-11-09 RX ORDER — HYDROCODONE BITARTRATE AND ACETAMINOPHEN 5; 325 MG/1; MG/1
1 TABLET ORAL EVERY 4 HOURS PRN
Status: CANCELLED | OUTPATIENT
Start: 2022-11-09

## 2022-11-09 RX ORDER — MIDAZOLAM HYDROCHLORIDE 1 MG/ML
INJECTION, SOLUTION INTRAMUSCULAR; INTRAVENOUS
Status: DISCONTINUED | OUTPATIENT
Start: 2022-11-09 | End: 2022-11-09

## 2022-11-09 RX ORDER — LIDOCAINE HYDROCHLORIDE 20 MG/ML
INJECTION INTRAVENOUS
Status: DISCONTINUED | OUTPATIENT
Start: 2022-11-09 | End: 2022-11-09

## 2022-11-09 RX ORDER — CEFAZOLIN SODIUM 2 G/50ML
2 SOLUTION INTRAVENOUS
Status: COMPLETED | OUTPATIENT
Start: 2022-11-09 | End: 2022-11-09

## 2022-11-09 RX ORDER — DIPHENHYDRAMINE HCL 25 MG
25 CAPSULE ORAL EVERY 4 HOURS PRN
Status: CANCELLED | OUTPATIENT
Start: 2022-11-09

## 2022-11-09 RX ORDER — HYDROMORPHONE HYDROCHLORIDE 2 MG/ML
0.2 INJECTION, SOLUTION INTRAMUSCULAR; INTRAVENOUS; SUBCUTANEOUS EVERY 5 MIN PRN
Status: DISCONTINUED | OUTPATIENT
Start: 2022-11-09 | End: 2022-11-09 | Stop reason: HOSPADM

## 2022-11-09 RX ORDER — SCOLOPAMINE TRANSDERMAL SYSTEM 1 MG/1
1 PATCH, EXTENDED RELEASE TRANSDERMAL ONCE
Status: DISCONTINUED | OUTPATIENT
Start: 2022-11-09 | End: 2022-11-09 | Stop reason: HOSPADM

## 2022-11-09 RX ORDER — DEXAMETHASONE SODIUM PHOSPHATE 4 MG/ML
INJECTION, SOLUTION INTRA-ARTICULAR; INTRALESIONAL; INTRAMUSCULAR; INTRAVENOUS; SOFT TISSUE
Status: DISCONTINUED | OUTPATIENT
Start: 2022-11-09 | End: 2022-11-09

## 2022-11-09 RX ORDER — LIDOCAINE HYDROCHLORIDE 10 MG/ML
INJECTION, SOLUTION EPIDURAL; INFILTRATION; INTRACAUDAL; PERINEURAL
Status: DISCONTINUED | OUTPATIENT
Start: 2022-11-09 | End: 2022-11-09 | Stop reason: HOSPADM

## 2022-11-09 RX ORDER — ROCURONIUM BROMIDE 10 MG/ML
INJECTION, SOLUTION INTRAVENOUS
Status: DISCONTINUED | OUTPATIENT
Start: 2022-11-09 | End: 2022-11-09

## 2022-11-09 RX ORDER — ONDANSETRON 2 MG/ML
4 INJECTION INTRAMUSCULAR; INTRAVENOUS ONCE AS NEEDED
Status: DISCONTINUED | OUTPATIENT
Start: 2022-11-09 | End: 2022-11-09 | Stop reason: HOSPADM

## 2022-11-09 RX ORDER — SODIUM CHLORIDE 0.9 % (FLUSH) 0.9 %
3 SYRINGE (ML) INJECTION
Status: DISCONTINUED | OUTPATIENT
Start: 2022-11-09 | End: 2022-11-09 | Stop reason: HOSPADM

## 2022-11-09 RX ORDER — HYDROCODONE BITARTRATE AND ACETAMINOPHEN 10; 325 MG/1; MG/1
1 TABLET ORAL EVERY 4 HOURS PRN
Status: CANCELLED | OUTPATIENT
Start: 2022-11-09

## 2022-11-09 RX ORDER — IBUPROFEN 600 MG/1
600 TABLET ORAL EVERY 6 HOURS PRN
Qty: 20 TABLET | Refills: 0 | Status: SHIPPED | OUTPATIENT
Start: 2022-11-09

## 2022-11-09 RX ORDER — DEXMEDETOMIDINE HYDROCHLORIDE 100 UG/ML
INJECTION, SOLUTION INTRAVENOUS
Status: DISCONTINUED | OUTPATIENT
Start: 2022-11-09 | End: 2022-11-09

## 2022-11-09 RX ORDER — DIPHENHYDRAMINE HYDROCHLORIDE 50 MG/ML
25 INJECTION INTRAMUSCULAR; INTRAVENOUS EVERY 4 HOURS PRN
Status: CANCELLED | OUTPATIENT
Start: 2022-11-09

## 2022-11-09 RX ORDER — ONDANSETRON 8 MG/1
8 TABLET, ORALLY DISINTEGRATING ORAL EVERY 8 HOURS PRN
Status: CANCELLED | OUTPATIENT
Start: 2022-11-09

## 2022-11-09 RX ORDER — ACETAMINOPHEN 10 MG/ML
INJECTION, SOLUTION INTRAVENOUS
Status: DISCONTINUED | OUTPATIENT
Start: 2022-11-09 | End: 2022-11-09

## 2022-11-09 RX ORDER — SODIUM CHLORIDE 9 MG/ML
INJECTION, SOLUTION INTRAVENOUS CONTINUOUS
Status: DISCONTINUED | OUTPATIENT
Start: 2022-11-09 | End: 2022-11-09 | Stop reason: HOSPADM

## 2022-11-09 RX ADMIN — SUGAMMADEX 200 MG: 100 INJECTION, SOLUTION INTRAVENOUS at 12:11

## 2022-11-09 RX ADMIN — HYDROMORPHONE HYDROCHLORIDE 0.2 MG: 2 INJECTION, SOLUTION INTRAMUSCULAR; INTRAVENOUS; SUBCUTANEOUS at 01:11

## 2022-11-09 RX ADMIN — MIDAZOLAM 2 MG: 1 INJECTION INTRAMUSCULAR; INTRAVENOUS at 11:11

## 2022-11-09 RX ADMIN — FENTANYL CITRATE 100 MCG: 50 INJECTION, SOLUTION INTRAMUSCULAR; INTRAVENOUS at 12:11

## 2022-11-09 RX ADMIN — PROPOFOL 200 MG: 10 INJECTION, EMULSION INTRAVENOUS at 12:11

## 2022-11-09 RX ADMIN — ROCURONIUM BROMIDE 50 MG: 10 INJECTION, SOLUTION INTRAVENOUS at 12:11

## 2022-11-09 RX ADMIN — DEXMEDETOMIDINE HYDROCHLORIDE 10 MCG: 100 INJECTION, SOLUTION, CONCENTRATE INTRAVENOUS at 12:11

## 2022-11-09 RX ADMIN — ONDANSETRON 4 MG: 2 INJECTION, SOLUTION INTRAMUSCULAR; INTRAVENOUS at 12:11

## 2022-11-09 RX ADMIN — DEXMEDETOMIDINE HYDROCHLORIDE 6 MCG: 100 INJECTION, SOLUTION, CONCENTRATE INTRAVENOUS at 12:11

## 2022-11-09 RX ADMIN — LIDOCAINE HYDROCHLORIDE 100 MG: 20 INJECTION, SOLUTION INTRAVENOUS at 12:11

## 2022-11-09 RX ADMIN — PROPOFOL 100 MG: 10 INJECTION, EMULSION INTRAVENOUS at 12:11

## 2022-11-09 RX ADMIN — CEFAZOLIN SODIUM 2 G: 2 SOLUTION INTRAVENOUS at 12:11

## 2022-11-09 RX ADMIN — PROPOFOL 50 MG: 10 INJECTION, EMULSION INTRAVENOUS at 12:11

## 2022-11-09 RX ADMIN — ACETAMINOPHEN 1000 MG: 10 INJECTION, SOLUTION INTRAVENOUS at 12:11

## 2022-11-09 RX ADMIN — SODIUM CHLORIDE, SODIUM LACTATE, POTASSIUM CHLORIDE, AND CALCIUM CHLORIDE: .6; .31; .03; .02 INJECTION, SOLUTION INTRAVENOUS at 11:11

## 2022-11-09 RX ADMIN — SODIUM CHLORIDE, SODIUM LACTATE, POTASSIUM CHLORIDE, AND CALCIUM CHLORIDE: .6; .31; .03; .02 INJECTION, SOLUTION INTRAVENOUS at 12:11

## 2022-11-09 RX ADMIN — DEXAMETHASONE SODIUM PHOSPHATE 4 MG: 4 INJECTION, SOLUTION INTRA-ARTICULAR; INTRALESIONAL; INTRAMUSCULAR; INTRAVENOUS; SOFT TISSUE at 12:11

## 2022-11-09 NOTE — ANESTHESIA PREPROCEDURE EVALUATION
11/09/2022  Thelma Roman is a 27 y.o., female.      Pre-op Assessment    I have reviewed the Patient Summary Reports.     I have reviewed the Nursing Notes.    I have reviewed the Medications.     Review of Systems  Anesthesia Hx:  Denies Family Hx of Anesthesia complications.   Denies Personal Hx of Anesthesia complications.     Patient Active Problem List   Diagnosis    Moderate episode of recurrent major depressive disorder    Generalized anxiety disorder    Cervical strain    Tension headache    Class 1 obesity due to excess calories without serious comorbidity with body mass index (BMI) of 33.0 to 33.9 in adult    Herniation of intervertebral disc at C5-C6 level    Wrist pain, acute, right    Decreased ROM of wrist    Wrist weakness    Request for sterilization       Past Medical History:   Diagnosis Date    Acute appendicitis 10/30/2020    Anxiety     Chronic pain of right knee 11/18/2018    Depression     Panic disorder without agoraphobia 9/16/2015       ECHO: No results found for this or any previous visit.      There is no height or weight on file to calculate BMI.    Tobacco Use: Low Risk     Smoking Tobacco Use: Never    Smokeless Tobacco Use: Never    Passive Exposure: Not on file       Social History     Substance and Sexual Activity   Drug Use No        Alcohol Use: Not At Risk    Frequency of Alcohol Consumption: Never    Average Number of Drinks: Patient refused    Frequency of Binge Drinking: Never         Airway:  No value filed.    Physical Exam    Airway:  Mouth Opening: Normal  TM Distance: Normal          Anesthesia Plan  Type of Anesthesia, risks & benefits discussed:    Anesthesia Type: Gen ETT  Intra-op Monitoring Plan: Standard ASA Monitors  Post Op Pain Control Plan: multimodal analgesia  Induction:  IV  Airway Plan: Video  Informed Consent: Informed consent  signed with the Patient and all parties understand the risks and agree with anesthesia plan.  All questions answered.   ASA Score: 2  Day of Surgery Review of History & Physical: H&P Update referred to the surgeon/provider.    Ready For Surgery From Anesthesia Perspective.     .

## 2022-11-09 NOTE — TRANSFER OF CARE
"Anesthesia Transfer of Care Note    Patient: Thelma Roman    Procedure(s) Performed: Procedure(s) (LRB):  SALPINGECTOMY, LAPAROSCOPIC, REMOVAL OF NEXPLANON  (Bilateral)    Patient location: PACU    Transport from OR: Transported from OR on 6-10 L/min O2 by face mask with adequate spontaneous ventilation    Post pain: adequate analgesia    Post assessment: no apparent anesthetic complications and tolerated procedure well    Post vital signs: stable    Level of consciousness: awake    Nausea/Vomiting: no nausea/vomiting    Complications: none    Transfer of care protocol was followed      Last vitals:   Visit Vitals  BP (!) 145/64   Pulse 110   Temp 36.5 °C (97.7 °F) (Skin)   Resp 17   Ht 5' 9" (1.753 m)   Wt 97.5 kg (215 lb)   LMP 10/28/2022 (Exact Date)   SpO2 100%   Breastfeeding No   BMI 31.75 kg/m²     "

## 2022-11-09 NOTE — PLAN OF CARE
Discharge criteria met,voicing desire to go home. Discharge instructions given to patient & significant other, verbalized understanding. Discharge home via wheelchair in care of significant other.

## 2022-11-09 NOTE — ANESTHESIA PROCEDURE NOTES
Intubation    Date/Time: 11/9/2022 12:02 PM  Performed by: Sherry Jamison CRNA  Authorized by: Lorenzo Ponce Jr., MD     Intubation:     Induction:  Intravenous    Intubated:  Postinduction    Mask Ventilation:  Easy mask    Attempts:  1    Attempted By:  CRNA    Method of Intubation:  Video laryngoscopy    Blade:  Tate 3    Laryngeal View Grade: Grade I - full view of cords      Difficult Airway Encountered?: No      Complications:  None    Airway Device:  Oral endotracheal tube    Airway Device Size:  7.5    Style/Cuff Inflation:  Cuffed (inflated to minimal occlusive pressure)    Tube secured:  22    Secured at:  The teeth    Placement Verified By:  Capnometry    Complicating Factors:  None    Findings Post-Intubation:  BS equal bilateral and atraumatic/condition of teeth unchanged

## 2022-11-09 NOTE — OP NOTE
OPERATIVE REPORT    DATE OF PROCEDURE: 11/09/2022    PREOPERATIVE DIAGNOSIS:   1. Undesired fertility    POSTOPERATIVE DIAGNOSIS:   1. Undesired fertility    PROCEDURE:   1. Laparoscopic Bilateral salpingectomy  2. Nexplanon implant removal    SURGEON: DIANA Rodgers MD    ASSISTANT: Ashley Mendoza LPN    ESTIMATED BLOOD LOSS: 0 mL     INTRAVENOUS FLUIDS: 1500 mL     URINE OUTPUT: 100 mL    SPECIMEN: bilateral fallopian tubes    OPERATIVE FINDINGS: On laparoscopy, normal anteverted uterus. Otherwise, normal tubes and ovaries bilaterally.     PROCEDURE IN DETAIL:   The patient was taken to the Operating Room where general anesthesia was achieved. She was prepped and draped in normal sterile fashion and placed in [facility] stirps. A sponge stick was placed in the vagina and elias catheter in the vagina.     Attention was then turned to the abdomen where a small 5mm infraumbilical skin incision was made with the scalpel. Veress needle was introduced into the abdomen through the umbilicus. Confirmation into the intraabdominal cavity was confirmed with a water drop test. The abdomen was then insufflated to 15 mmHg with CO2 gas. An 5 mm port was then placed at the umbilical site under direct visualization. The camera was used to inspect the abdomen. The patient was placed in trendelenburg. Two 5 mm  lateral skin incisions were made with a scalpel and 5 mm trocars was advanced through theses incisions under visualization of the camera.     The left fallopian tube was then grasped, cauterized, and transected from its mesosalpinx using the ligasure. It was removed throught the 5mm trocar. Hemostasis noted. In a similar fashion the right fallopian tube was grasped, cauterized, and transected from its mesosalpinx using the ligasure. It was removed throught the 5mm trocar. Hemostasis was noted. The procedure was then complete. The abdomen was desufflated. All port sites were removed and 5mm port sites were closed dermabond and  the 11mm site with 4-0 Monocryl. The sponge stick and elias were then removed. The patient tolerated the procedure well. Sponge, lap and needle counts were correct.  Next I proceed with nexplanon removal.   The patient was placed in same position as for insertion. The left arm was prepped with betadine. 1 cc of 1% lidocaine was injected just underneath end of implant closest to the elbow. Downward pressure was placed on the end of the implant closest to the axilla.  Using sterile technique, a 2-3 mm incision was made in longitudinal direction of arm at tip of the implant closest to the elbow. The entire 4 cm implant was removed. The incision site was closed with steri strips and a small adhesive bandage and then pressure bandage was placed over insertion site.  The procedure was then complete.       Shivani Rodgers MD, FACOG  OB/GYN

## 2022-11-09 NOTE — INTERVAL H&P NOTE
The patient has been seen and the H&P has been reviewed: No interval changes since last clinic visit.  Proceed to OR for scheduled procedure- laparoscopic bilateral salpingectomy with nexplanon    Shivani Rodgers MD  OB/GYN            Active Hospital Problems    Diagnosis  POA    *Request for sterilization [Z30.2]  Not Applicable      Resolved Hospital Problems   No resolved problems to display.

## 2022-11-10 NOTE — ANESTHESIA POSTPROCEDURE EVALUATION
Anesthesia Post Evaluation    Patient: Thelma Roman    Procedure(s) Performed: Procedure(s) (LRB):  SALPINGECTOMY, LAPAROSCOPIC, REMOVAL OF NEXPLANON  (Bilateral)    Final Anesthesia Type: general      Patient location during evaluation: PACU  Patient participation: Yes- Able to Participate  Level of consciousness: awake and alert  Post-procedure vital signs: reviewed and stable  Pain management: adequate  Airway patency: patent    PONV status at discharge: No PONV  Anesthetic complications: no      Cardiovascular status: stable  Respiratory status: spontaneous ventilation  Hydration status: euvolemic  Follow-up not needed.          Vitals Value Taken Time   /85 11/09/22 1545   Temp 36.7 °C (98 °F) 11/09/22 1545   Pulse 108 11/09/22 1545   Resp 20 11/09/22 1545   SpO2 96 % 11/09/22 1545         Event Time   Out of Recovery 14:02:26         Pain/Tonya Score: Pain Rating Prior to Med Admin: 5 (11/9/2022  1:45 PM)  Pain Rating Post Med Admin: 2 (11/9/2022  3:45 PM)  Tonya Score: 10 (11/9/2022  3:45 PM)

## 2022-11-10 NOTE — DISCHARGE SUMMARY
[facility] ([facility])  Obstetrics & Gynecology  Discharge Summary    Patient Name: Thelma Roman  MRN: [id number]  Admission Date: 11/9/2022  Hospital Length of Stay: 0 days  Discharge Date:  11/10/2022  Attending Physician: No att. providers found   Discharging Provider: Shivani Rodgers MD  Primary Care Provider: Amy Molina DO      Hospital Course: Patient was admitted for an outpatient procedure (laparoscopic bilateral salpingectomy and nexplanon removal secondary to undesired fertility) and tolerated the procedure well with no complications. Please see operative report for further details. Following the procedure the patient was awakened from anesthesia and transferred to the recovery area in stable condition. Patient was discharged to home once ambulating, voiding, tolerating clear liquids and pain well controlled. Patient given routine post-op instructions and prescriptions for pain medication to take as needed. Patient instructed to follow up with me in 4 weeks for postoperative appointment.       Procedure(s) (LRB):  SALPINGECTOMY, LAPAROSCOPIC, REMOVAL OF NEXPLANON  (Bilateral)       Pending Diagnostic Studies:       Procedure Component Value Units Date/Time    Specimen to Pathology, Surgery Gynecology and Obstetrics [[id number]] Collected: 11/09/22 1233    Order Status: Sent Lab Status: In process Updated: 11/09/22 1234    Specimen: Tissue           Final Active Diagnoses:    Diagnosis Date Noted POA    PRINCIPAL PROBLEM:  Request for sterilization [Z30.2] 10/17/2022 Not Applicable      Problems Resolved During this Admission:        Discharged Condition: good    Disposition: Home or Self Care    Follow Up:   Follow-up Information       Shivani Rodgers MD Follow up in 4 week(s).    Specialties: Obstetrics, Obstetrics and Gynecology  Why: Postoperative visit  Contact information:  [address]  [address] [place] [address] [id number] [phone]                           Patient Instructions:      Diet  Adult Regular     Notify your health care provider if you experience any of the following:  temperature >100.4     Notify your health care provider if you experience any of the following:  persistent nausea and vomiting or diarrhea     Notify your health care provider if you experience any of the following:  severe uncontrolled pain     Notify your health care provider if you experience any of the following:  redness, tenderness, or signs of infection (pain, swelling, redness, odor or green/yellow discharge around incision site)     Notify your health care provider if you experience any of the following:  difficulty breathing or increased cough     Notify your health care provider if you experience any of the following:  severe persistent headache     Notify your health care provider if you experience any of the following:  worsening rash     Notify your health care provider if you experience any of the following:  persistent dizziness, light-headedness, or visual disturbances     Notify your health care provider if you experience any of the following:  increased confusion or weakness     Activity as tolerated     Medications:  Reconciled Home Medications:      Medication List        START taking these medications      HYDROcodone-acetaminophen 5-325 mg per tablet  Commonly known as: NORCO  Take 1 tablet by mouth every 12 (twelve) hours as needed for Pain.     ibuprofen 600 MG tablet  Commonly known as: ADVIL,MOTRIN  Take 1 tablet (600 mg total) by mouth every 6 (six) hours as needed for Pain.            CONTINUE taking these medications      buPROPion 150 MG TB24 tablet  Commonly known as: WELLBUTRIN XL  Take 2 tablets (300 mg total) by mouth once daily.     hydrOXYzine pamoate 25 MG Cap  Commonly known as: VISTARIL  Take 2 capsules (50 mg total) by mouth nightly as needed (insomnia).     * venlafaxine 150 MG Cp24  Commonly known as: EFFEXOR-XR  Take 1 capsule (150 mg total) by mouth once daily.     * venlafaxine  75 MG 24 hr capsule  Commonly known as: EFFEXOR-XR  Take 1 capsule (75 mg total) by mouth once daily.           * This list has 2 medication(s) that are the same as other medications prescribed for you. Read the directions carefully, and ask your doctor or other care provider to review them with you.                  Shivani Rodgers MD  Obstetrics & Gynecology  [facility])

## 2022-11-11 ENCOUNTER — PATIENT MESSAGE (OUTPATIENT)
Dept: OBSTETRICS AND GYNECOLOGY | Facility: CLINIC | Age: 27
End: 2022-11-11
Payer: MEDICAID

## 2022-11-12 ENCOUNTER — PATIENT MESSAGE (OUTPATIENT)
Dept: OBSTETRICS AND GYNECOLOGY | Facility: CLINIC | Age: 27
End: 2022-11-12
Payer: MEDICAID

## 2022-11-15 ENCOUNTER — TELEPHONE (OUTPATIENT)
Dept: OBSTETRICS AND GYNECOLOGY | Facility: CLINIC | Age: 27
End: 2022-11-15
Payer: MEDICAID

## 2022-11-15 NOTE — TELEPHONE ENCOUNTER
Called patient as this is her third message. Would benefit from being added on the schedule if needed for incision check. Unable to clearly see whats going on from imaging.     Shivani Rodgers MD, FACOG  OB/GYN

## 2022-11-15 NOTE — TELEPHONE ENCOUNTER
----- Message from Shivani Rodgers MD sent at 11/15/2022  4:14 PM CST -----  Regarding: postop visit for incision check  Sorry to keep sending you messages but can you assist yanet with scheduling a f/u for an incision check after her surgery last week. She can be added on for whatever day time works best for her    Shivani Rodgers MD, FACOG  OB/GYN

## 2022-11-16 LAB
FINAL PATHOLOGIC DIAGNOSIS: NORMAL
GROSS: NORMAL
Lab: NORMAL

## 2022-11-18 ENCOUNTER — OFFICE VISIT (OUTPATIENT)
Dept: OBSTETRICS AND GYNECOLOGY | Facility: CLINIC | Age: 27
End: 2022-11-18
Payer: MEDICAID

## 2022-11-18 VITALS
DIASTOLIC BLOOD PRESSURE: 88 MMHG | WEIGHT: 220.81 LBS | SYSTOLIC BLOOD PRESSURE: 129 MMHG | BODY MASS INDEX: 32.61 KG/M2

## 2022-11-18 DIAGNOSIS — Z98.890 POST-OPERATIVE STATE: Primary | ICD-10-CM

## 2022-11-18 PROCEDURE — 1160F PR REVIEW ALL MEDS BY PRESCRIBER/CLIN PHARMACIST DOCUMENTED: ICD-10-PCS | Mod: CPTII,,, | Performed by: OBSTETRICS & GYNECOLOGY

## 2022-11-18 PROCEDURE — 3008F BODY MASS INDEX DOCD: CPT | Mod: CPTII,,, | Performed by: OBSTETRICS & GYNECOLOGY

## 2022-11-18 PROCEDURE — 1159F PR MEDICATION LIST DOCUMENTED IN MEDICAL RECORD: ICD-10-PCS | Mod: CPTII,,, | Performed by: OBSTETRICS & GYNECOLOGY

## 2022-11-18 PROCEDURE — 1159F MED LIST DOCD IN RCRD: CPT | Mod: CPTII,,, | Performed by: OBSTETRICS & GYNECOLOGY

## 2022-11-18 PROCEDURE — 3074F SYST BP LT 130 MM HG: CPT | Mod: CPTII,,, | Performed by: OBSTETRICS & GYNECOLOGY

## 2022-11-18 PROCEDURE — 99024 PR POST-OP FOLLOW-UP VISIT: ICD-10-PCS | Mod: ,,, | Performed by: OBSTETRICS & GYNECOLOGY

## 2022-11-18 PROCEDURE — 99213 OFFICE O/P EST LOW 20 MIN: CPT | Mod: PBBFAC,PO | Performed by: OBSTETRICS & GYNECOLOGY

## 2022-11-18 PROCEDURE — 1160F RVW MEDS BY RX/DR IN RCRD: CPT | Mod: CPTII,,, | Performed by: OBSTETRICS & GYNECOLOGY

## 2022-11-18 PROCEDURE — 99024 POSTOP FOLLOW-UP VISIT: CPT | Mod: ,,, | Performed by: OBSTETRICS & GYNECOLOGY

## 2022-11-18 PROCEDURE — 99999 PR PBB SHADOW E&M-EST. PATIENT-LVL III: ICD-10-PCS | Mod: PBBFAC,,, | Performed by: OBSTETRICS & GYNECOLOGY

## 2022-11-18 PROCEDURE — 3079F DIAST BP 80-89 MM HG: CPT | Mod: CPTII,,, | Performed by: OBSTETRICS & GYNECOLOGY

## 2022-11-18 PROCEDURE — 3079F PR MOST RECENT DIASTOLIC BLOOD PRESSURE 80-89 MM HG: ICD-10-PCS | Mod: CPTII,,, | Performed by: OBSTETRICS & GYNECOLOGY

## 2022-11-18 PROCEDURE — 99999 PR PBB SHADOW E&M-EST. PATIENT-LVL III: CPT | Mod: PBBFAC,,, | Performed by: OBSTETRICS & GYNECOLOGY

## 2022-11-18 PROCEDURE — 3008F PR BODY MASS INDEX (BMI) DOCUMENTED: ICD-10-PCS | Mod: CPTII,,, | Performed by: OBSTETRICS & GYNECOLOGY

## 2022-11-18 PROCEDURE — 3074F PR MOST RECENT SYSTOLIC BLOOD PRESSURE < 130 MM HG: ICD-10-PCS | Mod: CPTII,,, | Performed by: OBSTETRICS & GYNECOLOGY

## 2022-11-18 NOTE — PROGRESS NOTES
CC: Postoperative visit    Thelma Roman is a 27 y.o. female  presents for a postoperative visit s/p Laparoscopic bilateral salpingectomy and nexplanon removal on 2022.  Her postoperative course was uncomplicated.  She is doing well postoperatively but wasn't sure if incisions were healing currectly    Pathology showed:  FALLOPIAN TUBES, BILATERAL, SALPINGECTOMIES:   - Complete cross-sections of benign bilateral fallopian tubes and fimbriated   ends.     /88   Wt 100.2 kg (220 lb 12.8 oz)   LMP 10/28/2022 (Exact Date)   BMI 32.61 kg/m²     ROS:  GENERAL: No fever, chills, fatigability or weight loss.  VULVAR: No pain, no lesions and no itching.  VAGINAL: No relaxation, no itching, no discharge, no abnormal bleeding and no lesions.  ABDOMEN: No abdominal pain. Denies nausea. Denies vomiting. No diarrhea. No constipation  BREAST: Denies pain. No lumps. No discharge.  URINARY: No incontinence, no nocturia, no frequency and no dysuria.  CARDIOVASCULAR: No chest pain. No shortness of breath. No leg cramps.  NEUROLOGICAL: No headaches. No vision changes.    Physical Exam:  GENERAL: alert, appears stated age and cooperative  NEUROLOGIC: orientated to person, place and time, normal mood and affect   CHEST: Normal respiratory effort  NECK: normal appearance  SKIN: no acne, striae, hirsutism  ABDOMEN: abdomen is soft without significant tenderness  INCISION: c/d/i- no dehiscence or signs of infection        1. Post-operative state            Given routine precautions and instructed on wound care    Shivani Rodgers MD, FACOG  OB/GYN

## 2023-01-09 ENCOUNTER — TELEPHONE (OUTPATIENT)
Dept: PSYCHIATRY | Facility: CLINIC | Age: 28
End: 2023-01-09
Payer: MEDICAID

## 2023-02-06 ENCOUNTER — OFFICE VISIT (OUTPATIENT)
Dept: PSYCHIATRY | Facility: CLINIC | Age: 28
End: 2023-02-06
Payer: MEDICAID

## 2023-02-06 DIAGNOSIS — F41.1 GENERALIZED ANXIETY DISORDER: Primary | ICD-10-CM

## 2023-02-06 DIAGNOSIS — F41.0 GENERALIZED ANXIETY DISORDER WITH PANIC ATTACKS: ICD-10-CM

## 2023-02-06 DIAGNOSIS — F41.1 GENERALIZED ANXIETY DISORDER WITH PANIC ATTACKS: ICD-10-CM

## 2023-02-06 DIAGNOSIS — F43.10 PTSD (POST-TRAUMATIC STRESS DISORDER): ICD-10-CM

## 2023-02-06 DIAGNOSIS — F32.1 CURRENT MODERATE EPISODE OF MAJOR DEPRESSIVE DISORDER, UNSPECIFIED WHETHER RECURRENT: ICD-10-CM

## 2023-02-06 DIAGNOSIS — F33.1 MODERATE EPISODE OF RECURRENT MAJOR DEPRESSIVE DISORDER: ICD-10-CM

## 2023-02-06 RX ORDER — BUPROPION HYDROCHLORIDE 150 MG/1
150 TABLET ORAL DAILY
Qty: 30 TABLET | Refills: 3 | Status: SHIPPED | OUTPATIENT
Start: 2023-02-06 | End: 2023-05-05 | Stop reason: SDUPTHER

## 2023-02-06 RX ORDER — VENLAFAXINE HYDROCHLORIDE 150 MG/1
150 CAPSULE, EXTENDED RELEASE ORAL DAILY
Qty: 30 CAPSULE | Refills: 3 | Status: SHIPPED | OUTPATIENT
Start: 2023-02-06 | End: 2023-05-08 | Stop reason: SDUPTHER

## 2023-02-06 RX ORDER — BUPROPION HYDROCHLORIDE 300 MG/1
300 TABLET ORAL DAILY
Qty: 30 TABLET | Refills: 3 | Status: SHIPPED | OUTPATIENT
Start: 2023-02-06 | End: 2023-05-05 | Stop reason: SDUPTHER

## 2023-02-06 NOTE — PROGRESS NOTES
"  2/6/2023 3:22 PM   Thelma Roman   1995   [id number]           OUTPATIENT PSYCHIATRY FOLLOW- UP VISIT    Reason for Encounter:  Thelma Roman, a 27 y.o. female,who presents today for follow up of depression, anxiety, PTSD.  Met with patient.    TELE PSYCHIATRY Disclaimer   *The patient was informed despite using HIPPA compliant technology there may be risks including security breach, technological failure, inability to perform a comprehensive physical exam which could delay or prevent an accurate diagnosis, and potential complications from treatment decisions rendered over a telemedical platform. The patient understands and consented to the use of tele-health service as being a safe measure to mitigate during COVID 19 Pandemic .  The patient was also informed of the relationship between the physician and patient and the respective role of any other health care provider with respect to management of the patient; and notified that the pt may decline to receive medical services by telemedicine and may withdraw from such care at any time.     Patient's Current location:  [address] [place] [address] [address] [place] [address]  In Case of Emergency pts next of kin   Figueroa Rivers (Father)    [phone] (Home Phone)  Visit type: Virtual visit with synchronous audio and video  Total time spent with patient: 30 mins       Interval History and Content of Current Session:  States trip to [place] went well, got engaged. Also got fired from job (states was accused of something she didn't do, prompting bad customer reviews), has new job one week ago working at [employer], works nights. Also enrolled "impulsively" in school for cybersecurity. Voices difficulty with settling on a career, feels like interests change frequently, has several degrees which she cannot use. Still working on getting in with therapist, referral placed.   Compliant with medications, no side effects voiced.    Sleeping well, mood dysthymic/stressed. Still " "struggling energy/concentration. Eating well. Denies excessive/increased alcohol or drug use.  Not exercising, but states "on my feet" a lot during work, encouraged regular exercise, healthy eating.  No SI/HI/AVH. Affect is reactive/appropriate.          Psychiatric Review Of Systems - Is patient experiencing or having changes in:      sleep: No   weight: unknown  energy/anergy: Yes  interest/pleasure/anhedonia: Yes  somatic symptoms: no  guilty/hopelessness: no  concentration: no   S.I.B.s/risky behavior: no  SI/SA:  no    anxiety/panic: some improvement   Agoraphobia:  no  Social phobia:  no  Recurrent nightmares:  Yes   hyper startle response:  yes  Avoidance: yes  Recurrent thoughts:  no  Recurrent behaviors:  no    Irritability: yes  Racing thoughts: no  Impulsive behaviors: no  Pressured speech:  no    Paranoia:no  Delusions: no  AVH:no        Risk Parameters:  Patient reports no suicidal ideation  Patient reports no homicidal ideation  Patient reports no self-injurious behavior  Patient reports no violent behavior    Psychotropic medication review      Current meds:  -Effexor 150mg daily  -Wellbutrin XL 150mg daily  -Vistaril 25mg PRN    Compliance: yes    Side effects: None      Substance use  Tobacco- denies   ETOH- denies  Illicit substances- denies     Review of Systems     Past Medical, Family and Social History: The patient's past medical, family and social history have been reviewed and updated as appropriate within the electronic medical record - see encounter notes.    Medical Review of Symptoms  History obtained from the patient  General : NO chills or fever  Respiratory: NO cough, shortness of breath  Cardiovascular: NO chest pain, palpitations or racing heart  Gastrointestinal: NO nausea, vomiting, constipation or diarrhea  Neurological: NO confusion, dizziness, headaches or tremors  Psychiatric: please see HPI     Objective     ALL MEDICATIONS:    Current Outpatient Medications:     buPROPion " "(WELLBUTRIN XL) 150 MG TB24 tablet, Take 2 tablets (300 mg total) by mouth once daily., Disp: 30 tablet, Rfl: 3    hydrOXYzine pamoate (VISTARIL) 25 MG Cap, Take 2 capsules (50 mg total) by mouth nightly as needed (insomnia)., Disp: 60 capsule, Rfl: 3    ibuprofen (ADVIL,MOTRIN) 600 MG tablet, Take 1 tablet (600 mg total) by mouth every 6 (six) hours as needed for Pain., Disp: 20 tablet, Rfl: 0    venlafaxine (EFFEXOR-XR) 150 MG Cp24, Take 1 capsule (150 mg total) by mouth once daily., Disp: 30 capsule, Rfl: 3    ALLERGIES:  Review of patient's allergies indicates:  No Known Allergies    RELEVANT LABS/STUDIES:    Lab Results   Component Value Date    WBC 9.24 11/09/2022    HGB 14.0 11/09/2022    HCT 42.6 11/09/2022    MCV 87 11/09/2022     11/09/2022     BMP  Lab Results   Component Value Date     05/18/2022    K 4.4 05/18/2022     05/18/2022    CO2 24 05/18/2022    BUN 12 05/18/2022    CREATININE 0.9 05/18/2022    CALCIUM 10.5 05/18/2022    ANIONGAP 12 05/18/2022    ESTGFRAFRICA >60 05/18/2022    EGFRNONAA >60 05/18/2022     Lab Results   Component Value Date    ALT 12 05/18/2022    AST 16 05/18/2022    ALKPHOS 96 05/18/2022    BILITOT 0.3 05/18/2022     Lab Results   Component Value Date    TSH 1.500 05/04/2021     Lab Results   Component Value Date    HGBA1C 5.4 05/04/2021       Constitutional  Vitals:    There were no vitals filed for this visit.         PHYSICAL EXAM  General: well developed, well nourished  Neurologic:   Gait: Normal   Psychomotor signs:  No involuntary movements or tremor  AIMS: none    PSYCHIATRIC EXAM:     Mental Status Exam:  Appearance: unremarkable, age appropriate  Behavior/Cooperation: normal, cooperative  Speech: normal tone, normal rate, normal pitch, normal volume  Language: uses words appropriately; NO aphasia or dysarthria  Mood: "stressed"  Affect:  Appropriate, mildly constricted   Thought Process: normal and logical  Thought Content: normal, no suicidality, " no homicidality, delusions, or paranoia  Level of Consciousness: Alert and Oriented x3  Memory:  Intact  Attention/concentration: appropriate for age/education.   Fund of Knowledge: appears adequate  Insight:  Good   Judgment: good     Assessment and Diagnosis   Status/Progress:  Thelma Roman is a 26 y.o. female with a past psychiatric history of unspecified depression, anxiety, and trauma who presented to clinic on 10/29/2021. Pt meets criteria for PTSD with prominent sx of nightmare, hyperarousal, flashback, hypervigilance. Pt also with sx of anxiety with panic attack and depression. Likely hx of TBI. Mood symptoms stable overall, but notices significant worsening in 1-2 weeks prior to menstrual period. Will increase Wellbutrin today to augment treatment of symptoms of low-energy/poor concentration.  Based on the examination today, the patient's problem(s) is/are adequately but not ideally controlled.  New problems have not been presented today.   Co-morbidities are not complicating management of the primary condition. Patient seeing some benefits from initiation of venlafaxine but continues to have significant issues with anxiety, muscle tension, etc. Patient with good insight and progressing professionally at this time. Favorable prognosis should she continue on current track.         General Impression:       ICD-10-CM ICD-9-CM   1. Generalized anxiety disorder  F41.1 300.02   2. Moderate episode of recurrent major depressive disorder  F33.1 296.32           Intervention/Counseling/Treatment Plan   Medication Management:   Increase bupropion XL to 450mg daily-refilled today  Continue venlafaxine XR 150mg daily-refilled today  Increase vistaril to 50mg PRN for insomnia-refilled today    Labs: reviewed most recent  The treatment plan and follow up plan were reviewed with the patient.  Discussed with patient informed consent, risks vs. benefits, alternative treatments, side effect profile and the inherent  unpredictability of individual responses to these treatments. The patient expresses understanding of the above and displays the capacity to agree with this current plan and had no other questions.  Encouraged Patient to keep future appointments.   Take medications as prescribed and abstain from substance abuse.   In the event of an emergency patient was advised to go to the emergency room.    Return to Clinic: 2 months or sooner PRN    > than 50% of total time spend on coordination of care and counseling   (which included pts differential diagnosis and prognosis for psychiatric conditions, risks, benefits of treatments, instructions and adherence to treatment plan, risk reduction, reviewing current psychiatric medication regimen, medical problems and social stressors. In addtion to possible discussion with other healthcare provider/s)    Add on Psychotherapy time:0  Total Face time: 30 minutes     Trent Wiedemann, MD  [facility] PGY-3  [facility]

## 2023-03-28 ENCOUNTER — PATIENT MESSAGE (OUTPATIENT)
Dept: PSYCHIATRY | Facility: CLINIC | Age: 28
End: 2023-03-28
Payer: MEDICAID

## 2023-04-22 ENCOUNTER — HOSPITAL ENCOUNTER (EMERGENCY)
Facility: OTHER | Age: 28
Discharge: HOME | End: 2023-04-22
Attending: EMERGENCY MEDICINE
Payer: MEDICAID

## 2023-04-22 VITALS
BODY MASS INDEX: 31.84 KG/M2 | RESPIRATION RATE: 18 BRPM | HEART RATE: 97 BPM | TEMPERATURE: 99 F | DIASTOLIC BLOOD PRESSURE: 77 MMHG | SYSTOLIC BLOOD PRESSURE: 126 MMHG | WEIGHT: 215 LBS | OXYGEN SATURATION: 99 % | HEIGHT: 69 IN

## 2023-04-22 DIAGNOSIS — R42 DIZZINESS: Primary | ICD-10-CM

## 2023-04-22 DIAGNOSIS — R00.0 TACHYCARDIA: ICD-10-CM

## 2023-04-22 LAB
ANION GAP SERPL CALC-SCNC: 8 MMOL/L (ref 8–16)
B-HCG UR QL: NEGATIVE
BASOPHILS # BLD AUTO: 0.06 K/UL (ref 0–0.2)
BASOPHILS NFR BLD: 0.6 % (ref 0–1.9)
BUN SERPL-MCNC: 15 MG/DL (ref 6–20)
CALCIUM SERPL-MCNC: 10 MG/DL (ref 8.7–10.5)
CHLORIDE SERPL-SCNC: 105 MMOL/L (ref 95–110)
CO2 SERPL-SCNC: 24 MMOL/L (ref 23–29)
CREAT SERPL-MCNC: 1 MG/DL (ref 0.5–1.4)
CTP QC/QA: YES
DIFFERENTIAL METHOD: NORMAL
EOSINOPHIL # BLD AUTO: 0.1 K/UL (ref 0–0.5)
EOSINOPHIL NFR BLD: 1.4 % (ref 0–8)
ERYTHROCYTE [DISTWIDTH] IN BLOOD BY AUTOMATED COUNT: 13.2 % (ref 11.5–14.5)
EST. GFR  (NO RACE VARIABLE): >60 ML/MIN/1.73 M^2
GLUCOSE SERPL-MCNC: 100 MG/DL (ref 70–110)
HCT VFR BLD AUTO: 44.5 % (ref 37–48.5)
HGB BLD-MCNC: 14.6 G/DL (ref 12–16)
IMM GRANULOCYTES # BLD AUTO: 0.03 K/UL (ref 0–0.04)
IMM GRANULOCYTES NFR BLD AUTO: 0.3 % (ref 0–0.5)
LYMPHOCYTES # BLD AUTO: 2.9 K/UL (ref 1–4.8)
LYMPHOCYTES NFR BLD: 30.2 % (ref 18–48)
MCH RBC QN AUTO: 29.4 PG (ref 27–31)
MCHC RBC AUTO-ENTMCNC: 32.8 G/DL (ref 32–36)
MCV RBC AUTO: 90 FL (ref 82–98)
MONOCYTES # BLD AUTO: 0.9 K/UL (ref 0.3–1)
MONOCYTES NFR BLD: 9.2 % (ref 4–15)
NEUTROPHILS # BLD AUTO: 5.5 K/UL (ref 1.8–7.7)
NEUTROPHILS NFR BLD: 58.3 % (ref 38–73)
NRBC BLD-RTO: 0 /100 WBC
PLATELET # BLD AUTO: 321 K/UL (ref 150–450)
PMV BLD AUTO: 10.1 FL (ref 9.2–12.9)
POTASSIUM SERPL-SCNC: 5 MMOL/L (ref 3.5–5.1)
RBC # BLD AUTO: 4.96 M/UL (ref 4–5.4)
SODIUM SERPL-SCNC: 137 MMOL/L (ref 136–145)
WBC # BLD AUTO: 9.49 K/UL (ref 3.9–12.7)

## 2023-04-22 PROCEDURE — 25000003 PHARM REV CODE 250: Performed by: EMERGENCY MEDICINE

## 2023-04-22 PROCEDURE — 93005 ELECTROCARDIOGRAM TRACING: CPT

## 2023-04-22 PROCEDURE — 93010 EKG 12-LEAD: ICD-10-PCS | Mod: ,,, | Performed by: INTERNAL MEDICINE

## 2023-04-22 PROCEDURE — 99284 EMERGENCY DEPT VISIT MOD MDM: CPT | Mod: 25

## 2023-04-22 PROCEDURE — 96374 THER/PROPH/DIAG INJ IV PUSH: CPT

## 2023-04-22 PROCEDURE — 93010 ELECTROCARDIOGRAM REPORT: CPT | Mod: ,,, | Performed by: INTERNAL MEDICINE

## 2023-04-22 PROCEDURE — 80048 BASIC METABOLIC PNL TOTAL CA: CPT | Performed by: EMERGENCY MEDICINE

## 2023-04-22 PROCEDURE — 63600175 PHARM REV CODE 636 W HCPCS: Performed by: EMERGENCY MEDICINE

## 2023-04-22 PROCEDURE — 81025 URINE PREGNANCY TEST: CPT | Performed by: EMERGENCY MEDICINE

## 2023-04-22 PROCEDURE — 96361 HYDRATE IV INFUSION ADD-ON: CPT

## 2023-04-22 PROCEDURE — 85025 COMPLETE CBC W/AUTO DIFF WBC: CPT | Performed by: EMERGENCY MEDICINE

## 2023-04-22 RX ORDER — LORAZEPAM 2 MG/ML
1 INJECTION INTRAMUSCULAR
Status: COMPLETED | OUTPATIENT
Start: 2023-04-22 | End: 2023-04-22

## 2023-04-22 RX ORDER — SODIUM CHLORIDE 9 MG/ML
1000 INJECTION, SOLUTION INTRAVENOUS
Status: COMPLETED | OUTPATIENT
Start: 2023-04-22 | End: 2023-04-22

## 2023-04-22 RX ADMIN — SODIUM CHLORIDE 1000 ML: 9 INJECTION, SOLUTION INTRAVENOUS at 02:04

## 2023-04-22 RX ADMIN — SODIUM CHLORIDE 1000 ML: 9 INJECTION, SOLUTION INTRAVENOUS at 05:04

## 2023-04-22 RX ADMIN — LORAZEPAM 1 MG: 2 INJECTION INTRAMUSCULAR; INTRAVENOUS at 02:04

## 2023-04-22 NOTE — Clinical Note
"Thelma Munroe" Abel was seen and treated in our emergency department on 4/22/2023.  She may return to work on 04/23/2023.       If you have any questions or concerns, please don't hesitate to call.      Warren Traore RN    "

## 2023-04-22 NOTE — ED TRIAGE NOTES
"Patient to ED with c/o " dizziness x 1 hr " with nausea that worsens from sitting to standing position.   "

## 2023-04-22 NOTE — ED PROVIDER NOTES
"Encounter Date: 4/22/2023    SCRIBE #1 NOTE: I, Cris Altamirano, am scribing for, and in the presence of,  Cydney Gomez MD.     History     Chief Complaint   Patient presents with    Dizziness     Patient to ED with c/o " dizziness and nausea x 1 hr  " that worsens when going from sitting to standing .      Thelma Roman is a 27 y.o. female, with a PMHx of anxiety and depression, who presents to the ED for evaluation of dizziness onset 1-2 hours ago. PT reports her dizziness worsens with positional changes, particularly when moving from sitting to standing. Endorses associated nausea, headache, shakiness and generally feeling "on edge" all day. She initially reported anxiety about needles while here in the ED but now states "I'm just freaked out by the fact that I'm freaked out." Per friend at bedside, reports she typically has similar symptoms with previous panic attacks when she does not take her medications although she states she has been compliant with her Wellbutrin and Effexor. No new medications. She denies recent illness and states she was otherwise at her normal state of health prior to symptom onset. She denies changes in P/O and has been able to self hydrate. This is the extent of the patient's complaints at this time.    The history is provided by the patient.   Review of patient's allergies indicates:  No Known Allergies  Past Medical History:   Diagnosis Date    Acute appendicitis 10/30/2020    Anxiety     Chronic pain of right knee 11/18/2018    Depression     Panic disorder without agoraphobia 9/16/2015     Past Surgical History:   Procedure Laterality Date    CARPAL TUNNEL RELEASE      FACIAL RECONSTRUCTION SURGERY      LAPAROSCOPIC APPENDECTOMY N/A 10/31/2020    Procedure: APPENDECTOMY, LAPAROSCOPIC;  Surgeon: Steve Saucedo Jr., MD;  Location: [facility] OR;  Service: General;  Laterality: N/A;    LAPAROSCOPIC SALPINGECTOMY Bilateral 11/9/2022    Procedure: SALPINGECTOMY, LAPAROSCOPIC, REMOVAL OF NEXPLANON " ;  Surgeon: Shivani Rodgers MD;  Location: [facility] OR;  Service: OB/GYN;  Laterality: Bilateral;    TONSILLECTOMY       Family History   Problem Relation Age of Onset    No Known Problems Mother     Breast cancer Neg Hx     Ovarian cancer Neg Hx      Social History     Tobacco Use    Smoking status: Never    Smokeless tobacco: Never   Substance Use Topics    Alcohol use: No    Drug use: No     Review of Systems   Constitutional:  Negative for chills and fever.   HENT:  Negative for congestion and sore throat.    Eyes:  Negative for visual disturbance.   Respiratory:  Negative for cough and shortness of breath.    Cardiovascular:  Negative for chest pain and palpitations.   Gastrointestinal:  Positive for nausea. Negative for abdominal pain, diarrhea and vomiting.   Genitourinary:  Negative for decreased urine volume, dysuria and vaginal discharge.   Musculoskeletal:  Negative for joint swelling, neck pain and neck stiffness.   Skin:  Negative for rash and wound.   Neurological:  Positive for dizziness, tremors and headaches. Negative for weakness and numbness.   Psychiatric/Behavioral:  Negative for confusion. The patient is nervous/anxious.      Physical Exam     Initial Vitals [04/22/23 0214]   BP Pulse Resp Temp SpO2   128/88 93 16 98.7 °F (37.1 °C) 100 %      MAP       --         Physical Exam    Constitutional: She appears well-developed and well-nourished. She does not have a sickly appearance. No distress.   HENT:   Head: Normocephalic and atraumatic.   Right Ear: External ear normal.   Left Ear: External ear normal.   Eyes: Conjunctivae, EOM and lids are normal. Right eye exhibits no discharge. Left eye exhibits no discharge. Right conjunctiva is not injected. Right conjunctiva has no hemorrhage. Left conjunctiva is not injected. Left conjunctiva has no hemorrhage. No scleral icterus.   Neck: Phonation normal. No stridor present. No tracheal deviation present.   Normal range of motion.  Cardiovascular:  Regular  rhythm and normal heart sounds.   Tachycardia present.   Exam reveals no friction rub.       No murmur heard.  Pulses:       Radial pulses are 2+ on the right side and 2+ on the left side.        Dorsalis pedis pulses are 2+ on the right side and 2+ on the left side.   Pulmonary/Chest: Breath sounds normal. No respiratory distress. She has no wheezes. She has no rales.   Musculoskeletal:      Cervical back: Normal range of motion.     Neurological: She is alert and oriented to person, place, and time. She has normal strength. GCS eye subscore is 4. GCS verbal subscore is 5. GCS motor subscore is 6.   Skin: Skin is warm.   Psychiatric: Her speech is normal. Judgment and thought content normal. Her mood appears anxious. Cognition and memory are normal.       ED Course   Procedures  Labs Reviewed   CBC W/ AUTO DIFFERENTIAL   BASIC METABOLIC PANEL   POCT URINE PREGNANCY     EKG Readings: (Independently Interpreted)   Heart Rate: 114. Ectopy: No Ectopy.   No significant ST T wave changes.  Normal intervals.     Imaging Results    None          Medications   sodium chloride 0.9% bolus 1,000 mL 1,000 mL (0 mLs Intravenous Stopped 4/22/23 0338)   LORazepam injection 1 mg (1 mg Intravenous Given 4/22/23 0257)   0.9%  NaCl infusion (0 mLs Intravenous Stopped 4/22/23 0603)     Medical Decision Making:   History:   Old Medical Records: I decided to obtain old medical records.  Clinical Tests:   Lab Tests: Ordered and Reviewed  Additional MDM:   Comments: 27-year-old female with history of anxiety, depression, and panic disorder presents with report of postural lightheadedness which started approximately an hour ago.  Triage vital signs within normal limits.  Time of my evaluation the patient appeared very anxious was tachycardic but her exam was otherwise unremarkable.  Orthostatic vital signs were positive and 1 L of IV fluids ordered.  Will also give Ativan and obtain labs and upt.      After initial L of IV fluids, the  patient reported improvement in her dizziness.  Will give additional L of IV fluids.  The patient's care will be transferred to Dr. Scanlon for re-evaluation..      Scribe Attestation:   Scribe #1: I performed the above scribed service and the documentation accurately describes the services I performed. I attest to the accuracy of the note.    I, Cydney Gomez  , personally performed the services described in this documentation. All medical record entries made by the scribe were at my direction and in my presence. I have reviewed the chart and agree that the record reflects my personal performance and is accurate and complete.      ED Course as of 04/22/23 1692   Sat Apr 22, 2023   0502 CBC and BMP showed no significant abnormalities.  UPT is negative.    On re-evaluation the patient is resting much more comfortably after receiving the Ativan.  However, she still reports some dizziness with laying down after receiving 1 L of IV fluids.  Heart rate is also now elevated in the 120s.  Will give additional L of IV fluids as well as obtain an EKG. [AA]      ED Course User Index  [AA] Cydney Gomez MD                 Clinical Impression:   Final diagnoses:  [R00.0] Tachycardia  [R42] Dizziness (Primary)        ED Disposition Condition    Discharge Stable          ED Prescriptions    None       Follow-up Information       Follow up With Specialties Details Why Contact Info    primary care  Schedule an appointment as soon as possible for a visit       [facility] Emergency Dept Emergency Medicine Go to  If symptoms worsen [address]  [facility] [id number] [phone]             Cydney Gomez MD  04/22/23 9167

## 2023-04-26 ENCOUNTER — HOSPITAL ENCOUNTER (EMERGENCY)
Facility: OTHER | Age: 28
Discharge: HOME | End: 2023-04-26
Attending: EMERGENCY MEDICINE
Payer: MEDICAID

## 2023-04-26 VITALS
WEIGHT: 215 LBS | HEART RATE: 105 BPM | DIASTOLIC BLOOD PRESSURE: 81 MMHG | OXYGEN SATURATION: 100 % | BODY MASS INDEX: 31.84 KG/M2 | HEIGHT: 69 IN | TEMPERATURE: 98 F | SYSTOLIC BLOOD PRESSURE: 122 MMHG | RESPIRATION RATE: 19 BRPM

## 2023-04-26 DIAGNOSIS — R42 DIZZINESS: ICD-10-CM

## 2023-04-26 DIAGNOSIS — R00.2 PALPITATIONS: ICD-10-CM

## 2023-04-26 DIAGNOSIS — F41.0 PANIC ATTACK: ICD-10-CM

## 2023-04-26 DIAGNOSIS — E86.0 DEHYDRATION: Primary | ICD-10-CM

## 2023-04-26 LAB
ALBUMIN SERPL BCP-MCNC: 3.7 G/DL (ref 3.5–5.2)
ALP SERPL-CCNC: 65 U/L (ref 55–135)
ALT SERPL W/O P-5'-P-CCNC: 25 U/L (ref 10–44)
ANION GAP SERPL CALC-SCNC: 10 MMOL/L (ref 8–16)
AST SERPL-CCNC: 15 U/L (ref 10–40)
B-HCG UR QL: NEGATIVE
BASOPHILS # BLD AUTO: 0.05 K/UL (ref 0–0.2)
BASOPHILS NFR BLD: 0.5 % (ref 0–1.9)
BILIRUB SERPL-MCNC: 0.3 MG/DL (ref 0.1–1)
BILIRUB UR QL STRIP: NEGATIVE
BUN SERPL-MCNC: 8 MG/DL (ref 6–20)
CALCIUM SERPL-MCNC: 8.7 MG/DL (ref 8.7–10.5)
CHLORIDE SERPL-SCNC: 108 MMOL/L (ref 95–110)
CLARITY UR: CLEAR
CO2 SERPL-SCNC: 22 MMOL/L (ref 23–29)
COLOR UR: YELLOW
CREAT SERPL-MCNC: 0.8 MG/DL (ref 0.5–1.4)
CTP QC/QA: YES
D DIMER PPP IA.FEU-MCNC: 0.19 MG/L FEU
DIFFERENTIAL METHOD: NORMAL
EOSINOPHIL # BLD AUTO: 0.1 K/UL (ref 0–0.5)
EOSINOPHIL NFR BLD: 1.1 % (ref 0–8)
ERYTHROCYTE [DISTWIDTH] IN BLOOD BY AUTOMATED COUNT: 13 % (ref 11.5–14.5)
EST. GFR  (NO RACE VARIABLE): >60 ML/MIN/1.73 M^2
GLUCOSE SERPL-MCNC: 82 MG/DL (ref 70–110)
GLUCOSE UR QL STRIP: NEGATIVE
HCT VFR BLD AUTO: 38 % (ref 37–48.5)
HGB BLD-MCNC: 12.5 G/DL (ref 12–16)
HGB UR QL STRIP: NEGATIVE
IMM GRANULOCYTES # BLD AUTO: 0.03 K/UL (ref 0–0.04)
IMM GRANULOCYTES NFR BLD AUTO: 0.3 % (ref 0–0.5)
KETONES UR QL STRIP: NEGATIVE
LEUKOCYTE ESTERASE UR QL STRIP: NEGATIVE
LYMPHOCYTES # BLD AUTO: 3 K/UL (ref 1–4.8)
LYMPHOCYTES NFR BLD: 31.6 % (ref 18–48)
MCH RBC QN AUTO: 29.1 PG (ref 27–31)
MCHC RBC AUTO-ENTMCNC: 32.9 G/DL (ref 32–36)
MCV RBC AUTO: 88 FL (ref 82–98)
MONOCYTES # BLD AUTO: 0.9 K/UL (ref 0.3–1)
MONOCYTES NFR BLD: 9.2 % (ref 4–15)
NEUTROPHILS # BLD AUTO: 5.4 K/UL (ref 1.8–7.7)
NEUTROPHILS NFR BLD: 57.3 % (ref 38–73)
NITRITE UR QL STRIP: NEGATIVE
NRBC BLD-RTO: 0 /100 WBC
PH UR STRIP: 6 [PH] (ref 5–8)
PLATELET # BLD AUTO: 288 K/UL (ref 150–450)
PMV BLD AUTO: 9.8 FL (ref 9.2–12.9)
POTASSIUM SERPL-SCNC: 3.8 MMOL/L (ref 3.5–5.1)
PROT SERPL-MCNC: 6.6 G/DL (ref 6–8.4)
PROT UR QL STRIP: NEGATIVE
RBC # BLD AUTO: 4.3 M/UL (ref 4–5.4)
SODIUM SERPL-SCNC: 140 MMOL/L (ref 136–145)
SP GR UR STRIP: 1.01 (ref 1–1.03)
TROPONIN I SERPL DL<=0.01 NG/ML-MCNC: <0.006 NG/ML (ref 0–0.03)
TSH SERPL DL<=0.005 MIU/L-ACNC: 1.22 UIU/ML (ref 0.4–4)
URN SPEC COLLECT METH UR: NORMAL
UROBILINOGEN UR STRIP-ACNC: NEGATIVE EU/DL
WBC # BLD AUTO: 9.34 K/UL (ref 3.9–12.7)

## 2023-04-26 PROCEDURE — 93005 ELECTROCARDIOGRAM TRACING: CPT

## 2023-04-26 PROCEDURE — 80053 COMPREHEN METABOLIC PANEL: CPT | Performed by: EMERGENCY MEDICINE

## 2023-04-26 PROCEDURE — 81003 URINALYSIS AUTO W/O SCOPE: CPT | Performed by: EMERGENCY MEDICINE

## 2023-04-26 PROCEDURE — 84484 ASSAY OF TROPONIN QUANT: CPT | Performed by: EMERGENCY MEDICINE

## 2023-04-26 PROCEDURE — 96361 HYDRATE IV INFUSION ADD-ON: CPT

## 2023-04-26 PROCEDURE — 25000003 PHARM REV CODE 250: Performed by: EMERGENCY MEDICINE

## 2023-04-26 PROCEDURE — 93010 EKG 12-LEAD: ICD-10-PCS | Mod: ,,, | Performed by: INTERNAL MEDICINE

## 2023-04-26 PROCEDURE — 96374 THER/PROPH/DIAG INJ IV PUSH: CPT

## 2023-04-26 PROCEDURE — 99284 EMERGENCY DEPT VISIT MOD MDM: CPT | Mod: 25

## 2023-04-26 PROCEDURE — 93010 ELECTROCARDIOGRAM REPORT: CPT | Mod: ,,, | Performed by: INTERNAL MEDICINE

## 2023-04-26 PROCEDURE — 81025 URINE PREGNANCY TEST: CPT | Performed by: EMERGENCY MEDICINE

## 2023-04-26 PROCEDURE — 63600175 PHARM REV CODE 636 W HCPCS: Performed by: EMERGENCY MEDICINE

## 2023-04-26 PROCEDURE — 85025 COMPLETE CBC W/AUTO DIFF WBC: CPT | Performed by: EMERGENCY MEDICINE

## 2023-04-26 PROCEDURE — 85379 FIBRIN DEGRADATION QUANT: CPT | Performed by: EMERGENCY MEDICINE

## 2023-04-26 PROCEDURE — 84443 ASSAY THYROID STIM HORMONE: CPT | Performed by: EMERGENCY MEDICINE

## 2023-04-26 RX ORDER — LORAZEPAM 2 MG/ML
1 INJECTION INTRAMUSCULAR
Status: COMPLETED | OUTPATIENT
Start: 2023-04-26 | End: 2023-04-26

## 2023-04-26 RX ADMIN — SODIUM CHLORIDE 1000 ML: 9 INJECTION, SOLUTION INTRAVENOUS at 12:04

## 2023-04-26 RX ADMIN — SODIUM CHLORIDE 1000 ML: 9 INJECTION, SOLUTION INTRAVENOUS at 08:04

## 2023-04-26 RX ADMIN — LORAZEPAM 1 MG: 2 INJECTION INTRAMUSCULAR; INTRAVENOUS at 08:04

## 2023-04-26 NOTE — DISCHARGE INSTRUCTIONS
Follow-up as scheduled with your psychiatrist.  Take vistaril if needed to sleep but also for also for anxiety if needed.  Return for new or worsening symptoms or pain.

## 2023-04-26 NOTE — ED PROVIDER NOTES
SCRIBE #1 NOTE: I, Gala Prado, am scribing for, and in the presence of,  Naheed Guzman MD. I have scribed the entire note.       Source of History:  Patient, medical record    Chief complaint:  Dizziness (Dizziness, palpitations, nausea and SOB since 2 am this morning. Reports being seen in ED for same symptoms 1 week ago. )      HPI:  Thelma Roman is a 27 y.o. female presenting with palpitations starting within the past hour. Patient works nights five days per week and had left her shift this morning when her symptoms began. She describes feeling her heart rate become elevated, followed by lightheadedness, mild nausea, and difficulty catching her breath. Patient describes her symptoms as familiar to an episode for which she was seen here four days ago. Her workup was negative at the time and she was noted to be anxious. Patient takes Effexor and Wellbutrin without any recent changes and sees her psychiatrist in 1.5 weeks. She denies any appetite changes, congestion, runny nose, nausea, vomiting, or diarrhea. She is not taking any antihistamines and has had no history of thyroid abnormalities on previous labs. No FHx of cardiac or thyroid issues. Patient states she sleeps 6-8 hours per day, avoids salty foods, and does not use caffeine regularly. She does report having a high stress work life.    This is the extent to the patients complaints today here in the emergency department.    ROS: As per HPI and below:  General: No fever.  No chills.  Eyes: No visual changes.  ENT: No sore throat. No ear pain  Head: No headache.    Respiratory: Notes shortness of breath.  Cardiovascular: No chest pain. Notes palpitations.  Abdomen: No abdominal pain.  Notes nausea. No vomiting.  Genito-Urinary: No abnormal urination.  Neurologic: No focal weakness.  No numbness. Notes lightheadedness.  MSK: no back pain.  Integument: No rashes or lesions.  Hematologic: No easy bruising.  Endocrine: No excessive thirst or  "urination.    Review of patient's allergies indicates:  No Known Allergies    PMH:  As per HPI and below:  Past Medical History:   Diagnosis Date    Acute appendicitis 10/30/2020    Anxiety     Chronic pain of right knee 11/18/2018    Depression     Panic disorder without agoraphobia 9/16/2015     Past Surgical History:   Procedure Laterality Date    CARPAL TUNNEL RELEASE      FACIAL RECONSTRUCTION SURGERY      LAPAROSCOPIC APPENDECTOMY N/A 10/31/2020    Procedure: APPENDECTOMY, LAPAROSCOPIC;  Surgeon: Steve Saucedo Jr., MD;  Location: [facility] OR;  Service: General;  Laterality: N/A;    LAPAROSCOPIC SALPINGECTOMY Bilateral 11/9/2022    Procedure: SALPINGECTOMY, LAPAROSCOPIC, REMOVAL OF NEXPLANON ;  Surgeon: Shivani Rodgers MD;  Location: [facility] OR;  Service: OB/GYN;  Laterality: Bilateral;    TONSILLECTOMY         Social History     Tobacco Use    Smoking status: Never    Smokeless tobacco: Never   Substance Use Topics    Alcohol use: No    Drug use: No       Physical Exam:    /81   Pulse 105   Temp 98 °F (36.7 °C)   Resp 19   Ht 5' 9" (1.753 m)   Wt 97.5 kg (215 lb)   SpO2 100%   Breastfeeding No   BMI 31.75 kg/m²   Nursing note and vital signs reviewed.    Appearance: Tearful and anxious appearing.  Eyes: No conjunctival injection.  Neck: No deformity.   ENT: Oropharynx clear.  No stridor.   Chest/ Respiratory: Clear to auscultation bilaterally.  Good air movement.  No wheezes.  No rhonchi. No rales. No accessory muscle use.  Cardiovascular: Tachycardic at 120.  Regular rhythm.  No murmurs. No gallops. No rubs.  Abdomen: Soft.  Obese abdomen.  Nontender.  No guarding.  No rebound. Non-peritoneal.  Musculoskeletal: Good range of motion all joints.  No deformities.  Neck supple.  No meningismus.  Skin: No rashes seen.  Good turgor.  No abrasions.  No ecchymoses.  Neurologic: Motor intact.  Sensation intact.  Cerebellar intact.  Cranial nerves intact.  Mental Status:  Alert and oriented x 3.  Appropriate, " conversant.        I decided to obtain the patient's medical records.  Summary of Medical Records:  Recent ED visit reviewed from 4/22.  Patient tachycardic on presentation at that visit as well, improved with fluids and anxiolytics.  Basic labs normal.  EKG with sinus tachycardia.      EKG:  I independently reviewed and interpreted the EKG and my findings are as follows:   Sinus tachycardia. Rate of 119. Non-specific ST changes similar to prior on April 22 of this year. No STEMI.      Labs:  Results for orders placed or performed during the hospital encounter of 04/26/23   TSH   Result Value Ref Range    TSH 1.216 0.400 - 4.000 uIU/mL   Troponin I   Result Value Ref Range    Troponin I <0.006 0.000 - 0.026 ng/mL   Urinalysis, Reflex to Urine Culture Urine, Clean Catch    Specimen: Urine   Result Value Ref Range    Specimen UA Urine, Clean Catch     Color, UA Yellow Yellow, Straw, Itzel    Appearance, UA Clear Clear    pH, UA 6.0 5.0 - 8.0    Specific Gravity, UA 1.015 1.005 - 1.030    Protein, UA Negative Negative    Glucose, UA Negative Negative    Ketones, UA Negative Negative    Bilirubin (UA) Negative Negative    Occult Blood UA Negative Negative    Nitrite, UA Negative Negative    Urobilinogen, UA Negative <2.0 EU/dL    Leukocytes, UA Negative Negative   CBC auto differential   Result Value Ref Range    WBC 9.34 3.90 - 12.70 K/uL    RBC 4.30 4.00 - 5.40 M/uL    Hemoglobin 12.5 12.0 - 16.0 g/dL    Hematocrit 38.0 37.0 - 48.5 %    MCV 88 82 - 98 fL    MCH 29.1 27.0 - 31.0 pg    MCHC 32.9 32.0 - 36.0 g/dL    RDW 13.0 11.5 - 14.5 %    Platelets 288 150 - 450 K/uL    MPV 9.8 9.2 - 12.9 fL    Immature Granulocytes 0.3 0.0 - 0.5 %    Gran # (ANC) 5.4 1.8 - 7.7 K/uL    Immature Grans (Abs) 0.03 0.00 - 0.04 K/uL    Lymph # 3.0 1.0 - 4.8 K/uL    Mono # 0.9 0.3 - 1.0 K/uL    Eos # 0.1 0.0 - 0.5 K/uL    Baso # 0.05 0.00 - 0.20 K/uL    nRBC 0 0 /100 WBC    Gran % 57.3 38.0 - 73.0 %    Lymph % 31.6 18.0 - 48.0 %    Mono %  9.2 4.0 - 15.0 %    Eosinophil % 1.1 0.0 - 8.0 %    Basophil % 0.5 0.0 - 1.9 %    Differential Method Automated    Comprehensive metabolic panel   Result Value Ref Range    Sodium 140 136 - 145 mmol/L    Potassium 3.8 3.5 - 5.1 mmol/L    Chloride 108 95 - 110 mmol/L    CO2 22 (L) 23 - 29 mmol/L    Glucose 82 70 - 110 mg/dL    BUN 8 6 - 20 mg/dL    Creatinine 0.8 0.5 - 1.4 mg/dL    Calcium 8.7 8.7 - 10.5 mg/dL    Total Protein 6.6 6.0 - 8.4 g/dL    Albumin 3.7 3.5 - 5.2 g/dL    Total Bilirubin 0.3 0.1 - 1.0 mg/dL    Alkaline Phosphatase 65 55 - 135 U/L    AST 15 10 - 40 U/L    ALT 25 10 - 44 U/L    Anion Gap 10 8 - 16 mmol/L    eGFR >60 >60 mL/min/1.73 m^2   D dimer, quantitative   Result Value Ref Range    D-Dimer 0.19 <0.50 mg/L FEU   POCT urine pregnancy   Result Value Ref Range    POC Preg Test, Ur Negative Negative     Acceptable Yes          Initial Impression  27 y.o. female with palpitations and lightheadedness, similar to prior presentation four days ago on 4/22. Appears panicked. Has upcoming appointment with her psychiatrist on 5/8. Given persistent tachycardia from recent visit, will check thyroid and troponin and treat with IV fluids and Ativan, as this has worked well previously.     Differential Dx:  SVT, atrial fibrillation, atrial flutter, ventricular arrhythmia, heart block, MI, orthostatic hypotension, sinus tachycardia, sensitive carotid sinus, stimulant abuse or side effect, electrolyte abnormalities, hyperthyroidism, anxiety.    MDM:        ED Course as of 04/28/23 0924   Wed Apr 26, 2023   1331 Heart improved with 2 L of fluids.  Will discharge patient.  Labs reassuring and no elevation of troponin or D-dimer.  [LF]      ED Course User Index  [LF] Naheed Guzman MD       Patient felt palpitations prior to d/c and vitals rechecked x 2 with significant improvement of tachycardia.  Ambulatory referral to cardiology placed and return precautions discussed with patient.  Advised rest,  vistaril as needed to sleep and for anxiety, and f/u with psychiatrist as planned.  Avoid caffeine, stimulants.  No evidence of dangerous arrhythmia or other life-threatening causes of symptoms.             Scribe Attestation:   Scribe #1: I performed the above scribed service and the documentation accurately describes the services I performed. I attest to the accuracy of the note.  Physician Attestation for Scribe: I, Naheed Guzman MD, reviewed documentation as scribed in my presence, which is both accurate and complete.      Diagnostic Impression:    1. Dehydration    2. Palpitations    3. Dizziness    4. Panic attack         ED Disposition Condition    Discharge Stable            ED Prescriptions    None       Follow-up Information    None          Naheed Guzman MD  04/28/23 3630

## 2023-04-26 NOTE — ED TRIAGE NOTES
Pt c/o weakness and palpitations. She reports experiencing similar symptoms less than a week ago which prompted an ED visit. Denies chest pain, reports SOB. She admits to being under stress but no more than usual. Denies n/v/d.

## 2023-04-30 ENCOUNTER — PATIENT MESSAGE (OUTPATIENT)
Dept: PSYCHIATRY | Facility: CLINIC | Age: 28
End: 2023-04-30
Payer: MEDICAID

## 2023-05-05 DIAGNOSIS — F32.1 CURRENT MODERATE EPISODE OF MAJOR DEPRESSIVE DISORDER, UNSPECIFIED WHETHER RECURRENT: ICD-10-CM

## 2023-05-05 DIAGNOSIS — F43.10 PTSD (POST-TRAUMATIC STRESS DISORDER): ICD-10-CM

## 2023-05-05 RX ORDER — BUPROPION HYDROCHLORIDE 300 MG/1
300 TABLET ORAL DAILY
Qty: 30 TABLET | Refills: 3 | Status: SHIPPED | OUTPATIENT
Start: 2023-05-05 | End: 2023-05-08 | Stop reason: SDUPTHER

## 2023-05-05 RX ORDER — BUPROPION HYDROCHLORIDE 150 MG/1
150 TABLET ORAL DAILY
Qty: 30 TABLET | Refills: 3 | Status: SHIPPED | OUTPATIENT
Start: 2023-05-05 | End: 2023-05-08

## 2023-05-08 ENCOUNTER — OFFICE VISIT (OUTPATIENT)
Dept: PSYCHIATRY | Facility: CLINIC | Age: 28
End: 2023-05-08
Payer: MEDICAID

## 2023-05-08 DIAGNOSIS — F33.1 MODERATE EPISODE OF RECURRENT MAJOR DEPRESSIVE DISORDER: ICD-10-CM

## 2023-05-08 DIAGNOSIS — F43.10 PTSD (POST-TRAUMATIC STRESS DISORDER): ICD-10-CM

## 2023-05-08 DIAGNOSIS — F32.1 CURRENT MODERATE EPISODE OF MAJOR DEPRESSIVE DISORDER, UNSPECIFIED WHETHER RECURRENT: ICD-10-CM

## 2023-05-08 DIAGNOSIS — F41.1 GENERALIZED ANXIETY DISORDER WITH PANIC ATTACKS: Primary | ICD-10-CM

## 2023-05-08 DIAGNOSIS — F41.0 GENERALIZED ANXIETY DISORDER WITH PANIC ATTACKS: Primary | ICD-10-CM

## 2023-05-08 PROBLEM — E66.09 CLASS 1 OBESITY DUE TO EXCESS CALORIES WITHOUT SERIOUS COMORBIDITY WITH BODY MASS INDEX (BMI) OF 33.0 TO 33.9 IN ADULT: Status: RESOLVED | Noted: 2021-02-04 | Resolved: 2023-05-08

## 2023-05-08 PROBLEM — E66.811 CLASS 1 OBESITY DUE TO EXCESS CALORIES WITHOUT SERIOUS COMORBIDITY WITH BODY MASS INDEX (BMI) OF 33.0 TO 33.9 IN ADULT: Status: RESOLVED | Noted: 2021-02-04 | Resolved: 2023-05-08

## 2023-05-08 RX ORDER — BUPROPION HYDROCHLORIDE 300 MG/1
300 TABLET ORAL DAILY
Qty: 30 TABLET | Refills: 3 | Status: SHIPPED | OUTPATIENT
Start: 2023-05-08 | End: 2023-05-12

## 2023-05-08 RX ORDER — VENLAFAXINE HYDROCHLORIDE 75 MG/1
75 CAPSULE, EXTENDED RELEASE ORAL DAILY
Qty: 30 CAPSULE | Refills: 3 | Status: SHIPPED | OUTPATIENT
Start: 2023-05-08 | End: 2023-05-12

## 2023-05-08 RX ORDER — VENLAFAXINE HYDROCHLORIDE 150 MG/1
150 CAPSULE, EXTENDED RELEASE ORAL DAILY
Qty: 30 CAPSULE | Refills: 3 | Status: SHIPPED | OUTPATIENT
Start: 2023-05-08 | End: 2023-05-12

## 2023-05-08 RX ORDER — PROPRANOLOL HYDROCHLORIDE 20 MG/1
20 TABLET ORAL DAILY PRN
Qty: 30 TABLET | Refills: 0 | Status: SHIPPED | OUTPATIENT
Start: 2023-05-08 | End: 2024-05-07

## 2023-05-08 NOTE — PROGRESS NOTES
5/8/2023 3:22 PM   Thelma Roman   1995   [id number]           OUTPATIENT PSYCHIATRY FOLLOW- UP VISIT    Reason for Encounter:  Thelma Roman, a 27 y.o. female,who presents today for follow up of depression, anxiety, PTSD.  Met with patient.    TELE PSYCHIATRY Disclaimer   *The patient was informed despite using HIPPA compliant technology there may be risks including security breach, technological failure, inability to perform a comprehensive physical exam which could delay or prevent an accurate diagnosis, and potential complications from treatment decisions rendered over a telemedical platform. The patient understands and consented to the use of tele-health service as being a safe measure to mitigate during COVID 19 Pandemic .  The patient was also informed of the relationship between the physician and patient and the respective role of any other health care provider with respect to management of the patient; and notified that the pt may decline to receive medical services by telemedicine and may withdraw from such care at any time.     Patient's Current location:  [address] [place] [address] [address] [place] [address]  In Case of Emergency pts next of kin   Figueroa Rivers (Father)    [phone] (Home Phone)  Visit type: Virtual visit with synchronous audio and video  Total time spent with patient: 30 mins       Interval History and Content of Current Session:  Reports having multiple panic attacks which began in the last 2 weeks, including 3 serious ones which required medical intervention (ED visits, ambulance called). Prior to this, last panic attack was almost one year ago. During episodes heart rate was near 200, diaphoresis, palpitations, SOB, agoraphobia. Relieved with valium in ED, controlling her breathing.    Has had prior panic attacks but usually is able to control with coping skills.  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 Prior to last panic attack, customer "called me a bitch," resulting in panic episode. Customer was later removed by police. Pt went to back room, experienced SOB, attempted to utilize grounding exercises, states that they didn't help in the moment.   Had already put in 2 weeks. 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  Mood has been "more depressed but that might be related to the panic attacks." Sleeping well. Appetite unchanged. Energy intact/appropriate. No SI  Medical workup in ED WNL.   Compliant with medications, no acute side effects. Rarely uses Vistaril.  Discussed medication changes below. Discussed options for panic attacks. Would like to try propranolol.             Psychiatric Review Of Systems - Is patient experiencing or having changes in:      sleep: No   weight: Not asked  energy/anergy: No  interest/pleasure/anhedonia: Yes  somatic symptoms: no  guilty/hopelessness: no  concentration: no   S.I.B.s/risky behavior: no  SI/SA:  no    anxiety/panic: Yes  Agoraphobia:  Yes  Social phobia:  no  Recurrent nightmares:  Yes   hyper startle response:  yes  Avoidance: yes  Recurrent thoughts:  no  Recurrent behaviors:  no    Irritability: No  Racing thoughts: no  Impulsive behaviors: no  Pressured speech:  no    Paranoia:no  Delusions: no  AVH:no        Risk Parameters:  Patient reports no suicidal ideation  Patient reports no homicidal ideation  Patient reports no self-injurious behavior  Patient reports no violent behavior    Psychotropic medication review      Current meds:  -Effexor 150mg daily  -Wellbutrin XL 450mg  -Vistaril 25mg PRN    Compliance: yes    Side effects: None      Substance use  Tobacco- denies   ETOH- denies  Illicit substances- denies     Review of Systems     Past Medical, Family and Social " History: The patient's past medical, family and social history have been reviewed and updated as appropriate within the electronic medical record - see encounter notes.    Medical Review of Symptoms  History obtained from the patient  General : NO chills or fever  Respiratory: NO cough, shortness of breath  Cardiovascular: NO chest pain, palpitations or racing heart  Gastrointestinal: NO nausea, vomiting, constipation or diarrhea  Neurological: NO confusion, dizziness, headaches or tremors  Psychiatric: please see HPI     Objective     ALL MEDICATIONS:    Current Outpatient Medications:     buPROPion (WELLBUTRIN XL) 150 MG TB24 tablet, Take 1 tablet (150 mg total) by mouth once daily. Take with 300mg tablet for a total of 450mg, Disp: 30 tablet, Rfl: 3    buPROPion (WELLBUTRIN XL) 300 MG 24 hr tablet, Take 1 tablet (300 mg total) by mouth once daily. Take with 150mg tablet for a total of 450mg, Disp: 30 tablet, Rfl: 3    hydrOXYzine pamoate (VISTARIL) 25 MG Cap, Take 2 capsules (50 mg total) by mouth nightly as needed (insomnia)., Disp: 60 capsule, Rfl: 3    ibuprofen (ADVIL,MOTRIN) 600 MG tablet, Take 1 tablet (600 mg total) by mouth every 6 (six) hours as needed for Pain., Disp: 20 tablet, Rfl: 0    venlafaxine (EFFEXOR-XR) 150 MG Cp24, Take 1 capsule (150 mg total) by mouth once daily., Disp: 30 capsule, Rfl: 3    ALLERGIES:  Review of patient's allergies indicates:  No Known Allergies    RELEVANT LABS/STUDIES:    Lab Results   Component Value Date    WBC 9.34 04/26/2023    HGB 12.5 04/26/2023    HCT 38.0 04/26/2023    MCV 88 04/26/2023     04/26/2023     BMP  Lab Results   Component Value Date     04/26/2023    K 3.8 04/26/2023     04/26/2023    CO2 22 (L) 04/26/2023    BUN 8 04/26/2023    CREATININE 0.8 04/26/2023    CALCIUM 8.7 04/26/2023    ANIONGAP 10 04/26/2023    ESTGFRAFRICA >60 05/18/2022    EGFRNONAA >60 05/18/2022     Lab Results   Component Value Date    ALT 25 04/26/2023    AST 15  "04/26/2023    ALKPHOS 65 04/26/2023    BILITOT 0.3 04/26/2023     Lab Results   Component Value Date    TSH 1.216 04/26/2023     Lab Results   Component Value Date    HGBA1C 5.4 05/04/2021       Constitutional  Vitals:    There were no vitals filed for this visit.         PHYSICAL EXAM  General: well developed, well nourished  Neurologic:   Gait: Normal   Psychomotor signs:  No involuntary movements or tremor  AIMS: none    PSYCHIATRIC EXAM:     Mental Status Exam:  Appearance: unremarkable, age appropriate  Behavior/Cooperation: normal, cooperative  Speech: normal tone, normal rate, normal pitch, normal volume  Language: uses words appropriately; NO aphasia or dysarthria  Mood: "worse but propbably because of the panic attacks"  Affect:  Appropriate, mildly constricted   Thought Process: normal and logical  Thought Content: normal, no suicidality, no homicidality, delusions, or paranoia  Level of Consciousness: Alert and Oriented x3  Memory:  Intact, aware of recent events, aware of childhood events  Attention/concentration: appropriate for age/education.   Fund of Knowledge: appears adequate  Insight:  Good   Judgment: good     Assessment and Diagnosis   Status/Progress:  Thelma Roman is a 26 y.o. female with a past psychiatric history of unspecified depression, PTSD, anxiety with panic attacks who presented to clinic for a follow up. Pt meets criteria for PTSD with prominent sx of nightmare, hyperarousal, flashback, hypervigilance. Pt also with sx of anxiety with panic attack and depression. Likely hx of TBI. Reports worsening anxiety today with recent visits to ED for panic attacks with physical symptoms (palpitations, tachycardia, SOB, etc).   Based on the examination today, the patient's problem(s) is/are adequately but not ideally controlled.  New problems have not been presented today.   Co-morbidities are not complicating management of the primary condition. Patient seeing some benefits from initiation of " venlafaxine but continues to have significant issues with anxiety, muscle tension, etc. Patient with good insight and progressing professionally at this time. Favorable prognosis should she continue on current track.         General Impression:     No diagnosis found.          Intervention/Counseling/Treatment Plan   Medication Management:   Decrease bupropion XL to 300mg daily due to worsening panic attacks/anxiety-refilled today  Increase venlafaxine XR to 225mg  daily-refilled today to better address anxiety/panic attacks  Discontinue Vistaril---reports rarely using  Start Propranolol 20mg daily PRN for panic attacks  Referred to [facility] for therapy     Labs: reviewed most recent  The treatment plan and follow up plan were reviewed with the patient.  Discussed with patient informed consent, risks vs. benefits, alternative treatments, side effect profile and the inherent unpredictability of individual responses to these treatments. The patient expresses understanding of the above and displays the capacity to agree with this current plan and had no other questions.  Encouraged Patient to keep future appointments.   Take medications as prescribed and abstain from substance abuse.   In the event of an emergency patient was advised to go to the emergency room.    Return to Clinic: 1 month or sooner PRN    > than 50% of total time spend on coordination of care and counseling   (which included pts differential diagnosis and prognosis for psychiatric conditions, risks, benefits of treatments, instructions and adherence to treatment plan, risk reduction, reviewing current psychiatric medication regimen, medical problems and social stressors. In addtion to possible discussion with other healthcare provider/s)    Add on Psychotherapy time:0  Total Face time: 30 minutes     Trent Wiedemann, MD  [facility] PGY-3  [facility]

## 2023-05-11 ENCOUNTER — PATIENT MESSAGE (OUTPATIENT)
Dept: PSYCHIATRY | Facility: CLINIC | Age: 28
End: 2023-05-11
Payer: MEDICAID

## 2023-05-12 RX ORDER — BUPROPION HYDROCHLORIDE 300 MG/1
300 TABLET ORAL DAILY
Qty: 90 TABLET | Refills: 1 | Status: SHIPPED | OUTPATIENT
Start: 2023-05-12 | End: 2023-11-08

## 2023-05-12 RX ORDER — VENLAFAXINE HYDROCHLORIDE 150 MG/1
150 CAPSULE, EXTENDED RELEASE ORAL DAILY
Qty: 90 CAPSULE | Refills: 1 | Status: SHIPPED | OUTPATIENT
Start: 2023-05-12 | End: 2023-11-08

## 2023-05-12 RX ORDER — VENLAFAXINE HYDROCHLORIDE 75 MG/1
75 CAPSULE, EXTENDED RELEASE ORAL DAILY
Qty: 90 CAPSULE | Refills: 1 | Status: SHIPPED | OUTPATIENT
Start: 2023-05-12 | End: 2023-11-08

## 2023-06-11 ENCOUNTER — PATIENT MESSAGE (OUTPATIENT)
Dept: PSYCHIATRY | Facility: CLINIC | Age: 28
End: 2023-06-11
Payer: MEDICAID

## 2023-11-10 ENCOUNTER — HOSPITAL ENCOUNTER (EMERGENCY)
Facility: HOSPITAL | Age: 28
Discharge: HOME | End: 2023-11-11
Attending: EMERGENCY MEDICINE

## 2023-11-10 DIAGNOSIS — R00.0 TACHYCARDIA: ICD-10-CM

## 2023-11-10 DIAGNOSIS — M25.519 SHOULDER PAIN: ICD-10-CM

## 2023-11-10 DIAGNOSIS — M79.601 RIGHT ARM PAIN: ICD-10-CM

## 2023-11-10 LAB
B-HCG UR QL: NEGATIVE
CTP QC/QA: YES

## 2023-11-10 PROCEDURE — 86140 C-REACTIVE PROTEIN: CPT | Performed by: EMERGENCY MEDICINE

## 2023-11-10 PROCEDURE — 84484 ASSAY OF TROPONIN QUANT: CPT | Performed by: EMERGENCY MEDICINE

## 2023-11-10 PROCEDURE — 87389 HIV-1 AG W/HIV-1&-2 AB AG IA: CPT | Performed by: PHYSICIAN ASSISTANT

## 2023-11-10 PROCEDURE — 80053 COMPREHEN METABOLIC PANEL: CPT | Performed by: EMERGENCY MEDICINE

## 2023-11-10 PROCEDURE — 93010 ELECTROCARDIOGRAM REPORT: CPT | Mod: ,,, | Performed by: INTERNAL MEDICINE

## 2023-11-10 PROCEDURE — 93005 ELECTROCARDIOGRAM TRACING: CPT

## 2023-11-10 PROCEDURE — 93010 EKG 12-LEAD: ICD-10-PCS | Mod: ,,, | Performed by: INTERNAL MEDICINE

## 2023-11-10 PROCEDURE — 83690 ASSAY OF LIPASE: CPT | Performed by: EMERGENCY MEDICINE

## 2023-11-10 PROCEDURE — 99285 EMERGENCY DEPT VISIT HI MDM: CPT | Mod: 25

## 2023-11-10 PROCEDURE — 85025 COMPLETE CBC W/AUTO DIFF WBC: CPT | Performed by: EMERGENCY MEDICINE

## 2023-11-10 PROCEDURE — 86803 HEPATITIS C AB TEST: CPT | Performed by: PHYSICIAN ASSISTANT

## 2023-11-10 PROCEDURE — 81025 URINE PREGNANCY TEST: CPT | Performed by: EMERGENCY MEDICINE

## 2023-11-11 VITALS
SYSTOLIC BLOOD PRESSURE: 142 MMHG | DIASTOLIC BLOOD PRESSURE: 88 MMHG | TEMPERATURE: 98 F | OXYGEN SATURATION: 95 % | HEART RATE: 80 BPM | RESPIRATION RATE: 16 BRPM

## 2023-11-11 LAB
ALBUMIN SERPL BCP-MCNC: 4.5 G/DL (ref 3.5–5.2)
ALP SERPL-CCNC: 113 U/L (ref 55–135)
ALT SERPL W/O P-5'-P-CCNC: 16 U/L (ref 10–44)
ANION GAP SERPL CALC-SCNC: 13 MMOL/L (ref 8–16)
AST SERPL-CCNC: 16 U/L (ref 10–40)
BASOPHILS # BLD AUTO: 0.07 K/UL (ref 0–0.2)
BASOPHILS NFR BLD: 0.5 % (ref 0–1.9)
BILIRUB SERPL-MCNC: 0.4 MG/DL (ref 0.1–1)
BILIRUB UR QL STRIP: NEGATIVE
BUN SERPL-MCNC: 18 MG/DL (ref 6–20)
CALCIUM SERPL-MCNC: 10.7 MG/DL (ref 8.7–10.5)
CHLORIDE SERPL-SCNC: 103 MMOL/L (ref 95–110)
CLARITY UR REFRACT.AUTO: CLEAR
CO2 SERPL-SCNC: 24 MMOL/L (ref 23–29)
COLOR UR AUTO: YELLOW
CREAT SERPL-MCNC: 0.8 MG/DL (ref 0.5–1.4)
CRP SERPL-MCNC: 2.9 MG/L (ref 0–8.2)
DIFFERENTIAL METHOD: ABNORMAL
EOSINOPHIL # BLD AUTO: 0.1 K/UL (ref 0–0.5)
EOSINOPHIL NFR BLD: 0.9 % (ref 0–8)
ERYTHROCYTE [DISTWIDTH] IN BLOOD BY AUTOMATED COUNT: 13.2 % (ref 11.5–14.5)
EST. GFR  (NO RACE VARIABLE): >60 ML/MIN/1.73 M^2
GLUCOSE SERPL-MCNC: 88 MG/DL (ref 70–110)
GLUCOSE UR QL STRIP: NEGATIVE
HCT VFR BLD AUTO: 47 % (ref 37–48.5)
HCV AB SERPL QL IA: NORMAL
HGB BLD-MCNC: 15.3 G/DL (ref 12–16)
HGB UR QL STRIP: NEGATIVE
HIV 1+2 AB+HIV1 P24 AG SERPL QL IA: NORMAL
IMM GRANULOCYTES # BLD AUTO: 0.04 K/UL (ref 0–0.04)
IMM GRANULOCYTES NFR BLD AUTO: 0.3 % (ref 0–0.5)
KETONES UR QL STRIP: NEGATIVE
LEUKOCYTE ESTERASE UR QL STRIP: NEGATIVE
LIPASE SERPL-CCNC: 24 U/L (ref 4–60)
LYMPHOCYTES # BLD AUTO: 3.1 K/UL (ref 1–4.8)
LYMPHOCYTES NFR BLD: 23.5 % (ref 18–48)
MCH RBC QN AUTO: 29 PG (ref 27–31)
MCHC RBC AUTO-ENTMCNC: 32.6 G/DL (ref 32–36)
MCV RBC AUTO: 89 FL (ref 82–98)
MONOCYTES # BLD AUTO: 1 K/UL (ref 0.3–1)
MONOCYTES NFR BLD: 7.8 % (ref 4–15)
NEUTROPHILS # BLD AUTO: 8.7 K/UL (ref 1.8–7.7)
NEUTROPHILS NFR BLD: 67 % (ref 38–73)
NITRITE UR QL STRIP: NEGATIVE
NRBC BLD-RTO: 0 /100 WBC
PH UR STRIP: 6 [PH] (ref 5–8)
PLATELET # BLD AUTO: 394 K/UL (ref 150–450)
PMV BLD AUTO: 10.2 FL (ref 9.2–12.9)
POTASSIUM SERPL-SCNC: 3.6 MMOL/L (ref 3.5–5.1)
PROT SERPL-MCNC: 8.5 G/DL (ref 6–8.4)
PROT UR QL STRIP: ABNORMAL
RBC # BLD AUTO: 5.28 M/UL (ref 4–5.4)
SODIUM SERPL-SCNC: 140 MMOL/L (ref 136–145)
SP GR UR STRIP: >1.03 (ref 1–1.03)
TROPONIN I SERPL DL<=0.01 NG/ML-MCNC: <0.006 NG/ML (ref 0–0.03)
URN SPEC COLLECT METH UR: ABNORMAL
WBC # BLD AUTO: 13.01 K/UL (ref 3.9–12.7)

## 2023-11-11 PROCEDURE — 81003 URINALYSIS AUTO W/O SCOPE: CPT | Performed by: EMERGENCY MEDICINE

## 2023-11-11 PROCEDURE — 63600175 PHARM REV CODE 636 W HCPCS: Performed by: EMERGENCY MEDICINE

## 2023-11-11 PROCEDURE — 25500020 PHARM REV CODE 255: Performed by: EMERGENCY MEDICINE

## 2023-11-11 PROCEDURE — 25000003 PHARM REV CODE 250: Performed by: EMERGENCY MEDICINE

## 2023-11-11 PROCEDURE — 96361 HYDRATE IV INFUSION ADD-ON: CPT

## 2023-11-11 PROCEDURE — 96374 THER/PROPH/DIAG INJ IV PUSH: CPT

## 2023-11-11 RX ORDER — KETOROLAC TROMETHAMINE 30 MG/ML
10 INJECTION, SOLUTION INTRAMUSCULAR; INTRAVENOUS
Status: COMPLETED | OUTPATIENT
Start: 2023-11-11 | End: 2023-11-11

## 2023-11-11 RX ORDER — NAPROXEN 500 MG/1
500 TABLET ORAL 2 TIMES DAILY WITH MEALS
Qty: 14 TABLET | Refills: 0 | Status: SHIPPED | OUTPATIENT
Start: 2023-11-11 | End: 2023-11-18

## 2023-11-11 RX ADMIN — IOHEXOL 100 ML: 350 INJECTION, SOLUTION INTRAVENOUS at 02:11

## 2023-11-11 RX ADMIN — KETOROLAC TROMETHAMINE 10 MG: 30 INJECTION, SOLUTION INTRAMUSCULAR; INTRAVENOUS at 01:11

## 2023-11-11 RX ADMIN — SODIUM CHLORIDE 1000 ML: 9 INJECTION, SOLUTION INTRAVENOUS at 01:11

## 2023-11-11 NOTE — ED TRIAGE NOTES
Thelma Roman, an 28 y.o. female presents to the ED with complaints of R shoulder pain since last night. + sharp pain that shoot down to R hand. + for anxiety. Hx of carpal tunnel Sx in R hand.      Chief Complaint   Patient presents with    Shoulder Pain     Pt c/o 9/10 sharp shoulder pain x 1 day. Denies any recent trauma to affected area     Review of patient's allergies indicates:  No Known Allergies  Past Medical History:   Diagnosis Date    Acute appendicitis 10/30/2020    Anxiety     Chronic pain of right knee 11/18/2018    Depression     Panic disorder without agoraphobia 9/16/2015

## 2023-11-11 NOTE — DISCHARGE INSTRUCTIONS
You were seen in the emergency department today for shoulder pain and abdominal pain.  Your workup in the emergency department was unremarkable including no signs of kidney injury, urine infection, gallbladder disease, bowel inflammation, or fracture.  You should continue to take Tylenol as directed on the bottle for the next week.  You have also been prescribed naproxen, do not take Motrin want her taking this medication.  Please schedule a follow up appointment with the primary care doctor within 1 week for re-evaluation    Please return to the emergency department if you experience any new or concerning symptoms including worsening pain, chest pain, shortness of breath, lightheadedness, dizziness, loss of consciousness, inability to tolerate food/liquids, fevers, or chills.

## 2023-11-11 NOTE — ED PROVIDER NOTES
Encounter Date: 11/10/2023       History     Chief Complaint   Patient presents with    Shoulder Pain     Pt c/o 9/10 sharp shoulder pain x 1 day. Denies any recent trauma to affected area     HPI  Thelma is a 28 y.o. F with PMH of appendectomy, anxiety, depression who presents with right-sided shoulder pain that radiates down the arm over the past day.  She denies any trauma or any obvious inciting occurrence.  States it is a bit worse with moving her arm but no specific movement that seems to make it worse.  She has a slight amount of right-sided mid abdominal pain as well.  She denies dysuria.  Denies fever.  Denies any trouble breathing.  Pain is not worse with deep breathing.  She denies chest pain.  She took Excedrin without much improvement.  At 1 point she had some nausea due to how much pain she was in.  Pain is better now than it was at that time, the intensity seems to be fluctuating somewhat.  She denies vomiting.  Review of patient's allergies indicates:  No Known Allergies  Past Medical History:   Diagnosis Date    Acute appendicitis 10/30/2020    Anxiety     Chronic pain of right knee 11/18/2018    Depression     Panic disorder without agoraphobia 9/16/2015     Past Surgical History:   Procedure Laterality Date    CARPAL TUNNEL RELEASE      FACIAL RECONSTRUCTION SURGERY      LAPAROSCOPIC APPENDECTOMY N/A 10/31/2020    Procedure: APPENDECTOMY, LAPAROSCOPIC;  Surgeon: Steve Saucedo Jr., MD;  Location: [facility] OR;  Service: General;  Laterality: N/A;    LAPAROSCOPIC SALPINGECTOMY Bilateral 11/9/2022    Procedure: SALPINGECTOMY, LAPAROSCOPIC, REMOVAL OF NEXPLANON ;  Surgeon: Shivani Rodgers MD;  Location: [facility] OR;  Service: OB/GYN;  Laterality: Bilateral;    TONSILLECTOMY       Family History   Problem Relation Age of Onset    No Known Problems Mother     Breast cancer Neg Hx     Ovarian cancer Neg Hx      Social History     Tobacco Use    Smoking status: Never    Smokeless tobacco: Never   Substance Use  Topics    Alcohol use: No    Drug use: No     Review of Systems    Physical Exam     Initial Vitals [11/10/23 2130]   BP Pulse Resp Temp SpO2   (!) 128/90 (!) 141 16 98.4 °F (36.9 °C) 98 %      MAP       --         Physical Exam  General: Awake and alert, well-nourished  HENT: moist mucous membranes  Eyes: No conjunctival injection  Pulm: CTAB, no increased work of breathing  CV: Regular rate and rhythm, no murmur noted  Abdomen: Nondistended, tender in R mid abdomen, negative Torres's, no rebound tenderness  MSK: No LE edema  Skin: No rash noted  Neuro: No facial asymmetry, grossly normal movements of arms and legs  Psychiatric: Cooperative    ED Course   Procedures  Labs Reviewed   CBC W/ AUTO DIFFERENTIAL - Abnormal; Notable for the following components:       Result Value    WBC 13.01 (*)     Gran # (ANC) 8.7 (*)     All other components within normal limits   COMPREHENSIVE METABOLIC PANEL - Abnormal; Notable for the following components:    Calcium 10.7 (*)     Total Protein 8.5 (*)     All other components within normal limits    Narrative:     ADD ON CRP PER DR YESSI SOW/ORDER# [id number] @ 00:24   URINALYSIS, REFLEX TO URINE CULTURE - Abnormal; Notable for the following components:    Specific Gravity, UA >1.030 (*)     Protein, UA Trace (*)     All other components within normal limits    Narrative:     Specimen Source->Urine   HIV 1 / 2 ANTIBODY    Narrative:     Release to patient->Immediate   HEPATITIS C ANTIBODY    Narrative:     Release to patient->Immediate   LIPASE    Narrative:     ADD ON CRP PER DR YESSI SOW/ORDER# [id number] @ 00:24   TROPONIN I    Narrative:     ADD ON CRP PER DR YESSI SOW/ORDER# [id number] @ 00:24   C-REACTIVE PROTEIN   C-REACTIVE PROTEIN    Narrative:     ADD ON CRP PER DR YESSI SOW/ORDER# [id number] @ 00:24   POCT URINE PREGNANCY        ECG Results              EKG 12-lead (Final result)  Result time 11/11/23 10:06:18      Final result by  Interface, [facility] (11/11/23 10:06:18)                   Narrative:    Test Reason : R00.0,    Vent. Rate : 115 BPM     Atrial Rate : 115 BPM     P-R Int : 144 ms          QRS Dur : 080 ms      QT Int : 318 ms       P-R-T Axes : 041 070 010 degrees     QTc Int : 439 ms    Sinus tachycardia  Nonspecific T wave abnormality  Abnormal ECG  When compared with ECG of 26-APR-2023 07:36,  No significant change was found  Confirmed by Geronimo CROWELL MD ([id number]) on 11/11/2023 10:06:07 AM    Referred By: [email or web address]   SELF           Confirmed By:Geronimo CROWELL MD                                  Imaging Results              CT Abdomen Pelvis With IV Contrast (Final result)  Result time 11/11/23 02:35:18      Final result by Daniel Watson MD (11/11/23 02:35:18)                   Impression:      No CT evidence of acute intra-abdominal abnormality.    Appendectomy.    Additional incidental findings as above.      Electronically signed by: Daniel Watson MD  Date:    11/11/2023  Time:    02:35               Narrative:    EXAMINATION:  CT ABDOMEN PELVIS WITH IV CONTRAST    CLINICAL HISTORY:  Abdominal abscess/infection suspected;    TECHNIQUE:  Low dose axial images, sagittal and coronal reformations were obtained from the lung bases to the pubic symphysis following the IV administration of 100 mL of Omnipaque 350 .  Oral contrast was not given.    COMPARISON:  Abdominal ultrasound 11/11/2023, CT 10/30/2020    FINDINGS:  The lung bases are unremarkable.  There is no pleural fluid present.  The visualized portions of the heart appear normal.    The liver is normal in size and attenuation with no focal hepatic abnormality.  The gallbladder shows no evidence of stones or pericholecystic fluid.  There is no intra-or extrahepatic biliary ductal dilatation.    The stomach, spleen, pancreas, and adrenal glands are unremarkable.    Kidneys are normal in size and location and enhance symmetrically.  There is no evidence of  "hydronephrosis. The urinary bladder is unremarkable. Uterus appears within normal limits.  1.7 cm small cyst or prominent follicle noted within the right ovary.  No significant free fluid in the pelvis.    The abdominal aorta is normal in course and caliber without significant atherosclerotic calcifications.    The visualized loops of small and large bowel show no evidence of obstruction or inflammation.  There is mild scattered retained stool throughout the colon.  The appendix is surgically absent.  There is no ascites, portal venous gas, or free intraperitoneal air.    The visualized skeletal structures demonstrate no acute abnormality.  The extraperitoneal soft tissues are unremarkable.                                       X-Ray Shoulder Complete 2 View Right (Final result)  Result time 11/11/23 01:44:21      Final result by Taylor Tran MD (11/11/23 01:44:21)                   Impression:      No acute abnormality.      Electronically signed by: Taylor Tran  Date:    11/11/2023  Time:    01:44               Narrative:    EXAMINATION:  XR SHOULDER COMPLETE 2 OR MORE VIEWS RIGHT    CLINICAL HISTORY:  Pain in unspecified shoulder    TECHNIQUE:  Two or three views of the right shoulder were performed.    COMPARISON:  None    FINDINGS:  No acute fracture or dislocation of the right shoulder.  Acromioclavicular and glenohumeral joints are intact.  Soft tissues are intact.  Visualized right ribs are unremarkable.                                       X-Ray Chest AP Portable (Final result)  Result time 11/11/23 01:38:34      Final result by Lorenzo Edwards MD (11/11/23 01:38:34)                   Impression:      No acute cardiopulmonary finding identified on this single view.      Electronically signed by: Lorenzo Edwards MD  Date:    11/11/2023  Time:    01:38               Narrative:    EXAMINATION:  XR CHEST AP PORTABLE    CLINICAL HISTORY:  Provided history is "  Pain in right " "arm".    TECHNIQUE:  One view of the chest.    COMPARISON:  05/18/2022.    FINDINGS:  Cardiac silhouette is not enlarged.  No focal consolidation.  No sizable pleural effusion.  No pneumothorax.                                       US Abdomen Limited (Final result)  Result time 11/11/23 00:53:42      Final result by Ericka Mercado MD (11/11/23 00:53:42)                   Impression:      No cholelithiasis or evidence of acute cholecystitis.      Electronically signed by: Ericka Mercado  Date:    11/11/2023  Time:    00:53               Narrative:    EXAMINATION:  ULTRASOUND ABDOMEN LIMITED (GALLBLADDER)    CLINICAL HISTORY:  RUQ US;    TECHNIQUE:  Limited ultrasound of the right upper quadrant of the abdomen with attention to the gallbladder was performed.    COMPARISON:  None.    FINDINGS:  Gallbladder: No calculi.  No wall thickening, or pericholecystic fluid.  No sonographic Torres's sign.    Biliary system: The common duct is not dilated, measuring 3.5 mm.  No intrahepatic ductal dilatation.    Miscellaneous: No upper abdominal ascites.                                       Medications   sodium chloride 0.9% bolus 1,000 mL 1,000 mL (0 mLs Intravenous Stopped 11/11/23 0219)   ketorolac injection 9.999 mg (9.999 mg Intravenous Given 11/11/23 0117)   iohexoL (OMNIPAQUE 350) injection 100 mL (100 mLs Intravenous Given 11/11/23 0203)     Medical Decision Making  Pt overall well appearing.  Exam of R arm without clear evidence of local R arm pathology, mild pain with R shoulder movement but not enough to clearly suggest a focal cause, seriously doubt septic joint.  I have concern for referred source of pain to the R shoulder and arm and will evaluate for this, particularly given that she has mild R sided abdominal pain, may have irritation of diaphragm.  Gave IV fluids, toradol for symptoms with mild improvement.    Labs show mild leukocytosis with normal differential, may be infectious or inflammatory but may " be stress response.  CRP is normal which is reassuring against infectious or inflammatory cause given symptoms have been going on all day.  RUQ US without acute pathology.  Troponin and CMP normal, EKG nonischemic.  Doubt PE given no chest pain or shortness of breath and no pleuritic component.  CXR unremarkable and XR R shoulder without acute abnormality.  On re-eval she still has some R sided abdominal discomfort on exam, did shared decision making regarding CT imaging of abdomen and pt wants CT.  CT abdomen and UA are pending at time of signout.  If these are negative and pt feeling better likely will be ok for discharge with strict return precautions and PCP follow up.      Amount and/or Complexity of Data Reviewed  Labs: ordered.  Radiology: ordered.    Risk  Prescription drug management.               ED Course as of 11/13/23 2045   Sat Nov 11, 2023   0057 EKG interpretation: sinus tachycardia, no concerning ST changes to suggest acute ischemia, no STEMI. [JW]      ED Course User Index  [JW] Dominick Traore MD                    Clinical Impression:   Final diagnoses:  [R00.0] Tachycardia  [M25.519] Shoulder pain  [M79.601] Right arm pain        ED Disposition Condition    Discharge Stable          ED Prescriptions       Medication Sig Dispense Start Date End Date Auth. Provider    naproxen (NAPROSYN) 500 MG tablet Take 1 tablet (500 mg total) by mouth 2 (two) times daily with meals. for 7 days 14 tablet 11/11/2023 11/18/2023 Mariam Kelly MD          Follow-up Information       Follow up With Specialties Details Why Contact Info Additional Information    [facility] Schedule an appointment as soon as possible for a visit in 1 week  [id number]  [facility] [address] [phone] [facility] Please park in surface lot and check in at central registration desk             Dominick Traore MD  11/13/23  2047

## 2023-12-25 ENCOUNTER — ON-DEMAND VIRTUAL (OUTPATIENT)
Dept: URGENT CARE | Facility: CLINIC | Age: 28
End: 2023-12-25

## 2023-12-25 DIAGNOSIS — J34.89 NASAL CONGESTION WITH RHINORRHEA: ICD-10-CM

## 2023-12-25 DIAGNOSIS — R09.81 NASAL CONGESTION WITH RHINORRHEA: ICD-10-CM

## 2023-12-25 DIAGNOSIS — J11.1 INFLUENZA: Primary | ICD-10-CM

## 2023-12-25 PROCEDURE — 99213 PR OFFICE/OUTPT VISIT, EST, LEVL III, 20-29 MIN: ICD-10-PCS | Mod: 95,,, | Performed by: PHYSICIAN ASSISTANT

## 2023-12-25 PROCEDURE — 99213 OFFICE O/P EST LOW 20 MIN: CPT | Mod: 95,,, | Performed by: PHYSICIAN ASSISTANT

## 2023-12-25 RX ORDER — FLUTICASONE PROPIONATE 50 MCG
2 SPRAY, SUSPENSION (ML) NASAL DAILY PRN
Qty: 16 G | Refills: 0 | Status: SHIPPED | OUTPATIENT
Start: 2023-12-25 | End: 2024-01-24

## 2023-12-25 RX ORDER — OSELTAMIVIR PHOSPHATE 75 MG/1
75 CAPSULE ORAL 2 TIMES DAILY
Qty: 10 CAPSULE | Refills: 0 | Status: SHIPPED | OUTPATIENT
Start: 2023-12-25 | End: 2023-12-30

## 2023-12-25 NOTE — PATIENT INSTRUCTIONS
Recommend oral antihistamine (Claritin/zyrtec)+/- oral decongestant (pseudoephedrine) for rhinorrhea, steroid nasal spray (flonase), prescription/OTC cough medicine (dayquil/nyquil),  Tylenol (Acetaminophen) and/or Motrin (Ibuprofen) as directed for control of pain and/or fever.    The most common side effects of oseltamivir (TAMIFLU) are nausea and vomiting. Usually, nausea and vomiting are not severe and happen in the first 2 days of treatment. Taking Tamiflu with food may lessen the chance of getting these side effects. Other side effects include stomach (abdominal) pain, nosebleeds, headache, and feeling tired (fatigue).  Psychiatric effects such as anxiety, irritation, delirium, hallucinations, and nightmares have been reported.         Please drink plenty of fluids.  Please get plenty of rest.  Nasal irrigation with a saline spray or Netti Pot like device per their directions is also recommended.  If you  smoke, please stop smoking.    To help ease a sore throat, you can:  Use a sore throat spray.  Suck on hard candy or throat lozenges.  Gargle with warm saltwater a few times each day. Mix of 1/4 teaspoon (1.25 grams) salt in 8 ounces (240 mL) of warm water.  Use a cool mist humidifier to help you breathe easier.    If you negative (-) for a COVID test today and you are continuing to have symptoms, it is recommended to repeat the test in 48 hours x 3. If you continue to be negative, you may return to school/work once you have improved symptoms and no fever for 24 hours without any medications. This applies to all viral illnesses.     Discussed prescriptions and over-the-counter medicines to help with patient's symptoms:  A steroid nose spray (flonase) can help with a stuffy nose. It can also help with drainage down the back of your throat.  An antihistamine (loratadine,zyrtec,allegra, xyzal) can help with itching, sneezing, or runny nose.  An antihistamine eye drop can help with itchy eyes.  A decongestant  (pseudoephedrine,  Phenylephrine) can help with a stuffy nose. Take <10 days for congestion and rhinorrhea. Once symptoms improve, proceed with loratadine/zyrtec once a day. These ingredients can keep you up all night, decrease appetite, feel jittery, and raise blood pressure with long term use.  OTC Coricidin can be used for patients with hypertension and palpitations because you cannot use ingredients such as pseudoephedrine and phenylephrine for oral decongestants.    Medications that control cough are suppressants and expectorants. Suppressants are tessalon pearls and dextromethorphan. If you have a productive cough with sputum, you need an expectorant called guaifenesin. Dextromethorphan and Guaifenesin are active ingredients in many OTC cough/cold medications such as Dayquil/Nyquil, Mucinex, and Robitussin Mucus+Chest Congestion.            Common Cold Medicine Ingredients Cheat sheet  Acetaminophen (APAP) -pain reliever/fever reducer  Dextromethorphan - cough suppressant  Guaifenesin - expectorant/thins and loosens mucus  Phenylephrine - nasal decongestant  Diphenhydramine or Doxylamine succinate - antihistamine, helps you fall asleep  Promethazine or Brompheniramine - Prescription strength antihistamines    These OTC cold medications are safe to use if you do not have high blood pressure (hypertension) or palpitations.  DayQuil and NyQuil - Cough, Cold & Flu Relief LiquiCaps  DayQuil: Acetaminophen, Dextromethorphan, and Phenylephrine   NyQuil: Acetaminophen, Dextromethorphan, and Doxylamine  DayQuil and NyQuil SEVERE Maximum Strength Cough, Cold & Flu Relief LiquiCaps  DAYQUIL: Acetaminophen, Dextromethorphan, Guaifenesin, and Phenylephrine  NYQUIL: Acetaminophen, Dextromethorphan, Doxylamine, and Phenylephrine   Mucinex DM: Guaifenesin,Dextromethorphan  Mucinex Maximum Strength Sinus-Max® Pressure, Pain & Cough Liquid Gels: Acetaminophen/Dextromethorphan/ Guaifenesin/Phenylephrine       If not allergic,  take Tylenol (Acetaminophen) 650 mg to  1 g every 6 hours as needed for fever and/or Motrin (Ibuprofen) 600 to 800 mg every 6 hours as needed for fever as directed for control of pain and/or fever      Please remember that you have received care at [facility] today. Telehealth visits and Urgent cares are not emergency rooms and are not equipped to handle life threatening emergencies and cannot rule in or out certain medical conditions and you may be released before all of your medical problems are known or treated. Further, in-person, evaluation may be necessary for continued treatment. Please arrange follow up with your primary care physician or speciality clinic within 2-5 days if your signs and symptoms have not resolved or worsen. Patient can call our Referral Hotline at [phone] to make an appointment.    Please go to the Emergency Department for any concerns or worsening of condition.Patient was educated on signs/symptoms that would warrant emergent medical attention. Patient verbalized understanding.    Signs of infection. These include a fever of 100.4°F (38°C) or higher, chills, cough, more sputum or change in color of sputum.  You are having so much trouble breathing that you can only say one or two words at a time.  You need to sit upright at all times to be able to breathe and or cannot lie down.  You have trouble breathing when talking or sitting still.  You have a fever of 100.4°F (38°C) or higher or chills.  You have chest pain when you cough, have trouble breathing but can still talk in full sentences, or cough up blood.

## 2023-12-25 NOTE — LETTER
"  December 25, 2023      Virtual Visit - Urgent Care  [address] [facility] [address]       Patient: Thelma Roman   YOB: 1995  Date of Visit: 12/25/2023    To Whom It May Concern:    Oriana Roman  was at [facility] on 12/25/2023. The patient may return to work/school on 12/27/23 with no restrictions. If you have any questions or concerns, or if I can be of further assistance, please do not hesitate to contact me.    Sincerely,        Silviaharoon Nicole PA-C (Jackie)       "

## 2023-12-25 NOTE — PROGRESS NOTES
Subjective:      Patient ID: Thelma Roman is a 28 y.o. female.    Vitals:  vitals were not taken for this visit.     Chief Complaint: headache, cough, body pain      Visit Type: TELE AUDIOVISUAL    Present with the patient at the time of consultation: TELEMED PRESENT WITH PATIENT: None    Past Medical History:   Diagnosis Date    Acute appendicitis 10/30/2020    Anxiety     Chronic pain of right knee 11/18/2018    Depression     Panic disorder without agoraphobia 9/16/2015     Past Surgical History:   Procedure Laterality Date    CARPAL TUNNEL RELEASE      FACIAL RECONSTRUCTION SURGERY      LAPAROSCOPIC APPENDECTOMY N/A 10/31/2020    Procedure: APPENDECTOMY, LAPAROSCOPIC;  Surgeon: Steve Saucedo Jr., MD;  Location: [facility] OR;  Service: General;  Laterality: N/A;    LAPAROSCOPIC SALPINGECTOMY Bilateral 11/9/2022    Procedure: SALPINGECTOMY, LAPAROSCOPIC, REMOVAL OF NEXPLANON ;  Surgeon: Shivani Rodgers MD;  Location: [facility] OR;  Service: OB/GYN;  Laterality: Bilateral;    TONSILLECTOMY       Review of patient's allergies indicates:  No Known Allergies  Current Outpatient Medications on File Prior to Visit   Medication Sig Dispense Refill    buPROPion (WELLBUTRIN XL) 300 MG 24 hr tablet Take 1 tablet (300 mg total) by mouth once daily. 90 tablet 1    hydrOXYzine pamoate (VISTARIL) 25 MG Cap Take 2 capsules (50 mg total) by mouth nightly as needed (insomnia). 60 capsule 3    ibuprofen (ADVIL,MOTRIN) 600 MG tablet Take 1 tablet (600 mg total) by mouth every 6 (six) hours as needed for Pain. 20 tablet 0    propranoloL (INDERAL) 20 MG tablet Take 1 tablet (20 mg total) by mouth daily as needed (Use as-needed for panic attacks). 30 tablet 0    venlafaxine (EFFEXOR-XR) 75 MG 24 hr capsule Take 1 capsule (75 mg total) by mouth once daily. Take with 150mg tablet for a total of 225mg. 90 capsule 1     No current facility-administered medications on file prior to visit.     Family History   Problem Relation Age of Onset    No  Known Problems Mother     Breast cancer Neg Hx     Ovarian cancer Neg Hx        Medications Ordered                [id number] - [facility] [address] [facility] [address]   [address] [facility] [place] [address]    Telephone: [phone]   Fax: [phone]   Hours: Not open 24 hours                         E-Prescribed (2 of 2)              fluticasone propionate (FLONASE) 50 mcg/actuation nasal spray    Si sprays (100 mcg total) by Each Nostril route daily as needed for Rhinitis or Allergies.       Start: 23     Quantity: 16 g Refills: 0                         oseltamivir (TAMIFLU) 75 MG capsule    Sig: Take 1 capsule (75 mg total) by mouth 2 (two) times daily. for 5 days       Start: 23     Quantity: 10 capsule Refills: 0                           Ohs Peq Odvv Intake    2023  5:16 PM CST - Filed by Patient   Describe your reason for todays visit Cough, headache and overall body pains   What is your current physical address in the event of a medical emergency? [address]   Are you able to take your vital signs? No   Please attach any relevant images or files          Headache,cough, bodyaches for 3 days  Exposure to sick contacts at work  Body aches rated as 6/10; wants to sit in hot-tub all day  Tried ibuprofen 200 mg and lozenges    Cough  This is a new problem. The problem has been gradually worsening. The problem occurs constantly. The cough is Non-productive. Associated symptoms include headaches, myalgias, nasal congestion, postnasal drip, rhinorrhea and a sore throat. Pertinent negatives include no chills, fever, rash, weight loss or wheezing. Nothing aggravates the symptoms. Risk factors for lung disease include occupational exposure. Treatments tried: NSAIDS. The treatment provided mild relief. There is no history of asthma, COPD, environmental allergies or pneumonia.       Constitution: Negative for chills and  fever.   HENT:  Positive for postnasal drip and sore throat.    Respiratory:  Positive for cough. Negative for wheezing.    Musculoskeletal:  Positive for muscle ache.   Skin:  Negative for rash.   Allergic/Immunologic: Negative for environmental allergies.   Neurological:  Positive for headaches. Negative for history of migraines.        Objective:   The physical exam was conducted virtually.  Physical Exam   Constitutional: She is oriented to person, place, and time. normal  HENT:   Head: Normocephalic and atraumatic.   Ears:   Right Ear: External ear normal.   Left Ear: External ear normal.   Nose: Nose normal.   Eyes: Conjunctivae are normal. Extraocular movement intact   Abdominal: Normal appearance.   Neurological: She is alert and oriented to person, place, and time.   Psychiatric: Her behavior is normal. Mood, judgment and thought content normal.       Assessment:     1. Influenza    2. Nasal congestion with rhinorrhea        Plan:       Influenza  -     oseltamivir (TAMIFLU) 75 MG capsule; Take 1 capsule (75 mg total) by mouth 2 (two) times daily. for 5 days  Dispense: 10 capsule; Refill: 0    Nasal congestion with rhinorrhea  -     fluticasone propionate (FLONASE) 50 mcg/actuation nasal spray; 2 sprays (100 mcg total) by Each Nostril route daily as needed for Rhinitis or Allergies.  Dispense: 16 g; Refill: 0                  Discussed results/diagnosis/plan with patient in clinic.  We had shared decision making for patient's treatment. Patient verbalized understanding and in agreement with current treatment plan.     Please remember that you have received care at [facility] today. Telehealth visits  are not equipped to handle life threatening emergencies and cannot rule in or out certain medical conditions and you may be released before all of your medical problems are known or treated.    Patient was instructed to return for re-evaluation with urgent care or PCP for continued  "outpatient workup and management if symptoms do not improve/worsening symptoms. Strict ED versus clinic precautions given in depth.    Discharge and follow-up instructions given verbally with the patient who expressed understanding. The After Visit Summary (AVS) and  instructions and results are also available on [facility].              [facility] "Willie Nicole PA-C          Patient Instructions   Recommend oral antihistamine (Claritin/zyrtec)+/- oral decongestant (pseudoephedrine) for rhinorrhea, steroid nasal spray (flonase), prescription/OTC cough medicine (dayquil/nyquil),  Tylenol (Acetaminophen) and/or Motrin (Ibuprofen) as directed for control of pain and/or fever.    The most common side effects of oseltamivir (TAMIFLU) are nausea and vomiting. Usually, nausea and vomiting are not severe and happen in the first 2 days of treatment. Taking Tamiflu with food may lessen the chance of getting these side effects. Other side effects include stomach (abdominal) pain, nosebleeds, headache, and feeling tired (fatigue).  Psychiatric effects such as anxiety, irritation, delirium, hallucinations, and nightmares have been reported.         Please drink plenty of fluids.  Please get plenty of rest.  Nasal irrigation with a saline spray or Netti Pot like device per their directions is also recommended.  If you  smoke, please stop smoking.    To help ease a sore throat, you can:  Use a sore throat spray.  Suck on hard candy or throat lozenges.  Gargle with warm saltwater a few times each day. Mix of 1/4 teaspoon (1.25 grams) salt in 8 ounces (240 mL) of warm water.  Use a cool mist humidifier to help you breathe easier.    If you negative (-) for a COVID test today and you are continuing to have symptoms, it is recommended to repeat the test in 48 hours x 3. If you continue to be negative, you may return to school/work once you have improved symptoms and no fever for 24 hours without any medications. This applies to all viral " illnesses.     Discussed prescriptions and over-the-counter medicines to help with patient's symptoms:  A steroid nose spray (flonase) can help with a stuffy nose. It can also help with drainage down the back of your throat.  An antihistamine (loratadine,zyrtec,allegra, xyzal) can help with itching, sneezing, or runny nose.  An antihistamine eye drop can help with itchy eyes.  A decongestant (pseudoephedrine,  Phenylephrine) can help with a stuffy nose. Take <10 days for congestion and rhinorrhea. Once symptoms improve, proceed with loratadine/zyrtec once a day. These ingredients can keep you up all night, decrease appetite, feel jittery, and raise blood pressure with long term use.  OTC Coricidin can be used for patients with hypertension and palpitations because you cannot use ingredients such as pseudoephedrine and phenylephrine for oral decongestants.    Medications that control cough are suppressants and expectorants. Suppressants are tessalon pearls and dextromethorphan. If you have a productive cough with sputum, you need an expectorant called guaifenesin. Dextromethorphan and Guaifenesin are active ingredients in many OTC cough/cold medications such as Dayquil/Nyquil, Mucinex, and Robitussin Mucus+Chest Congestion.            Common Cold Medicine Ingredients Cheat sheet  Acetaminophen (APAP) -pain reliever/fever reducer  Dextromethorphan - cough suppressant  Guaifenesin - expectorant/thins and loosens mucus  Phenylephrine - nasal decongestant  Diphenhydramine or Doxylamine succinate - antihistamine, helps you fall asleep  Promethazine or Brompheniramine - Prescription strength antihistamines    These OTC cold medications are safe to use if you do not have high blood pressure (hypertension) or palpitations.  DayQuil and NyQuil - Cough, Cold & Flu Relief LiquiCaps  DayQuil: Acetaminophen, Dextromethorphan, and Phenylephrine   NyQuil: Acetaminophen, Dextromethorphan, and Doxylamine  DayQuil and NyQuil SEVERE  Maximum Strength Cough, Cold & Flu Relief LiquiCaps  DAYQUIL: Acetaminophen, Dextromethorphan, Guaifenesin, and Phenylephrine  NYQUIL: Acetaminophen, Dextromethorphan, Doxylamine, and Phenylephrine   Mucinex DM: Guaifenesin,Dextromethorphan  Mucinex Maximum Strength Sinus-Max® Pressure, Pain & Cough Liquid Gels: Acetaminophen/Dextromethorphan/ Guaifenesin/Phenylephrine       If not allergic, take Tylenol (Acetaminophen) 650 mg to  1 g every 6 hours as needed for fever and/or Motrin (Ibuprofen) 600 to 800 mg every 6 hours as needed for fever as directed for control of pain and/or fever          Please remember that you have received care at an urgent care today. Urgent cares are not emergency rooms and are not equipped to handle life threatening emergencies and cannot rule in or out certain medical conditions and you may be released before all of your medical problems are known or treated.     Please arrange follow up with your primary care physician or speciality clinic within 2-5 days if your signs and symptoms have not resolved or worsen.     Patient can call our Referral Hotline at [phone] to make an appointment.      Please return here or go to the Emergency Department for any concerns or worsening of condition.  Signs of infection. These include a fever of 100.4°F (38°C) or higher, chills, cough, more sputum or change in color of sputum.  You are having so much trouble breathing that you can only say one or two words at a time.  You need to sit upright at all times to be able to breathe and or cannot lie down.  You have trouble breathing when talking or sitting still.  You have a fever of 100.4°F (38°C) or higher or chills.  You have chest pain when you cough, have trouble breathing but can still talk in full sentences, or cough up blood.

## 2024-01-08 NOTE — PROGRESS NOTES
Patient presented to physical therapy this morning. However, the patient was admitted to the ER yesterday with complaints of fever, abdominal pain, and diarrhea. Patient was diagnosed with a viral illness yesterday evening, but was discharged. Patient was educated it was best to hold therapy today and wait 24 hours to make sure her symptoms are gone. Patient was agreeable.      Physical Therapy Visit    Visit Type: Daily Treatment Note  Visit: 5  Referring Provider: Brandie Rashid NP  Medical Diagnosis (from order): M25.562, G89.29 - Chronic pain of left knee     SUBJECTIVE                                                                                                               Patient reports 1/10 pain in the left knee today, stating that it's been \"good.\"   Functional Change: Improved ease with walking and getting out of a chair.       OBJECTIVE                                                                                                                                       Treatment     Therapeutic Exercise  - Recumbent bicycle lvl 2 x 8 minutes to improve blood flow, improve tissue mobility, reduce joint stiffness, and promote release of endogenous opioids for pain reduction.  - Standing heel raise - 2x10  - Incline calf stretch - 2x30 seconds bilateral  - Standing straight leg raise - 2x10 bilateral, 1#  - Seated long arc quadriceps - 2x10 bilateral, 1#  - Standing hamstring curl - 15x bilateral, 1#  - Seated hamstring stretch - 2x30 seconds bilateral   - Standing quadriceps stretch (holding chair, foot on lowered table) - 2x30 seconds bilateral    Manual Therapy   Patellar mobilizations all planes x 2 minutes    Skilled input: verbal instruction/cues, posture correction, facilitation, tactile instruction/cues, demonstration and as detailed above    Writer verbally educated and received verbal consent for hand placement, positioning of patient, and techniques to be performed today from patient for clothing adjustments for techniques, therapist position for techniques and hand placement and palpation for techniques as described above and how they are pertinent to the patient's plan of care.  Home Exercise Program  Access Code: [email or web address]  URL: [email or web address]  Date: 12/26/2023  Prepared by: María Baird    Exercises  - Supine Quad Set  - 3 x daily  - 10 reps - 5 hold  - Hooklying Gluteal Sets  - 3 x daily - 10 reps - 5 hold  - Seated Long Arc Quad  - 3 x daily - 10 reps  - Seated Hamstring Stretch  - 1 x daily - 7 x weekly - 3 sets - 10 reps  - Standing Knee Flexion AROM with Chair Support  - 3 x daily - 10 reps      ASSESSMENT                                                                                                            Patient responded well to today's session with emphasis on lower extremity flexibility, strength and generalized endurance. He notes increased patellofemoral \"stiffness\" following quadriceps stretching at the end of the session; eliminated with gentle patellar mobilizations in all planes. He notes shortness of breath during the session; cueing to avoid valsalva provided with improved activity tolerance following.   Pain/symptoms after session (out of 10): 0  Education:   - Results of above outlined education: Verbalizes understanding, Demonstrates understanding and Needs reinforcement    PLAN                                                                                                                           Suggestions for next session as indicated: Progress per plan of care.       Therapy procedure time and total treatment time can be found documented on the Time Entry flowsheet

## 2024-04-11 ENCOUNTER — OFFICE VISIT (OUTPATIENT)
Dept: PSYCHIATRY | Facility: CLINIC | Age: 29
End: 2024-04-11
Payer: COMMERCIAL

## 2024-04-11 DIAGNOSIS — F95.9 TIC DISORDER: ICD-10-CM

## 2024-04-11 DIAGNOSIS — F41.1 GENERALIZED ANXIETY DISORDER WITH PANIC ATTACKS: ICD-10-CM

## 2024-04-11 DIAGNOSIS — F33.40 RECURRENT MAJOR DEPRESSIVE DISORDER, IN REMISSION: ICD-10-CM

## 2024-04-11 DIAGNOSIS — F41.0 GENERALIZED ANXIETY DISORDER WITH PANIC ATTACKS: ICD-10-CM

## 2024-04-11 DIAGNOSIS — F43.10 PTSD (POST-TRAUMATIC STRESS DISORDER): Primary | ICD-10-CM

## 2024-04-11 PROCEDURE — 99215 OFFICE O/P EST HI 40 MIN: CPT | Mod: S$GLB,,, | Performed by: STUDENT IN AN ORGANIZED HEALTH CARE EDUCATION/TRAINING PROGRAM

## 2024-04-11 PROCEDURE — 99999 PR PBB SHADOW E&M-EST. PATIENT-LVL II: CPT | Mod: PBBFAC,,, | Performed by: STUDENT IN AN ORGANIZED HEALTH CARE EDUCATION/TRAINING PROGRAM

## 2024-04-11 RX ORDER — CLONIDINE HYDROCHLORIDE 0.1 MG/1
0.1 TABLET ORAL DAILY
Qty: 30 TABLET | Refills: 1 | Status: SHIPPED | OUTPATIENT
Start: 2024-04-11 | End: 2024-05-16

## 2024-04-11 RX ORDER — BUPROPION HYDROCHLORIDE 300 MG/1
300 TABLET ORAL DAILY
Qty: 30 TABLET | Refills: 1 | Status: SHIPPED | OUTPATIENT
Start: 2024-04-11 | End: 2024-05-16 | Stop reason: SDUPTHER

## 2024-04-11 RX ORDER — VENLAFAXINE HYDROCHLORIDE 150 MG/1
150 CAPSULE, EXTENDED RELEASE ORAL DAILY
Qty: 30 CAPSULE | Refills: 1 | Status: SHIPPED | OUTPATIENT
Start: 2024-04-11 | End: 2024-05-16 | Stop reason: SDUPTHER

## 2024-04-11 RX ORDER — VENLAFAXINE HYDROCHLORIDE 75 MG/1
75 CAPSULE, EXTENDED RELEASE ORAL DAILY
Qty: 30 CAPSULE | Refills: 1 | Status: SHIPPED | OUTPATIENT
Start: 2024-04-11 | End: 2024-05-16 | Stop reason: SDUPTHER
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
## 2024-04-19 NOTE — PROGRESS NOTES
STAFF COMMENTS: I have discussed pt with Dr. Hensley and reviewed the history and exam. I agree and concur with the assessment and plan.

## 2024-05-14 ENCOUNTER — OFFICE VISIT (OUTPATIENT)
Dept: PRIMARY CARE CLINIC | Facility: CLINIC | Age: 29
End: 2024-05-14
Payer: COMMERCIAL

## 2024-05-14 DIAGNOSIS — L29.9 PRURITUS: ICD-10-CM

## 2024-05-14 DIAGNOSIS — L27.0 ALLERGIC DRUG RASH DUE TO SULFONAMIDE: Primary | ICD-10-CM

## 2024-05-14 DIAGNOSIS — T37.0X5A ALLERGIC DRUG RASH DUE TO SULFONAMIDE: Primary | ICD-10-CM

## 2024-05-14 PROCEDURE — 99203 OFFICE O/P NEW LOW 30 MIN: CPT | Mod: 95,,,

## 2024-05-14 PROCEDURE — 1159F MED LIST DOCD IN RCRD: CPT | Mod: CPTII,95,,

## 2024-05-14 PROCEDURE — 1160F RVW MEDS BY RX/DR IN RCRD: CPT | Mod: CPTII,95,,

## 2024-05-14 RX ORDER — HYDROXYZINE PAMOATE 25 MG/1
25 CAPSULE ORAL EVERY 6 HOURS PRN
Qty: 28 CAPSULE | Refills: 0 | Status: SHIPPED | OUTPATIENT
Start: 2024-05-14 | End: 2024-05-16

## 2024-05-14 RX ORDER — PROMETHAZINE HYDROCHLORIDE 25 MG/1
25 TABLET ORAL
COMMUNITY
Start: 2024-04-22

## 2024-05-14 RX ORDER — CEFADROXIL 500 MG/1
500 CAPSULE ORAL 2 TIMES DAILY
COMMUNITY
Start: 2024-04-22

## 2024-05-14 RX ORDER — CETIRIZINE HYDROCHLORIDE 10 MG/1
10 TABLET ORAL NIGHTLY
Qty: 30 TABLET | Refills: 0 | Status: SHIPPED | OUTPATIENT
Start: 2024-05-14 | End: 2024-06-13

## 2024-05-14 RX ORDER — HYDROCODONE BITARTRATE AND ACETAMINOPHEN 5; 325 MG/1; MG/1
1 TABLET ORAL EVERY 4 HOURS PRN
COMMUNITY
Start: 2024-04-22

## 2024-05-14 RX ORDER — DIAZEPAM 5 MG/1
5-10 TABLET ORAL EVERY 6 HOURS PRN
COMMUNITY
Start: 2024-04-22 | End: 2024-05-16

## 2024-05-14 NOTE — PROGRESS NOTES
The patient's location is:  Patient's Home   The chief complaint leading to consultation is:  Allergic drug rash due to sulfonamide [L27.0, T37.0X5A]    Visit type: audiovisual    Time spent in discussion with the patient: 20  30 minutes of total time spent on the encounter. This includes time spent in discussion with the patient and time preparing for the patient appointment: review of tests, obtaining and/or reviewing separately obtained history, documenting clinical information in the electronic or other health record, independently interpreting results (not separately reported), and communicating results to the patient/family/caregiver or care coordination (not separately reported).     Each patient to whom he or she provides medical services by telemedicine is: (1) informed of the relationship between the physician and patient and the respective role of any other health care provider with respect to management of the patient; and (2) notified that he or she may decline to receive medical services by telemedicine and may withdraw from such care at any time.      Subjective:   Thelma Roman is a 28 y.o. female who presents for Allergic drug rash due to sulfonamide [L27.0, T37.0X5A].    Patient reports she had chin lipo about 3 weeks ago and was started on Bactrim about one week ago. Patient reports she started with a itchy rash last night. Patient reports the rash is on her chest, upper back, bilateral upper extremities to her wrists. Patient states rash does not extend below her waist. Patient reports surgeon aware of allergic reaction, and told no need for further antibiotic treatment. Patient denies any sore throat, chest pains, shortness of breath, difficulty swallowing, or facial swelling. Patient reports temperature of 99.6. Patient reports she tried hydrocortisone cream to help with the itching, reports mild relief.      Rash  This is a new problem. The current episode started yesterday. The problem has  been gradually worsening since onset. The affected locations include the head, face, neck, chest, back, left ear, left shoulder, left arm, left elbow, right ear, right shoulder, right arm and right elbow. The rash is characterized by burning, redness and itchiness. She was exposed to a new medication. Associated symptoms include fatigue and a fever. Pertinent negatives include no anorexia, congestion, cough, diarrhea, eye pain, facial edema, joint pain, nail changes, rhinorrhea, shortness of breath, sore throat or vomiting. Past treatments include anti-itch cream. The treatment provided mild relief. There is no history of allergies, asthma, eczema or varicella.        Review of Systems   Constitutional:  Positive for fatigue and fever. Negative for activity change and chills.   HENT:  Negative for congestion, facial swelling, rhinorrhea and sore throat.    Eyes:  Negative for pain.   Respiratory:  Negative for cough, chest tightness and shortness of breath.    Cardiovascular:  Negative for chest pain and palpitations.   Gastrointestinal:  Negative for abdominal pain, anorexia, constipation, diarrhea, nausea and vomiting.   Genitourinary:  Negative for dysuria.   Musculoskeletal:  Negative for back pain, joint pain and joint swelling.   Skin:  Positive for rash. Negative for nail changes and wound.   Neurological:  Negative for dizziness, speech difficulty, light-headedness and headaches.   Psychiatric/Behavioral:  Negative for behavioral problems, dysphoric mood and sleep disturbance. The patient is not nervous/anxious.          Current Outpatient Medications   Medication Instructions    buPROPion (WELLBUTRIN XL) 300 mg, Oral, Daily    cefadroxil (DURICEF) 500 mg, Oral, 2 times daily    cetirizine (ZYRTEC) 10 mg, Oral, Nightly    cloNIDine (CATAPRES) 0.1 mg, Oral, Daily    diazePAM (VALIUM) 5-10 mg, Oral, Every 6 hours PRN    HYDROcodone-acetaminophen (NORCO) 5-325 mg per tablet 1 tablet, Oral, Every 4 hours PRN     hydrOXYzine pamoate (VISTARIL) 25 mg, Oral, Every 6 hours PRN    ibuprofen (ADVIL,MOTRIN) 600 mg, Oral, Every 6 hours PRN    promethazine (PHENERGAN) 25 mg, Oral    propranoloL (INDERAL) 20 mg, Oral, Daily PRN    venlafaxine (EFFEXOR-XR) 75 mg, Oral, Daily, Take with 150mg tablet for a total of 225mg.    venlafaxine (EFFEXOR-XR) 150 mg, Oral, Daily       Objective:   No home vitals reported.     Physical Exam  Vitals reviewed.   Constitutional:       General: She is not in acute distress.     Appearance: Normal appearance.   HENT:      Right Ear: External ear normal.      Left Ear: External ear normal.      Nose: Nose normal.      Mouth/Throat:      Mouth: Mucous membranes are moist.   Eyes:      Extraocular Movements: Extraocular movements intact.      Conjunctiva/sclera: Conjunctivae normal.      Pupils: Pupils are equal, round, and reactive to light.   Pulmonary:      Effort: Pulmonary effort is normal. No respiratory distress.   Musculoskeletal:         General: No swelling. Normal range of motion.      Cervical back: Normal range of motion.   Skin:     Findings: Rash present.      Comments: Diffuse pinkish red rash noted to bilateral upper extermities   Neurological:      General: No focal deficit present.      Mental Status: She is alert and oriented to person, place, and time.   Psychiatric:         Mood and Affect: Mood normal.         Behavior: Behavior normal.           Assessment/Plan:        Thelma was seen today for rash.    Diagnoses and all orders for this visit:    Allergic drug rash due to sulfonamide  New. Ordered hydroxyzine and cetirizine. Counseled patient on monitoring for worsening signs and symptoms like new onset of shortness of breath, chest pains, fever, difficulty swallowing, trouble speaking to name a few but not limited too; instructed to go to ER if she starts experiencing any of those symptoms, patient verbalized understanding. Counseled to use calamine clear lotion to help with the  itching.   -     hydrOXYzine pamoate (VISTARIL) 25 MG Cap; Take 1 capsule (25 mg total) by mouth every 6 (six) hours as needed (Itching).  -     cetirizine (ZYRTEC) 10 MG tablet; Take 1 tablet (10 mg total) by mouth every evening.    Pruritus  New. Ordered hydroxyzine and cetirizine. Counseled patient on monitoring for worsening signs and symptoms like new onset of shortness of breath, chest pains, fever, difficulty swallowing, trouble speaking to name a few but not limited too. Counseled to use calamine clear lotion to help with the itching.  -     hydrOXYzine pamoate (VISTARIL) 25 MG Cap; Take 1 capsule (25 mg total) by mouth every 6 (six) hours as needed (Itching).  -     cetirizine (ZYRTEC) 10 MG tablet; Take 1 tablet (10 mg total) by mouth every evening.              NAS Foster  [facility]   05/14/2024

## 2024-05-16 ENCOUNTER — OFFICE VISIT (OUTPATIENT)
Dept: PSYCHIATRY | Facility: CLINIC | Age: 29
End: 2024-05-16
Payer: COMMERCIAL

## 2024-05-16 DIAGNOSIS — F95.9 TIC DISORDER: ICD-10-CM

## 2024-05-16 DIAGNOSIS — F43.10 PTSD (POST-TRAUMATIC STRESS DISORDER): ICD-10-CM

## 2024-05-16 DIAGNOSIS — F41.0 GENERALIZED ANXIETY DISORDER WITH PANIC ATTACKS: Primary | ICD-10-CM

## 2024-05-16 DIAGNOSIS — F41.1 GENERALIZED ANXIETY DISORDER WITH PANIC ATTACKS: Primary | ICD-10-CM

## 2024-05-16 PROCEDURE — 99212 OFFICE O/P EST SF 10 MIN: CPT | Mod: 95,,, | Performed by: STUDENT IN AN ORGANIZED HEALTH CARE EDUCATION/TRAINING PROGRAM

## 2024-05-16 RX ORDER — BUPROPION HYDROCHLORIDE 300 MG/1
300 TABLET ORAL DAILY
Qty: 30 TABLET | Refills: 1 | Status: SHIPPED | OUTPATIENT
Start: 2024-05-16 | End: 2024-07-15

## 2024-05-16 RX ORDER — CLONIDINE HYDROCHLORIDE 0.2 MG/1
0.2 TABLET ORAL NIGHTLY
Qty: 30 TABLET | Refills: 1 | Status: SHIPPED | OUTPATIENT
Start: 2024-05-16 | End: 2024-07-15

## 2024-05-16 RX ORDER — VENLAFAXINE HYDROCHLORIDE 150 MG/1
150 CAPSULE, EXTENDED RELEASE ORAL DAILY
Qty: 30 CAPSULE | Refills: 1 | Status: SHIPPED | OUTPATIENT
Start: 2024-05-16 | End: 2024-07-15

## 2024-05-16 RX ORDER — PROPRANOLOL HYDROCHLORIDE 20 MG/1
20 TABLET ORAL DAILY PRN
Qty: 30 TABLET | Refills: 0 | Status: SHIPPED | OUTPATIENT
Start: 2024-05-16 | End: 2025-05-16

## 2024-05-16 RX ORDER — VENLAFAXINE HYDROCHLORIDE 75 MG/1
75 CAPSULE, EXTENDED RELEASE ORAL DAILY
Qty: 30 CAPSULE | Refills: 1 | Status: SHIPPED | OUTPATIENT
Start: 2024-05-16 | End: 2024-07-15
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
## 2024-06-02 ENCOUNTER — PATIENT MESSAGE (OUTPATIENT)
Dept: PSYCHIATRY | Facility: CLINIC | Age: 29
End: 2024-06-02
Payer: COMMERCIAL

## 2024-06-20 ENCOUNTER — OFFICE VISIT (OUTPATIENT)
Dept: PSYCHIATRY | Facility: CLINIC | Age: 29
End: 2024-06-20
Payer: COMMERCIAL

## 2024-06-20 DIAGNOSIS — F43.10 PTSD (POST-TRAUMATIC STRESS DISORDER): ICD-10-CM

## 2024-06-20 DIAGNOSIS — F41.1 GENERALIZED ANXIETY DISORDER WITH PANIC ATTACKS: Primary | ICD-10-CM

## 2024-06-20 DIAGNOSIS — F41.0 GENERALIZED ANXIETY DISORDER WITH PANIC ATTACKS: Primary | ICD-10-CM

## 2024-06-20 RX ORDER — BUPROPION HYDROCHLORIDE 150 MG/1
150 TABLET ORAL DAILY
Qty: 30 TABLET | Refills: 1 | Status: SHIPPED | OUTPATIENT
Start: 2024-06-20 | End: 2024-08-19
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
## 2024-06-24 ENCOUNTER — TELEPHONE (OUTPATIENT)
Dept: PRIMARY CARE CLINIC | Facility: CLINIC | Age: 29
End: 2024-06-24

## 2024-06-24 ENCOUNTER — E-VISIT (OUTPATIENT)
Dept: PRIMARY CARE CLINIC | Facility: CLINIC | Age: 29
End: 2024-06-24
Attending: FAMILY MEDICINE
Payer: COMMERCIAL

## 2024-06-24 DIAGNOSIS — Z00.01 ENCOUNTER FOR GENERAL ADULT MEDICAL EXAMINATION WITH ABNORMAL FINDINGS: Primary | ICD-10-CM

## 2024-06-24 PROCEDURE — 99499 UNLISTED E&M SERVICE: CPT | Mod: ,,, | Performed by: FAMILY MEDICINE

## 2024-06-24 NOTE — TELEPHONE ENCOUNTER
Left patient a voice message to return a call to the clinic so that we can get her set up for an in person appt and labs

## 2024-06-24 NOTE — PROGRESS NOTES
Please offer appt in person for weight loss medication with labs prior to visit    Orders Placed This Encounter    TSH    Lipid Panel    Hemoglobin A1C    Comprehensive Metabolic Panel    CBC Auto Differential     Dr. Amy Molina D.O.   Family Medicine

## 2024-06-25 ENCOUNTER — LAB VISIT (OUTPATIENT)
Dept: LAB | Facility: HOSPITAL | Age: 29
End: 2024-06-25
Attending: FAMILY MEDICINE
Payer: COMMERCIAL

## 2024-06-25 DIAGNOSIS — Z00.01 ENCOUNTER FOR GENERAL ADULT MEDICAL EXAMINATION WITH ABNORMAL FINDINGS: ICD-10-CM

## 2024-06-25 LAB
ALBUMIN SERPL BCP-MCNC: 4.1 G/DL (ref 3.5–5.2)
ALP SERPL-CCNC: 88 U/L (ref 55–135)
ALT SERPL W/O P-5'-P-CCNC: 16 U/L (ref 10–44)
ANION GAP SERPL CALC-SCNC: 10 MMOL/L (ref 8–16)
AST SERPL-CCNC: 18 U/L (ref 10–40)
BASOPHILS # BLD AUTO: 0.07 K/UL (ref 0–0.2)
BASOPHILS NFR BLD: 0.8 % (ref 0–1.9)
BILIRUB SERPL-MCNC: 0.2 MG/DL (ref 0.1–1)
BUN SERPL-MCNC: 8 MG/DL (ref 6–20)
CALCIUM SERPL-MCNC: 10.2 MG/DL (ref 8.7–10.5)
CHLORIDE SERPL-SCNC: 105 MMOL/L (ref 95–110)
CHOLEST SERPL-MCNC: 216 MG/DL (ref 120–199)
CHOLEST/HDLC SERPL: 4.2 {RATIO} (ref 2–5)
CO2 SERPL-SCNC: 25 MMOL/L (ref 23–29)
CREAT SERPL-MCNC: 0.9 MG/DL (ref 0.5–1.4)
DIFFERENTIAL METHOD BLD: NORMAL
EOSINOPHIL # BLD AUTO: 0.1 K/UL (ref 0–0.5)
EOSINOPHIL NFR BLD: 1.1 % (ref 0–8)
ERYTHROCYTE [DISTWIDTH] IN BLOOD BY AUTOMATED COUNT: 13.1 % (ref 11.5–14.5)
EST. GFR  (NO RACE VARIABLE): >60 ML/MIN/1.73 M^2
ESTIMATED AVG GLUCOSE: 105 MG/DL (ref 68–131)
GLUCOSE SERPL-MCNC: 96 MG/DL (ref 70–110)
HBA1C MFR BLD: 5.3 % (ref 4–5.6)
HCT VFR BLD AUTO: 42 % (ref 37–48.5)
HDLC SERPL-MCNC: 52 MG/DL (ref 40–75)
HDLC SERPL: 24.1 % (ref 20–50)
HGB BLD-MCNC: 13.9 G/DL (ref 12–16)
IMM GRANULOCYTES # BLD AUTO: 0.02 K/UL (ref 0–0.04)
IMM GRANULOCYTES NFR BLD AUTO: 0.2 % (ref 0–0.5)
LDLC SERPL CALC-MCNC: 139.6 MG/DL (ref 63–159)
LYMPHOCYTES # BLD AUTO: 3 K/UL (ref 1–4.8)
LYMPHOCYTES NFR BLD: 32.6 % (ref 18–48)
MCH RBC QN AUTO: 29.4 PG (ref 27–31)
MCHC RBC AUTO-ENTMCNC: 33.1 G/DL (ref 32–36)
MCV RBC AUTO: 89 FL (ref 82–98)
MONOCYTES # BLD AUTO: 0.8 K/UL (ref 0.3–1)
MONOCYTES NFR BLD: 8.3 % (ref 4–15)
NEUTROPHILS # BLD AUTO: 5.2 K/UL (ref 1.8–7.7)
NEUTROPHILS NFR BLD: 57 % (ref 38–73)
NONHDLC SERPL-MCNC: 164 MG/DL
NRBC BLD-RTO: 0 /100 WBC
PLATELET # BLD AUTO: 405 K/UL (ref 150–450)
PMV BLD AUTO: 10.3 FL (ref 9.2–12.9)
POTASSIUM SERPL-SCNC: 4.1 MMOL/L (ref 3.5–5.1)
PROT SERPL-MCNC: 7.6 G/DL (ref 6–8.4)
RBC # BLD AUTO: 4.73 M/UL (ref 4–5.4)
SODIUM SERPL-SCNC: 140 MMOL/L (ref 136–145)
TRIGL SERPL-MCNC: 122 MG/DL (ref 30–150)
TSH SERPL DL<=0.005 MIU/L-ACNC: 1.29 UIU/ML (ref 0.4–4)
WBC # BLD AUTO: 9.14 K/UL (ref 3.9–12.7)

## 2024-06-25 PROCEDURE — 80053 COMPREHEN METABOLIC PANEL: CPT | Performed by: FAMILY MEDICINE

## 2024-06-25 PROCEDURE — 83036 HEMOGLOBIN GLYCOSYLATED A1C: CPT | Performed by: FAMILY MEDICINE

## 2024-06-25 PROCEDURE — 36415 COLL VENOUS BLD VENIPUNCTURE: CPT | Performed by: FAMILY MEDICINE

## 2024-06-25 PROCEDURE — 84443 ASSAY THYROID STIM HORMONE: CPT | Performed by: FAMILY MEDICINE

## 2024-06-25 PROCEDURE — 85025 COMPLETE CBC W/AUTO DIFF WBC: CPT | Performed by: FAMILY MEDICINE

## 2024-06-25 PROCEDURE — 80061 LIPID PANEL: CPT | Performed by: FAMILY MEDICINE

## 2024-06-26 ENCOUNTER — PATIENT MESSAGE (OUTPATIENT)
Dept: INTERNAL MEDICINE | Facility: CLINIC | Age: 29
End: 2024-06-26
Payer: COMMERCIAL

## 2024-06-26 PROBLEM — E66.9 OBESITY (BMI 30-39.9): Status: ACTIVE | Noted: 2024-06-26

## 2024-06-26 PROBLEM — Z30.2 REQUEST FOR STERILIZATION: Status: RESOLVED | Noted: 2022-10-17 | Resolved: 2024-06-26

## 2024-06-26 PROBLEM — S16.1XXA CERVICAL STRAIN: Status: RESOLVED | Noted: 2020-10-01 | Resolved: 2024-06-26

## 2024-06-26 PROBLEM — M25.639 DECREASED ROM OF WRIST: Status: RESOLVED | Noted: 2022-05-06 | Resolved: 2024-06-26

## 2024-06-26 PROBLEM — M25.531 WRIST PAIN, ACUTE, RIGHT: Status: RESOLVED | Noted: 2022-05-06 | Resolved: 2024-06-26

## 2024-06-26 PROBLEM — R29.898 WRIST WEAKNESS: Status: RESOLVED | Noted: 2022-05-06 | Resolved: 2024-06-26

## 2024-06-26 NOTE — PROGRESS NOTES
FAMILY MEDICINE  [facility]    Reason for visit:   Chief Complaint   Patient presents with    Annual Exam    Discuss weight loss medication         SUBJECTIVE: Thelma Roman is a 29 y.o. female  - with depression and anxiety presents for a physical and discuss weight loss medication      Gynecology Dr. Shivani Rodgers MD  Plastic Surgery Dr. Ethel Stock MD  Psychiatry Dr. Ledy Hensley    The patient's last visit with me was on 1/13/22. Thelma Roman reports overall that she is doing well.  She is currently working nights at [employer].  She has been  since her last visit with me.    1. BMI 33/.41    Today 06/27/2024:   She reports that she is concerned with her steady weight gain over the last 4 years.  Seemed to start during the COVID-19 pandemic.  She reports that she does not eat a large amount of food.  She does try to exercise regularly.  She went to a weight loss specialist and tried intermittent fasting, focusing on diet and exercise and a few medications that were not effective.  She is interested in starting a GLP 1 inhibitor.  She checked with her insurance they do cover Wegovy for weight loss.  She reports that she is read up on potential side effects.    Current weight:   Wt Readings from Last 1 Encounters:  06/27/24 : 102.7 kg (226 lb 4.8 oz)    Prior weight history:   Wt Readings from Last 30 Encounters:  06/27/24 : 102.7 kg (226 lb 4.8 oz)  04/26/23 : 97.5 kg (215 lb)  04/22/23 : 97.5 kg (215 lb)  11/18/22 : 100.2 kg (220 lb 12.8 oz)  11/09/22 : 97.5 kg (215 lb)  10/17/22 : 98.1 kg (216 lb 2.6 oz)  08/26/22 : 97.7 kg (215 lb 4.8 oz)  07/19/22 : 95.3 kg (210 lb)  05/18/22 : 95.3 kg (210 lb)  04/18/22 : 98.9 kg (218 lb)  01/10/22 : 98.4 kg (217 lb)  11/30/21 : 103 kg (227 lb)  10/29/21 : 103.1 kg (227 lb 6.5 oz)  09/30/21 : 104.3 kg (230 lb)  08/21/21 : 104.3 kg (230 lb)  05/28/21 : 104.4 kg (230 lb 2.6 oz)  05/04/21 : 104.3 kg (230 lb)  11/12/20 : 104.3 kg (230  lb)  10/30/20 : 107 kg (236 lb)  10/30/20 : 102.1 kg (225 lb)  10/01/20 : 104.3 kg (229 lb 14.4 oz)  09/28/20 : 104.3 kg (230 lb)  01/15/20 : 89.8 kg (198 lb)  10/29/19 : 89.8 kg (198 lb)  10/21/19 : 90.2 kg (198 lb 14.4 oz)  09/19/19 : 88.9 kg (195 lb 14.4 oz)  08/14/19 : 88.9 kg (196 lb)  08/07/19 : 88.3 kg (194 lb 9.6 oz)  07/30/19 : 81.6 kg (180 lb)  07/02/19 : 81.6 kg (180 lb)    Lowest weight: 145-150 lbs  Goal weight: 180 lbs     Current diet: standard  Current exercise: NA  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    Eyes:  Negative for discharge.   Respiratory:  Negative for wheezing.    Cardiovascular:  Negative for chest pain and palpitations.   Gastrointestinal:  Negative for blood in stool, constipation, diarrhea and vomiting.   Genitourinary:  Negative for dysuria and hematuria.   Musculoskeletal:  Negative for neck pain.   Neurological:  Negative for weakness and headaches.   Endo/Heme/Allergies:  Negative for polydipsia.   All other systems reviewed and are negative.      HEALTH MAINTENANCE:   Health Maintenance   Topic Date Due    Pap Smear  08/26/2025    TETANUS VACCINE  03/13/2029    Hepatitis C Screening  Completed    Lipid Panel  Completed     Health Maintenance Topics with due status: Not Due       Topic Last Completion Date    TETANUS VACCINE 03/13/2019    Pap Smear 08/26/2022    Influenza Vaccine 01/18/2023     Health Maintenance Due   Topic Date Due    Pneumococcal Vaccines (Age 0-64) (1 of 2 - PCV) Never done    COVID-19 Vaccine (3 - 2023-24 season) 09/01/2023       HISTORY:   Past Medical History:   Diagnosis Date    Acute appendicitis  10/30/2020    Anxiety     Chronic pain of right knee 11/18/2018    Depression     Panic disorder without agoraphobia 9/16/2015       Past Surgical History:   Procedure Laterality Date    APPENDECTOMY      CARPAL TUNNEL RELEASE      FACIAL RECONSTRUCTION SURGERY      LAPAROSCOPIC APPENDECTOMY N/A 10/31/2020    Procedure: APPENDECTOMY, LAPAROSCOPIC;  Surgeon: Steve Saucedo Jr., MD;  Location: [facility] OR;  Service: General;  Laterality: N/A;    LAPAROSCOPIC SALPINGECTOMY Bilateral 11/09/2022    Procedure: SALPINGECTOMY, LAPAROSCOPIC, REMOVAL OF NEXPLANON ;  Surgeon: Shivani Rodgers MD;  Location: [facility] OR;  Service: OB/GYN;  Laterality: Bilateral;    RADIOFREQUENCY ABLATION  04/2024    [place]    TONSILLECTOMY         Family History   Problem Relation Name Age of Onset    No Known Problems Mother      Breast cancer Neg Hx      Ovarian cancer Neg Hx      Thyroid cancer Neg Hx         Social History     Tobacco Use    Smoking status: Never     Passive exposure: Never    Smokeless tobacco: Never   Substance Use Topics    Alcohol use: Never    Drug use: Never       Social History     Social History Narrative    Lives with . Astranged from her mother. Graduated [employer], majored in psychology with several minors. 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 . Denies alcohol, smoking or illicit drugs. [employer].        ALLERGIES:   Review of patient's allergies indicates:   Allergen Reactions    Sulfamethoxazole-trimethoprim Hives, Itching and Rash       MEDS:   Current Outpatient Medications on File Prior to Visit   Medication Sig Dispense Refill Last Dose    buPROPion (WELLBUTRIN XL) 150 MG TB24 tablet Take 1 tablet (150 mg total) by mouth once daily. 30 tablet 1 Taking    cloNIDine (CATAPRES) 0.2 MG tablet Take 1 tablet (0.2 mg total) by mouth every evening. 30 tablet 1 Taking    venlafaxine (EFFEXOR-XR) 150 MG Cp24 Take 1 capsule (150 mg total) by mouth once daily. 30 capsule 1 Taking     "venlafaxine (EFFEXOR-XR) 75 MG 24 hr capsule Take 1 capsule (75 mg total) by mouth once daily. Take with 150mg tablet for a total of 225mg. 30 capsule 1 Taking         Vital signs:   Vitals:    06/27/24 1020   BP: 127/89   BP Location: Right arm   Patient Position: Sitting   BP Method: Large (Automatic)   Pulse: 100   SpO2: 98%   Weight: 102.7 kg (226 lb 4.8 oz)   Height: 5' 9" (1.753 m)     Body mass index is 33.42 kg/m².    PHYSICAL EXAM:     Physical Exam  Vitals reviewed.   Constitutional:       General: She is not in acute distress.  HENT:      Head: Normocephalic and atraumatic.      Right Ear: Tympanic membrane and ear canal normal.      Left Ear: Tympanic membrane and ear canal normal.   Eyes:      General: No scleral icterus.     Conjunctiva/sclera: Conjunctivae normal.   Neck:      Thyroid: No thyromegaly.      Vascular: No carotid bruit.   Cardiovascular:      Rate and Rhythm: Normal rate and regular rhythm.      Pulses: Normal pulses.      Heart sounds: Normal heart sounds. No murmur heard.     No friction rub. No gallop.   Pulmonary:      Effort: Pulmonary effort is normal.      Breath sounds: Normal breath sounds. No wheezing, rhonchi or rales.   Abdominal:      General: Bowel sounds are normal. There is no distension.      Palpations: Abdomen is soft.      Tenderness: There is no abdominal tenderness.   Musculoskeletal:      Cervical back: Normal range of motion and neck supple.      Right lower leg: No edema.      Left lower leg: No edema.   Lymphadenopathy:      Cervical: Cervical adenopathy (reactive; left posterior cervical that is soft, nontender mobile in about 0.5 cm) present.   Skin:     General: Skin is warm.      Capillary Refill: Capillary refill takes less than 2 seconds.   Neurological:      Mental Status: She is alert.             PERTINENT RESULTS:   Lab Visit on 06/25/2024   Component Date Value Ref Range Status    TSH 06/25/2024 1.295  0.400 - 4.000 uIU/mL Final    Cholesterol " 06/25/2024 216 (H)  120 - 199 mg/dL Final    Comment: The National Cholesterol Education Program (NCEP) has set the  following guidelines (reference ranges) for Cholesterol:  Optimal.....................<200 mg/dL  Borderline High.............200-239 mg/dL  High........................> or = 240 mg/dL      Triglycerides 06/25/2024 122  30 - 150 mg/dL Final    Comment: The National Cholesterol Education Program (NCEP) has set the  following guidelines (reference values) for triglycerides:  Normal......................<150 mg/dL  Borderline High.............150-199 mg/dL  High........................200-499 mg/dL      HDL 06/25/2024 52  40 - 75 mg/dL Final    Comment: The National Cholesterol Education Program (NCEP) has set the  following guidelines (reference values) for HDL Cholesterol:  Low...............<40 mg/dL  Optimal...........>60 mg/dL      LDL Cholesterol 06/25/2024 139.6  63.0 - 159.0 mg/dL Final    Comment: The National Cholesterol Education Program (NCEP) has set the  following guidelines (reference values) for LDL Cholesterol:  Optimal.......................<130 mg/dL  Borderline High...............130-159 mg/dL  High..........................160-189 mg/dL  Very High.....................>190 mg/dL      HDL/Cholesterol Ratio 06/25/2024 24.1  20.0 - 50.0 % Final    Total Cholesterol/HDL Ratio 06/25/2024 4.2  2.0 - 5.0 Final    Non-HDL Cholesterol 06/25/2024 164  mg/dL Final    Comment: Risk category and Non-HDL cholesterol goals:  Coronary heart disease (CHD)or equivalent (10-year risk of CHD >20%):  Non-HDL cholesterol goal     <130 mg/dL  Two or more CHD risk factors and 10-year risk of CHD <= 20%:  Non-HDL cholesterol goal     <160 mg/dL  0 to 1 CHD risk factor:  Non-HDL cholesterol goal     <190 mg/dL      Hemoglobin A1C 06/25/2024 5.3  4.0 - 5.6 % Final    Comment: ADA Screening Guidelines:  5.7-6.4%  Consistent with prediabetes  >or=6.5%  Consistent with diabetes    High levels of fetal hemoglobin  interfere with the HbA1C  assay. Heterozygous hemoglobin variants (HbS, HgC, etc)do  not significantly interfere with this assay.   However, presence of multiple variants may affect accuracy.      Estimated Avg Glucose 06/25/2024 105  68 - 131 mg/dL Final    Sodium 06/25/2024 140  136 - 145 mmol/L Final    Potassium 06/25/2024 4.1  3.5 - 5.1 mmol/L Final    Chloride 06/25/2024 105  95 - 110 mmol/L Final    CO2 06/25/2024 25  23 - 29 mmol/L Final    Glucose 06/25/2024 96  70 - 110 mg/dL Final    BUN 06/25/2024 8  6 - 20 mg/dL Final    Creatinine 06/25/2024 0.9  0.5 - 1.4 mg/dL Final    Calcium 06/25/2024 10.2  8.7 - 10.5 mg/dL Final    Total Protein 06/25/2024 7.6  6.0 - 8.4 g/dL Final    Albumin 06/25/2024 4.1  3.5 - 5.2 g/dL Final    Total Bilirubin 06/25/2024 0.2  0.1 - 1.0 mg/dL Final    Comment: For infants and newborns, interpretation of results should be based  on gestational age, weight and in agreement with clinical  observations.    Premature Infant recommended reference ranges:  Up to 24 hours.............<8.0 mg/dL  Up to 48 hours............<12.0 mg/dL  3-5 days..................<15.0 mg/dL  6-29 days.................<15.0 mg/dL      Alkaline Phosphatase 06/25/2024 88  55 - 135 U/L Final    AST 06/25/2024 18  10 - 40 U/L Final    ALT 06/25/2024 16  10 - 44 U/L Final    eGFR 06/25/2024 >60.0  >60 mL/min/1.73 m^2 Final    Anion Gap 06/25/2024 10  8 - 16 mmol/L Final    WBC 06/25/2024 9.14  3.90 - 12.70 K/uL Final    RBC 06/25/2024 4.73  4.00 - 5.40 M/uL Final    Hemoglobin 06/25/2024 13.9  12.0 - 16.0 g/dL Final    Hematocrit 06/25/2024 42.0  37.0 - 48.5 % Final    MCV 06/25/2024 89  82 - 98 fL Final    MCH 06/25/2024 29.4  27.0 - 31.0 pg Final    MCHC 06/25/2024 33.1  32.0 - 36.0 g/dL Final    RDW 06/25/2024 13.1  11.5 - 14.5 % Final    Platelets 06/25/2024 405  150 - 450 K/uL Final    MPV 06/25/2024 10.3  9.2 - 12.9 fL Final    Immature Granulocytes 06/25/2024 0.2  0.0 - 0.5 % Final    Gran # (ANC)  06/25/2024 5.2  1.8 - 7.7 K/uL Final    Immature Grans (Abs) 06/25/2024 0.02  0.00 - 0.04 K/uL Final    Comment: Mild elevation in immature granulocytes is non specific and   can be seen in a variety of conditions including stress response,   acute inflammation, trauma and pregnancy. Correlation with other   laboratory and clinical findings is essential.      Lymph # 06/25/2024 3.0  1.0 - 4.8 K/uL Final    Mono # 06/25/2024 0.8  0.3 - 1.0 K/uL Final    Eos # 06/25/2024 0.1  0.0 - 0.5 K/uL Final    Baso # 06/25/2024 0.07  0.00 - 0.20 K/uL Final    nRBC 06/25/2024 0  0 /100 WBC Final    Gran % 06/25/2024 57.0  38.0 - 73.0 % Final    Lymph % 06/25/2024 32.6  18.0 - 48.0 % Final    Mono % 06/25/2024 8.3  4.0 - 15.0 % Final    Eosinophil % 06/25/2024 1.1  0.0 - 8.0 % Final    Basophil % 06/25/2024 0.8  0.0 - 1.9 % Final    Differential Method 06/25/2024 Automated   Final     Final Pathologic Diagnosis Specimen Adequacy   Satisfactory for interpretation. Endocervical component is present.   Powell Category   Negative for intraepithelial lesion or malignancy.    Comment: Interp By Cris Gay, Signed on 09/02/2022 at 21:04   Disclaimer The Pap smear is a screening test that aids in the detection of cervical   cancer and cancer precursors. Both false positive and false negative results   can occur. The test should be used at regular intervals, and positive results   should be confirmed before definitive therapy.   Screening was performed at [facility], [address], [place] [address].    Resulting Agency [facility]              Narrative  Performed by: [facility]  [facility], screening   LMP 8/15/2022   routine      Specimen Collected: 08/26/22 13:16 CDT Last Resulted: 09/02/22 21:29 CDT              ASSESSMENT/PLAN:    1. Encounter for general adult medical examination with abnormal findings  Overview:  - preventative health counseling  - counseling on current  recommendations for cervical cancer screening. Up to date      2. Obesity (BMI 30-39.9)  Overview:  - discussed recommendation for diet and cardiovascular exercise  - counseling on lifestyle modifications for risk factor reduction  - counseling on management options  - opts for medication management and recommend start Wegovy 0.5 mg weekly and  prescribed Zofran as needed for any nausea  - Thelma Roman denies history of pancreatitis or heavy alcohol use and denies family and person history of thyroid medullary cancer and MENs  - counseling regarding new medications including expected results, potential side effects, and appropriate use.  - questions elicited and answered  - encouraged to patient to notify me of any questions or concerns  - discussed cost being a limiting issue for weight loss medication and directed to  website  - recommend add psyllium husk  - recommend diversify diet and goal of 3-4 colors of the rainbow in every meal and avoid UPF      Orders:  -     Discontinue: semaglutide, weight loss, (WEGOVY) 0.5 mg/0.5 mL PnIj; Inject 0.5 mg into the skin every 7 days.  Dispense: 2 mL; Refill: 0  -     Discontinue: ondansetron (ZOFRAN) 4 MG tablet; Take 1 tablet (4 mg total) by mouth every 6 (six) hours as needed for Nausea.  Dispense: 30 tablet; Refill: 0  -     ondansetron (ZOFRAN) 4 MG tablet; Take 1 tablet (4 mg total) by mouth every 6 (six) hours as needed for Nausea.  Dispense: 30 tablet; Refill: 0  -     semaglutide, weight loss, (WEGOVY) 0.5 mg/0.5 mL PnIj; Inject 0.5 mg into the skin every 7 days.  Dispense: 2 mL; Refill: 0          ORDERS:   Orders Placed This Encounter    ondansetron (ZOFRAN) 4 MG tablet    semaglutide, weight loss, (WEGOVY) 0.5 mg/0.5 mL PnIj       Vaccines recommended:  COVID-19 up-to-date    Follow up in about 1 month (around 7/27/2024) for Virtual Visit, Weight. or sooner with any concerns        This note is dictated using the M*Modal Fluency Direct word  recognition program. There are word recognition mistakes that are occasionally missed on review.    Dr. Amy Molina D.O.   [facility]

## 2024-06-27 ENCOUNTER — OFFICE VISIT (OUTPATIENT)
Dept: PRIMARY CARE CLINIC | Facility: CLINIC | Age: 29
End: 2024-06-27
Attending: FAMILY MEDICINE
Payer: COMMERCIAL

## 2024-06-27 ENCOUNTER — PATIENT MESSAGE (OUTPATIENT)
Dept: PRIMARY CARE CLINIC | Facility: CLINIC | Age: 29
End: 2024-06-27

## 2024-06-27 VITALS
WEIGHT: 226.31 LBS | BODY MASS INDEX: 33.52 KG/M2 | DIASTOLIC BLOOD PRESSURE: 89 MMHG | SYSTOLIC BLOOD PRESSURE: 127 MMHG | HEART RATE: 100 BPM | HEIGHT: 69 IN | OXYGEN SATURATION: 98 %

## 2024-06-27 DIAGNOSIS — E66.9 OBESITY (BMI 30-39.9): Primary | ICD-10-CM

## 2024-06-27 DIAGNOSIS — Z00.01 ENCOUNTER FOR GENERAL ADULT MEDICAL EXAMINATION WITH ABNORMAL FINDINGS: Primary | ICD-10-CM

## 2024-06-27 DIAGNOSIS — E66.9 OBESITY (BMI 30-39.9): ICD-10-CM

## 2024-06-27 PROCEDURE — 99999 PR PBB SHADOW E&M-EST. PATIENT-LVL III: CPT | Mod: PBBFAC,,, | Performed by: FAMILY MEDICINE

## 2024-06-27 RX ORDER — SEMAGLUTIDE 0.5 MG/.5ML
0.5 INJECTION, SOLUTION SUBCUTANEOUS
Qty: 2 ML | Refills: 0 | Status: SHIPPED | OUTPATIENT
Start: 2024-06-27 | End: 2024-07-27

## 2024-06-27 RX ORDER — SEMAGLUTIDE 0.5 MG/.5ML
0.5 INJECTION, SOLUTION SUBCUTANEOUS
Qty: 2 ML | Refills: 0 | Status: SHIPPED | OUTPATIENT
Start: 2024-06-27 | End: 2024-06-27

## 2024-06-27 RX ORDER — ONDANSETRON 4 MG/1
4 TABLET, FILM COATED ORAL EVERY 6 HOURS PRN
Qty: 30 TABLET | Refills: 0 | Status: SHIPPED | OUTPATIENT
Start: 2024-06-27

## 2024-06-27 RX ORDER — ONDANSETRON 4 MG/1
4 TABLET, FILM COATED ORAL EVERY 6 HOURS PRN
Qty: 30 TABLET | Refills: 0 | Status: SHIPPED | OUTPATIENT
Start: 2024-06-27 | End: 2024-06-27

## 2024-07-01 RX ORDER — SEMAGLUTIDE 0.5 MG/.5ML
INJECTION, SOLUTION SUBCUTANEOUS
Qty: 2 ML | Refills: 0 | Status: SHIPPED | OUTPATIENT
Start: 2024-07-01

## 2024-07-22 ENCOUNTER — TELEPHONE (OUTPATIENT)
Dept: PSYCHIATRY | Facility: CLINIC | Age: 29
End: 2024-07-22
Payer: COMMERCIAL

## 2024-07-22 ENCOUNTER — PATIENT MESSAGE (OUTPATIENT)
Dept: PSYCHIATRY | Facility: CLINIC | Age: 29
End: 2024-07-22
Payer: COMMERCIAL

## 2024-08-21 ENCOUNTER — E-VISIT (OUTPATIENT)
Dept: PRIMARY CARE CLINIC | Facility: CLINIC | Age: 29
End: 2024-08-21
Attending: FAMILY MEDICINE
Payer: COMMERCIAL

## 2024-08-21 DIAGNOSIS — T14.90XA: Primary | ICD-10-CM

## 2024-08-21 NOTE — PROGRESS NOTES
Patient ID: Thelma Roman is a 29 y.o. female.    Chief Complaint: General Illness (Entered automatically based on patient selection in [employer].)      The patient initiated a request through [employer] on 8/21/2024 for evaluation and management with a chief complaint of General Illness (Entered automatically based on patient selection in [employer].)     I evaluated the questionnaire submission on 8/21/24. I reviewed Thelma Roman 's medical history, allergies and current medications.     Patient Active Problem List   Diagnosis    Moderate episode of recurrent major depressive disorder    Generalized anxiety disorder    Tension headache    Encounter for general adult medical examination with abnormal findings    Herniation of intervertebral disc at C5-C6 level    Obesity (BMI 30-39.9)       Review of patient's allergies indicates:   Allergen Reactions    Sulfamethoxazole-trimethoprim Hives, Itching and Rash       [facility] Peq Evisit [facility]-Muscle,Back,Joint    8/21/2024  8:23 AM CDT - Filed by Patient   What do you need help with? Wrist Problem   Do you agree to participate in an E-Visit? Yes   If you have any of the following symptoms, please present to your local emergency room or call 911:  I acknowledge   Are you pregnant, could you be pregnant, or are you breast feeding? None of the above   What is the main issue you would like addressed today? Accidental injury to back of left wrist (stabbed with pointed tweezers but no blood)   Please describe your symptoms Pain when moving hand. Hand weakness   Where is your problem located? Left dorsal wrist   How severe are your symptoms? Moderate   Have you had these symptoms before? No   How long have you been having these symptoms? Just today   Please list any medications or treatments you have used for your condition and indicate if it was effective or not. Antipain cream/ ibuprofen/ ice   What makes this feel better? Not moving it at all   What makes this feel worse?  Bending wrist and any weight lifting   Are these symptoms related to a condition that you currently have? No   Please describe any probable cause for these symptoms Accidental puncture by tweezers   Provide any additional information you feel is important. It didnt bleed, the spot is very small but I did hear crunching when it happened, not sure if I should seek medical attention   Please attach any relevant images or files    Are you able to take your vital signs? No         1. Injury of skin and subcutaneous tissue      - See Sara Campbell/[facility] Message         Follow up if symptoms worsen or fail to improve.    E-Visit Time Tracking:    Day 1 Time (in minutes): 12    Total Time (in minutes): 12

## 2024-08-22 ENCOUNTER — OFFICE VISIT (OUTPATIENT)
Dept: PSYCHIATRY | Facility: CLINIC | Age: 29
End: 2024-08-22
Payer: COMMERCIAL

## 2024-08-22 DIAGNOSIS — F43.10 PTSD (POST-TRAUMATIC STRESS DISORDER): Primary | ICD-10-CM

## 2024-08-22 DIAGNOSIS — F41.0 GENERALIZED ANXIETY DISORDER WITH PANIC ATTACKS: ICD-10-CM

## 2024-08-22 DIAGNOSIS — F33.40 RECURRENT MAJOR DEPRESSIVE DISORDER, IN REMISSION: ICD-10-CM

## 2024-08-22 DIAGNOSIS — F41.1 GENERALIZED ANXIETY DISORDER WITH PANIC ATTACKS: ICD-10-CM

## 2024-08-22 PROBLEM — Z00.01 ENCOUNTER FOR GENERAL ADULT MEDICAL EXAMINATION WITH ABNORMAL FINDINGS: Status: RESOLVED | Noted: 2021-09-29 | Resolved: 2024-08-22

## 2024-08-22 PROCEDURE — 90791 PSYCH DIAGNOSTIC EVALUATION: CPT | Mod: 95,,, | Performed by: SOCIAL WORKER

## 2024-08-22 PROCEDURE — 3044F HG A1C LEVEL LT 7.0%: CPT | Mod: CPTII,95,, | Performed by: SOCIAL WORKER

## 2024-08-22 NOTE — PROGRESS NOTES
VIRTUAL/TELEMEDICINE VISIT   FAMILY MEDICINE  [facility]    The patient location is: [place]  The chief complaint leading to consultation is:   Chief Complaint   Patient presents with    Follow-up weight loss medication     Visit type: Virtual visit with synchronous audio and video   Total time spent: 30 minute  Each patient to whom he or she provides medical services by telemedicine is:  (1) informed of the relationship between the physician and patient and the respective role of any other health care provider with respect to management of the patient; and (2) notified that he or she may decline to receive medical services by telemedicine and may withdraw from such care at any time.    Reason for visit:   Chief Complaint   Patient presents with    Follow-up weight loss medication       SUBJECTIVE: Thelma Roman is a 29 y.o. female  - with depression and anxiety presents follow-up weight loss medication    Gynecology Dr. Shivani Rodgers MD  Plastic Surgery Dr. Ethel Stock MD  Psychiatry Dr. Ledy Hensley    1. Obesity BMI 33.23 starting 8/28/24    Today 8/28/24: Thelma Roman reports that you spoke with her insurance company and they do cover weight loss medication.  She never started compounding semaglutide 0.25 mg weekly.  She wants to focus on diet and exercise.  She would like to start Wegovy 0.5 mg weekly.    Prior note  06/27/2024:   She reports that she is concerned with her steady weight gain over the last 4 years.  Seemed to start during the COVID-19 pandemic.  She reports that she does not eat a large amount of food.  She does try to exercise regularly.  She went to a weight loss specialist and tried intermittent fasting, focusing on diet and exercise and a few medications that were not effective.  She is interested in starting a GLP 1 inhibitor.  She checked with her insurance they do cover Wegovy for weight loss.  She reports that she is read up on potential side  effects.    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    Eyes:  Negative for discharge.   Respiratory:  Negative for wheezing.    Cardiovascular:  Negative for chest pain and palpitations.   Gastrointestinal:  Negative for blood in stool, constipation, diarrhea and vomiting.   Genitourinary:  Negative for dysuria and hematuria.   Musculoskeletal:  Negative for neck pain.   Neurological:  Negative for weakness and headaches.   Endo/Heme/Allergies:  Negative for polydipsia.   All other systems reviewed and are negative.        HISTORY:   Past Medical History:   Diagnosis Date    Acute appendicitis 10/30/2020    Anxiety     Chronic pain of right knee 11/18/2018    Depression     Panic disorder without agoraphobia 9/16/2015       Past Surgical History:   Procedure Laterality Date    APPENDECTOMY      CARPAL TUNNEL RELEASE      FACIAL RECONSTRUCTION SURGERY      LAPAROSCOPIC APPENDECTOMY N/A 10/31/2020    Procedure: APPENDECTOMY, LAPAROSCOPIC;   "Surgeon: Steve Saucedo Jr., MD;  Location: [facility];  Service: General;  Laterality: N/A;    LAPAROSCOPIC SALPINGECTOMY Bilateral 11/09/2022    Procedure: SALPINGECTOMY, LAPAROSCOPIC, REMOVAL OF NEXPLANON ;  Surgeon: Shivani Rodgers MD;  Location: [facility] OR;  Service: OB/GYN;  Laterality: Bilateral;    RADIOFREQUENCY ABLATION  04/2024    [place]    TONSILLECTOMY         Family History   Problem Relation Name Age of Onset    No Known Problems Mother      Breast cancer Neg Hx      Ovarian cancer Neg Hx      Thyroid cancer Neg Hx         Social History     Tobacco Use    Smoking status: Never     Passive exposure: Never    Smokeless tobacco: Never   Substance Use Topics    Alcohol use: Never    Drug use: Never       Social History     Social History Narrative    Lives with . Astranged from her mother. Graduated [employer], majored in psychology with several minors. 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 . Denies alcohol, smoking or illicit drugs. WSM.        ALLERGIES:   Review of patient's allergies indicates:   Allergen Reactions    Sulfamethoxazole-trimethoprim Hives, Itching and Rash       MEDS:   Current Outpatient Medications on File Prior to Visit   Medication Sig Dispense Refill Last Dose    buPROPion (WELLBUTRIN XL) 150 MG TB24 tablet Take 1 tablet (150 mg total) by mouth once daily. 30 tablet 1     cloNIDine (CATAPRES) 0.2 MG tablet Take 1 tablet (0.2 mg total) by mouth every evening. 30 tablet 1     venlafaxine (EFFEXOR-XR) 75 MG 24 hr capsule Take 1 capsule (75 mg total) by mouth once daily. Take with 150mg tablet for a total of 225mg. 30 capsule 1        Vital signs:   Vitals:    08/28/24 0805   Weight: 102.1 kg (225 lb)   Height: 5' 9" (1.753 m)     Body mass index is 33.23 kg/m².    PHYSICAL EXAM:     Physical Exam  Constitutional:       General: She is not in acute distress.  Pulmonary:      Effort: Pulmonary effort is normal. No respiratory distress.   Neurological: "      Mental Status: She is alert.   Psychiatric:         Speech: Speech normal.           PERTINENT RESULTS:   No visits with results within 1 Week(s) from this visit.   Latest known visit with results is:   Lab Visit on 06/25/2024   Component Date Value Ref Range Status    TSH 06/25/2024 1.295  0.400 - 4.000 uIU/mL Final    Cholesterol 06/25/2024 216 (H)  120 - 199 mg/dL Final    Comment: The National Cholesterol Education Program (NCEP) has set the  following guidelines (reference ranges) for Cholesterol:  Optimal.....................<200 mg/dL  Borderline High.............200-239 mg/dL  High........................> or = 240 mg/dL      Triglycerides 06/25/2024 122  30 - 150 mg/dL Final    Comment: The National Cholesterol Education Program (NCEP) has set the  following guidelines (reference values) for triglycerides:  Normal......................<150 mg/dL  Borderline High.............150-199 mg/dL  High........................200-499 mg/dL      HDL 06/25/2024 52  40 - 75 mg/dL Final    Comment: The National Cholesterol Education Program (NCEP) has set the  following guidelines (reference values) for HDL Cholesterol:  Low...............<40 mg/dL  Optimal...........>60 mg/dL      LDL Cholesterol 06/25/2024 139.6  63.0 - 159.0 mg/dL Final    Comment: The National Cholesterol Education Program (NCEP) has set the  following guidelines (reference values) for LDL Cholesterol:  Optimal.......................<130 mg/dL  Borderline High...............130-159 mg/dL  High..........................160-189 mg/dL  Very High.....................>190 mg/dL      HDL/Cholesterol Ratio 06/25/2024 24.1  20.0 - 50.0 % Final    Total Cholesterol/HDL Ratio 06/25/2024 4.2  2.0 - 5.0 Final    Non-HDL Cholesterol 06/25/2024 164  mg/dL Final    Comment: Risk category and Non-HDL cholesterol goals:  Coronary heart disease (CHD)or equivalent (10-year risk of CHD >20%):  Non-HDL cholesterol goal     <130 mg/dL  Two or more CHD risk factors  and 10-year risk of CHD <= 20%:  Non-HDL cholesterol goal     <160 mg/dL  0 to 1 CHD risk factor:  Non-HDL cholesterol goal     <190 mg/dL      Hemoglobin A1C 06/25/2024 5.3  4.0 - 5.6 % Final    Comment: ADA Screening Guidelines:  5.7-6.4%  Consistent with prediabetes  >or=6.5%  Consistent with diabetes    High levels of fetal hemoglobin interfere with the HbA1C  assay. Heterozygous hemoglobin variants (HbS, HgC, etc)do  not significantly interfere with this assay.   However, presence of multiple variants may affect accuracy.      Estimated Avg Glucose 06/25/2024 105  68 - 131 mg/dL Final    Sodium 06/25/2024 140  136 - 145 mmol/L Final    Potassium 06/25/2024 4.1  3.5 - 5.1 mmol/L Final    Chloride 06/25/2024 105  95 - 110 mmol/L Final    CO2 06/25/2024 25  23 - 29 mmol/L Final    Glucose 06/25/2024 96  70 - 110 mg/dL Final    BUN 06/25/2024 8  6 - 20 mg/dL Final    Creatinine 06/25/2024 0.9  0.5 - 1.4 mg/dL Final    Calcium 06/25/2024 10.2  8.7 - 10.5 mg/dL Final    Total Protein 06/25/2024 7.6  6.0 - 8.4 g/dL Final    Albumin 06/25/2024 4.1  3.5 - 5.2 g/dL Final    Total Bilirubin 06/25/2024 0.2  0.1 - 1.0 mg/dL Final    Comment: For infants and newborns, interpretation of results should be based  on gestational age, weight and in agreement with clinical  observations.    Premature Infant recommended reference ranges:  Up to 24 hours.............<8.0 mg/dL  Up to 48 hours............<12.0 mg/dL  3-5 days..................<15.0 mg/dL  6-29 days.................<15.0 mg/dL      Alkaline Phosphatase 06/25/2024 88  55 - 135 U/L Final    AST 06/25/2024 18  10 - 40 U/L Final    ALT 06/25/2024 16  10 - 44 U/L Final    eGFR 06/25/2024 >60.0  >60 mL/min/1.73 m^2 Final    Anion Gap 06/25/2024 10  8 - 16 mmol/L Final    WBC 06/25/2024 9.14  3.90 - 12.70 K/uL Final    RBC 06/25/2024 4.73  4.00 - 5.40 M/uL Final    Hemoglobin 06/25/2024 13.9  12.0 - 16.0 g/dL Final    Hematocrit 06/25/2024 42.0  37.0 - 48.5 % Final    MCV  06/25/2024 89  82 - 98 fL Final    MCH 06/25/2024 29.4  27.0 - 31.0 pg Final    MCHC 06/25/2024 33.1  32.0 - 36.0 g/dL Final    RDW 06/25/2024 13.1  11.5 - 14.5 % Final    Platelets 06/25/2024 405  150 - 450 K/uL Final    MPV 06/25/2024 10.3  9.2 - 12.9 fL Final    Immature Granulocytes 06/25/2024 0.2  0.0 - 0.5 % Final    Gran # (ANC) 06/25/2024 5.2  1.8 - 7.7 K/uL Final    Immature Grans (Abs) 06/25/2024 0.02  0.00 - 0.04 K/uL Final    Comment: Mild elevation in immature granulocytes is non specific and   can be seen in a variety of conditions including stress response,   acute inflammation, trauma and pregnancy. Correlation with other   laboratory and clinical findings is essential.      Lymph # 06/25/2024 3.0  1.0 - 4.8 K/uL Final    Mono # 06/25/2024 0.8  0.3 - 1.0 K/uL Final    Eos # 06/25/2024 0.1  0.0 - 0.5 K/uL Final    Baso # 06/25/2024 0.07  0.00 - 0.20 K/uL Final    nRBC 06/25/2024 0  0 /100 WBC Final    Gran % 06/25/2024 57.0  38.0 - 73.0 % Final    Lymph % 06/25/2024 32.6  18.0 - 48.0 % Final    Mono % 06/25/2024 8.3  4.0 - 15.0 % Final    Eosinophil % 06/25/2024 1.1  0.0 - 8.0 % Final    Basophil % 06/25/2024 0.8  0.0 - 1.9 % Final    Differential Method 06/25/2024 Automated   Final       ASSESSMENT/PLAN:    1. Obesity (BMI 30-39.9)  Overview:  - Thelma Roman denies history of pancreatitis or heavy alcohol use and denies family and person history of thyroid medullary cancer and MENs  - discussed recommendation for diet and cardiovascular exercise  - counseling on lifestyle modifications for risk factor reduction  - discussed cost being a limiting issue for weight loss medication and directed to  website  - recommend add psyllium husk fiber supplement.  I recommend starting with once a day and increase as tolerated.  Recommend good water intake  - recommend diversify diet and goal of 3-4 colors of the rainbow in every meal and avoid UPF  - okay for ondansetron as needed for nausea  -  starting weight 08/28/2024 225 lbs  - recommend start Wegovy 0.5 mg weekly  - counseling regarding new medications including expected results, potential side effects, and appropriate use.  - questions elicited and answered  - encouraged to patient to notify me of any questions or concerns    Orders:  -     semaglutide, weight loss, (WEGOVY) 0.5 mg/0.5 mL PnIj; Inject 0.5 mg into the skin every 7 days.  Dispense: 2 mL; Refill: 0  -     ondansetron (ZOFRAN) 4 MG tablet; Take 1 tablet (4 mg total) by mouth every 6 (six) hours as needed for Nausea.  Dispense: 30 tablet; Refill: 0              ORDERS:   Orders Placed This Encounter    semaglutide, weight loss, (WEGOVY) 0.5 mg/0.5 mL PnIj    ondansetron (ZOFRAN) 4 MG tablet       Vaccines recommended:  Prevnar COVID-19    Follow up in about 1 month (around 9/28/2024) for Virtual Visit, Weight. or sooner with any concerns      This note is dictated using the M*Modal Fluency Direct word recognition program. There are word recognition mistakes that are occasionally missed on review.    Dr. Amy Molina D.O.   Family Medicine
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
## 2024-08-28 ENCOUNTER — OFFICE VISIT (OUTPATIENT)
Dept: PRIMARY CARE CLINIC | Facility: CLINIC | Age: 29
End: 2024-08-28
Attending: FAMILY MEDICINE
Payer: COMMERCIAL

## 2024-08-28 ENCOUNTER — TELEPHONE (OUTPATIENT)
Dept: PRIMARY CARE CLINIC | Facility: CLINIC | Age: 29
End: 2024-08-28

## 2024-08-28 ENCOUNTER — PATIENT MESSAGE (OUTPATIENT)
Dept: PSYCHIATRY | Facility: CLINIC | Age: 29
End: 2024-08-28
Payer: OTHER MISCELLANEOUS

## 2024-08-28 VITALS — BODY MASS INDEX: 33.33 KG/M2 | HEIGHT: 69 IN | WEIGHT: 225 LBS

## 2024-08-28 DIAGNOSIS — E66.9 OBESITY (BMI 30-39.9): Primary | ICD-10-CM

## 2024-08-28 PROCEDURE — 1159F MED LIST DOCD IN RCRD: CPT | Mod: CPTII,95,, | Performed by: FAMILY MEDICINE

## 2024-08-28 PROCEDURE — 99214 OFFICE O/P EST MOD 30 MIN: CPT | Mod: 95,,, | Performed by: FAMILY MEDICINE

## 2024-08-28 PROCEDURE — 1160F RVW MEDS BY RX/DR IN RCRD: CPT | Mod: CPTII,95,, | Performed by: FAMILY MEDICINE

## 2024-08-28 PROCEDURE — 3044F HG A1C LEVEL LT 7.0%: CPT | Mod: CPTII,95,, | Performed by: FAMILY MEDICINE

## 2024-08-28 PROCEDURE — 3008F BODY MASS INDEX DOCD: CPT | Mod: CPTII,95,, | Performed by: FAMILY MEDICINE

## 2024-08-28 RX ORDER — SEMAGLUTIDE 0.5 MG/.5ML
0.5 INJECTION, SOLUTION SUBCUTANEOUS
Qty: 2 ML | Refills: 0 | Status: SHIPPED | OUTPATIENT
Start: 2024-08-28 | End: 2024-09-27

## 2024-08-28 RX ORDER — ONDANSETRON 4 MG/1
4 TABLET, FILM COATED ORAL EVERY 6 HOURS PRN
Qty: 30 TABLET | Refills: 0 | Status: SHIPPED | OUTPATIENT
Start: 2024-08-28

## 2024-08-28 NOTE — TELEPHONE ENCOUNTER
----- Message from Amy Molina DO sent at 8/28/2024  8:03 AM CDT -----  Please schedule 1 month follow-up for weight.  Virtual visit

## 2024-08-29 ENCOUNTER — OFFICE VISIT (OUTPATIENT)
Dept: PSYCHIATRY | Facility: CLINIC | Age: 29
End: 2024-08-29
Payer: COMMERCIAL

## 2024-08-29 ENCOUNTER — PATIENT MESSAGE (OUTPATIENT)
Dept: PSYCHIATRY | Facility: CLINIC | Age: 29
End: 2024-08-29
Payer: OTHER MISCELLANEOUS

## 2024-08-29 VITALS
DIASTOLIC BLOOD PRESSURE: 93 MMHG | HEART RATE: 123 BPM | WEIGHT: 225 LBS | SYSTOLIC BLOOD PRESSURE: 137 MMHG | BODY MASS INDEX: 33.22 KG/M2

## 2024-08-29 DIAGNOSIS — F43.10 PTSD (POST-TRAUMATIC STRESS DISORDER): ICD-10-CM

## 2024-08-29 DIAGNOSIS — F41.0 GENERALIZED ANXIETY DISORDER WITH PANIC ATTACKS: Primary | ICD-10-CM

## 2024-08-29 DIAGNOSIS — F41.1 GENERALIZED ANXIETY DISORDER WITH PANIC ATTACKS: Primary | ICD-10-CM

## 2024-08-29 PROCEDURE — 99999 PR PBB SHADOW E&M-EST. PATIENT-LVL III: CPT | Mod: PBBFAC,,,

## 2024-08-29 RX ORDER — PRAZOSIN HYDROCHLORIDE 1 MG/1
1 CAPSULE ORAL NIGHTLY
Qty: 30 CAPSULE | Refills: 1 | Status: SHIPPED | OUTPATIENT
Start: 2024-08-29 | End: 2024-10-28

## 2024-08-29 RX ORDER — BUPROPION HYDROCHLORIDE 150 MG/1
150 TABLET ORAL DAILY
Qty: 30 TABLET | Refills: 1 | Status: SHIPPED | OUTPATIENT
Start: 2024-08-29 | End: 2024-10-28

## 2024-08-29 RX ORDER — VENLAFAXINE HYDROCHLORIDE 75 MG/1
75 CAPSULE, EXTENDED RELEASE ORAL DAILY
Qty: 30 CAPSULE | Refills: 1 | Status: SHIPPED | OUTPATIENT
Start: 2024-08-29 | End: 2024-10-28

## 2024-08-29 RX ORDER — VENLAFAXINE HYDROCHLORIDE 150 MG/1
150 CAPSULE, EXTENDED RELEASE ORAL DAILY
Qty: 30 CAPSULE | Refills: 1 | Status: SHIPPED | OUTPATIENT
Start: 2024-08-29 | End: 2024-10-28

## 2024-08-29 NOTE — PROGRESS NOTES
"  OUTPATIENT PSYCHIATRY FOLLOW UP VISIT    ENCOUNTER DATE:  8/29/2024  SITE:  [facility]  LENGTH OF SESSION:  68 minutes      CHIEF COMPLAINT:  Anxiety, PTSD, depression       HISTORY OF PRESENTING ILLNESS:  Thelma Roman is a 29 y.o. female with history of HEMA, PTSD, MDD in remission, who presents for follow up appointment.      Plan at last appointment with Dr Hensley on 6/20/24:  Decreased bupropion XL to 150 mg daily due to worsening panic attacks/anxiety-refilled today.  Discussed risk of lowering seizure threshold.  Wants to continue medication, reports seizure during surgery was "anesthesia-induced."   Continue venlafaxine XR to 225mg  daily-refilled today to better address anxiety/panic attacks  Discussed NAC for skin picking  Start Propranolol 20mg daily PRN for panic attacks  Increase Clonidine 0.2 mg PO nightly for tics/hyperarousal in PTSD  Cautioned that Propanolol and Clonidine will lower blood pressure   Referred to [facility] for therapy    reviewed.     Interval history as told by Patient - & or family/friend/spouse/caregiver with pts permission    Pt presents to clinic for follow up and to establish with new resident. Reports current med combo of wellbutrin, effexor, clonidine as worked well for her, though states her anxiety is still present. States she feels functional and stable, but anxiety is still very high. Denies recent panic attacks. Reports feeling emotionally dull currently. Still catastrophizing to extremes things that may happen but is aware are very unlikely to happen. Reports significant symptoms of PTSD from chronic trauma in childhood. Clonidine has helped her sleep but does not affect her nightmares. Depression is primarily secondary to her anger and needing to control/hold it in, characterized by feelings of failure, wasted potential, high pressure on self. Pt unsure if Effexor has been helpful for anxiety, but notes it has not made her feel " badly, as other antidepressants have. Pt's situation is stable and supportive - , has a home, has a decent job, 1 year from finishing [employer] degree, about to start trauma focused therapy via [facility]. Hx fine [employer] degree, gifted student at art, still doing digital art. Discussed pt's physical tic and skin picking, possibility of addressing with medication.       PSYCHIATRIC REVIEW OF SYSTEMS:(none/ yes- better/worse/stable/& what symptoms)    Symptoms of Depression: +guilt, worthlessness, anhedonia, low mood    Symptoms of Anxiety/ panic attacks: +generalized anxiety, restlessness, fatigue, difficult to control, difficulty relaxing, muscle tension.    Symptoms of Kathy/Hypomania: hx 2-3 days at a time of low need for sleep with some increased irritability; denies goal directed behavior, inpulsivity, pleasure seeking,    Symptoms of psychosis: endorses VH shadows at times, worse without sleep. Denies AH.     Sleep: overall good, works nights, uses melatonin for sleep when needed. Has nightmares from PTSD.     Appetite: low, but pt states she has always had a low appetite.     Other: PTSD - avoidance, re-experiencing, negative cognition, dissociation, hyperarousal/hypervigilance     Alcohol: denies    Illicit Drugs: denies    Psychosocial stressors: ongoing trauma response, finishing [employer] degree and looking for next job     Risk Parameters:  Patient reports no suicidal ideation  Patient reports no homicidal ideation  Patient reports no self-injurious behavior  Patient reports no violent behavior    PSYCHIATRIC MED REVIEW    Current psych meds  Medication side effects:  denies  Medication compliance:  adherent    Previous psych meds trials  Zoloft  Prozac  Lexapro   Klonopin  Propranolol     PAST PSYCHIATRIC, MEDICAL, AND SOCIAL HISTORY REVIEWED  The patient's past medical, family and social history have been reviewed and updated as appropriate within the electronic medical record - see encounter notes.    MEDICAL  REVIEW OF SYSTEMS:  Complete review of systems performed covering Constitutional, Musculoskeletal, Neurologic.  All systems negative.     ALL MEDICATIONS:    Current Outpatient Medications:     buPROPion (WELLBUTRIN XL) 150 MG TB24 tablet, Take 1 tablet (150 mg total) by mouth once daily., Disp: 30 tablet, Rfl: 1    cloNIDine (CATAPRES) 0.2 MG tablet, Take 1 tablet (0.2 mg total) by mouth every evening., Disp: 30 tablet, Rfl: 1    ondansetron (ZOFRAN) 4 MG tablet, Take 1 tablet (4 mg total) by mouth every 6 (six) hours as needed for Nausea., Disp: 30 tablet, Rfl: 0    semaglutide, weight loss, (WEGOVY) 0.5 mg/0.5 mL PnIj, Inject 0.5 mg into the skin every 7 days., Disp: 2 mL, Rfl: 0    venlafaxine (EFFEXOR-XR) 75 MG 24 hr capsule, Take 1 capsule (75 mg total) by mouth once daily. Take with 150mg tablet for a total of 225mg., Disp: 30 capsule, Rfl: 1    ALLERGIES:  Review of patient's allergies indicates:   Allergen Reactions    Sulfamethoxazole-trimethoprim Hives, Itching and Rash       RELEVANT LABS/STUDIES:    Lab Results   Component Value Date    WBC 9.14 06/25/2024    HGB 13.9 06/25/2024    HCT 42.0 06/25/2024    MCV 89 06/25/2024     06/25/2024     BMP  Lab Results   Component Value Date     06/25/2024    K 4.1 06/25/2024     06/25/2024    CO2 25 06/25/2024    BUN 8 06/25/2024    CREATININE 0.9 06/25/2024    CALCIUM 10.2 06/25/2024    ANIONGAP 10 06/25/2024    ESTGFRAFRICA >60 05/18/2022    EGFRNONAA >60 05/18/2022     Lab Results   Component Value Date    ALT 16 06/25/2024    AST 18 06/25/2024    ALKPHOS 88 06/25/2024    BILITOT 0.2 06/25/2024     Lab Results   Component Value Date    TSH 1.295 06/25/2024     Lab Results   Component Value Date    HGBA1C 5.3 06/25/2024       VITALS  Vitals:    08/29/24 0946   BP: (!) 137/93   Pulse: (!) 123   Weight: 102 kg (224 lb 15.7 oz)       PHYSICAL EXAM  General: well developed, well nourished  Neurologic:   Gait: Normal   Psychomotor signs:  No  "involuntary movements or tremor  AIMS: none    PSYCHIATRIC EXAM:     Mental Status Exam:  Appearance: unremarkable, age appropriate  Behavior/Cooperation: normal, cooperative  Speech: normal tone, normal rate, normal pitch, normal volume  Language: uses words appropriately; NO aphasia or dysarthria  Mood:  "tired"  Affect: constricted  Thought Process: normal and logical  Thought Content: normal, no suicidality, no homicidality, delusions, or paranoia  Level of Consciousness: Alert and Oriented x3  Memory:  Intact  Attention/concentration: appropriate for age/education.   Fund of Knowledge: appears adequate  Insight:  Intact  Judgment: Intact       IMPRESSION:    Thelma Roman is a 29 y.o. female with history of HEMA with panic attacks, PTSD, MDD, who presents for follow up appointment for medication management and to establish with new resident. Feeling okay on current med regimen, but not adequately controlled, anxiety still elevated. Tolerated Clonidine well but not helpful with nightmares. Discussed Prazosin as an alternative and pt interested in trial. Advised on risks, benefits, adverse effects. Discussed looking into options to manage skin picking, tic if desired.     Status/Progress:  Based on the examination today, the patient's problem(s) is/are inadequately controlled.  New problems have not been presented today.     DIAGNOSES:    ICD-10-CM ICD-9-CM   1. Generalized anxiety disorder with panic attacks  F41.1 300.02    F41.0 300.01   2. PTSD (post-traumatic stress disorder)  F43.10 309.81       PLAN:  Psych Med:  Continue meds:   Wellbutrin XL 150mg daily   Effexor XR 225mg daily   STOP Clonidine 0.2mg nightly  START Prazosin 1mg nightly for at least 2 weeks, able to increase to 2mg nightly as tolerated. Advised to reach out with any questions, concerns, adverse effects.     Discussed with patient informed consent, risks vs. benefits, alternative treatments, side effect profile and the inherent " unpredictability of individual responses to these treatments. Answered any questions patient may have had. The patient expresses understanding of the above and displays the capacity to agree with this current plan     Other: none at this time.     RETURN TO CLINIC:  1 month or sooner as needed       Kyrie Dubose MD  [facility] PGY-III   8/29/2024 9:57 AM      A total of 68 minutes was spent today on this encounter, including chart review,  review, seeing patient, documenting encounter, and ordering medications.

## 2024-09-04 ENCOUNTER — PATIENT MESSAGE (OUTPATIENT)
Dept: PRIMARY CARE CLINIC | Facility: CLINIC | Age: 29
End: 2024-09-04
Payer: OTHER MISCELLANEOUS

## 2024-09-04 DIAGNOSIS — E66.9 OBESITY (BMI 30-39.9): ICD-10-CM

## 2024-09-09 RX ORDER — SEMAGLUTIDE 0.5 MG/.5ML
INJECTION, SOLUTION SUBCUTANEOUS
Qty: 2 ML | Refills: 0 | Status: SHIPPED | OUTPATIENT
Start: 2024-09-09

## 2024-09-10 ENCOUNTER — OFFICE VISIT (OUTPATIENT)
Dept: PSYCHIATRY | Facility: CLINIC | Age: 29
End: 2024-09-10
Payer: COMMERCIAL

## 2024-09-10 DIAGNOSIS — F43.10 PTSD (POST-TRAUMATIC STRESS DISORDER): Primary | ICD-10-CM

## 2024-09-10 PROCEDURE — 90791 PSYCH DIAGNOSTIC EVALUATION: CPT | Mod: 95,,,
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
## 2024-09-12 ENCOUNTER — PATIENT MESSAGE (OUTPATIENT)
Dept: PSYCHIATRY | Facility: CLINIC | Age: 29
End: 2024-09-12
Payer: COMMERCIAL

## 2024-09-17 ENCOUNTER — OFFICE VISIT (OUTPATIENT)
Dept: PSYCHIATRY | Facility: CLINIC | Age: 29
End: 2024-09-17
Payer: COMMERCIAL

## 2024-09-17 DIAGNOSIS — F43.10 PTSD (POST-TRAUMATIC STRESS DISORDER): Primary | ICD-10-CM

## 2024-09-17 PROCEDURE — 90837 PSYTX W PT 60 MINUTES: CPT | Mod: 95,,,

## 2024-09-17 NOTE — PROGRESS NOTES
Individual Psychotherapy (PhD/LCSW)    9/17/2024    Site:  Telemed       The patient location is: [place]  The chief complaint leading to consultation is: PTSD    Visit type: audiovisual    Face to Face time with patient: 53  60 minutes of total time spent on the encounter, which includes face to face time and non-face to face time preparing to see the patient (eg, review of tests), Obtaining and/or reviewing separately obtained history, Documenting clinical information in the electronic or other health record, Independently interpreting results (not separately reported) and communicating results to the patient/family/caregiver, or Care coordination (not separately reported).     Each patient to whom he or she provides medical services by telemedicine is:  (1) informed of the relationship between the physician and patient and the respective role of any other health care provider with respect to management of the patient; and (2) notified that he or she may decline to receive medical services by telemedicine and may withdraw from such care at any time.    Notes:     Therapeutic Intervention: Met with patient.  Outpatient - Behavior modifying psychotherapy 60 min - CPT code 62430    Chief complaint/reason for encounter: PTSD     Interval history and content of current session:   Cognitive Processing Therapy (CPT), Session #1  Overview of PTSD and CPT  PCL-5:  30    Session Focus:  Introduced CPT (PTSD, stuck points, emotions)  Introduced Stuck Point log     Practice Assignment:  1) Stuck point log - Add to the stuck point log as needed  2) Impact statement - Write a short, one-page essay describing the reasons the trauma happened, and the consequences of trauma in terms of beliefs about yourself, others, and the world.  Also write about how the trauma impacted each of the CPT themes (Safety, Trust, Power/Control, Esteem, Intimacy) for yourself and others.    Treatment plan:  Target symptoms:  PTSD  Why chosen  therapy is appropriate versus another modality: evidence based practice  Outcome monitoring methods: self-report, observation, feedback from family, checklist/rating scale  Therapeutic intervention type: behavior modifying psychotherapy    Risk parameters:  Patient reports no suicidal ideation  Patient reports no homicidal ideation  Patient reports no self-injurious behavior  Patient reports no violent behavior    Verbal deficits: None    Patient's response to intervention:  The patient's response to intervention is accepting.    Progress toward goals and other mental status changes:  The patient's progress toward goals is fair .    Diagnosis:     ICD-10-CM ICD-9-CM   1. PTSD (post-traumatic stress disorder)  F43.10 309.81     Plan:  individual psychotherapy    Return to clinic: 1 week    Length of Service (minutes): 60

## 2024-09-24 ENCOUNTER — PATIENT MESSAGE (OUTPATIENT)
Dept: PSYCHIATRY | Facility: CLINIC | Age: 29
End: 2024-09-24

## 2024-09-25 ENCOUNTER — OFFICE VISIT (OUTPATIENT)
Dept: PSYCHIATRY | Facility: CLINIC | Age: 29
End: 2024-09-25
Payer: COMMERCIAL

## 2024-09-25 DIAGNOSIS — F41.1 GENERALIZED ANXIETY DISORDER WITH PANIC ATTACKS: ICD-10-CM

## 2024-09-25 DIAGNOSIS — F43.10 PTSD (POST-TRAUMATIC STRESS DISORDER): Primary | ICD-10-CM

## 2024-09-25 DIAGNOSIS — F41.0 GENERALIZED ANXIETY DISORDER WITH PANIC ATTACKS: ICD-10-CM

## 2024-09-25 NOTE — PROGRESS NOTES
Individual Psychotherapy (PhD/LCSW)    9/25/2024    Site:  Telemed       The patient location is: [place]  The chief complaint leading to consultation is: PTSD    Visit type: audiovisual    Face to Face time with patient: 61  60 minutes of total time spent on the encounter, which includes face to face time and non-face to face time preparing to see the patient (eg, review of tests), Obtaining and/or reviewing separately obtained history, Documenting clinical information in the electronic or other health record, Independently interpreting results (not separately reported) and communicating results to the patient/family/caregiver, or Care coordination (not separately reported).     Each patient to whom he or she provides medical services by telemedicine is:  (1) informed of the relationship between the physician and patient and the respective role of any other health care provider with respect to management of the patient; and (2) notified that he or she may decline to receive medical services by telemedicine and may withdraw from such care at any time.    Notes:     Therapeutic Intervention: Met with patient.  Outpatient - Behavior modifying psychotherapy 60 min - CPT code 85251    Chief complaint/reason for encounter: PTSD     Interval history and content of current session:   Cognitive Processing Therapy (CPT), Session #2  Examining the Impact of Trauma  PCL-5:  30  Impact Statement completed:  No    Session Focus:  Impact Statement review  Stuck Point log  Examined connections among events, thoughts, and feelings  Introduced ABC worksheets     Practice Assignment:  1) Stuck point log - Add to the stuck point log as needed  2) ABC worksheets - Complete daily self-monitoring on the relationships among events, thoughts, and feelings.  Complete at least one worksheet related to trauma.  Treatment plan:  Target symptoms:  PTSD  Why chosen therapy is appropriate versus another modality: evidence based  practice  Outcome monitoring methods: self-report, observation, feedback from family, checklist/rating scale  Therapeutic intervention type: behavior modifying psychotherapy    Risk parameters:  Patient reports no suicidal ideation  Patient reports no homicidal ideation  Patient reports no self-injurious behavior  Patient reports no violent behavior    Verbal deficits: None    Patient's response to intervention:  The patient's response to intervention is accepting.    Progress toward goals and other mental status changes:  The patient's progress toward goals is fair .    Diagnosis:     ICD-10-CM ICD-9-CM   1. PTSD (post-traumatic stress disorder)  F43.10 309.81   2. Generalized anxiety disorder with panic attacks  F41.1 300.02    F41.0 300.01     Plan:  individual psychotherapy    Return to clinic: 1 week    Length of Service (minutes): 60

## 2024-09-26 NOTE — PROGRESS NOTES
VIRTUAL/TELEMEDICINE VISIT   FAMILY MEDICINE  [facility]    The patient location is: [place]  The chief complaint leading to consultation is:   No chief complaint on file.    Visit type: Virtual visit with synchronous audio and video   Total time spent: 30 minute  Each patient to whom he or she provides medical services by telemedicine is:  (1) informed of the relationship between the physician and patient and the respective role of any other health care provider with respect to management of the patient; and (2) notified that he or she may decline to receive medical services by telemedicine and may withdraw from such care at any time.    Reason for visit:   No chief complaint on file.      SUBJECTIVE: Thelma Roman is a 29 y.o. female  - with depression and anxiety presents follow-up weight loss medication    Gynecology Dr. Shivani Rodgers MD  Plastic Surgery Dr. Ethel Stock MD  Psychiatry Dr. Ledy Hensley    1. Obesity BMI 33.23 start 8/28/24    Today 10/2/24 WEIGHT MANAGEMENT MEDICATION:She reports taking semaglutide 0.5 mg for about 3 weeks. She experienced vomiting during the first week at this dose. She continues to experience low-level nausea, particularly when she has not eaten, but notes it resolves with ginger ale consumption. She denies constipation or abdominal pain. She reports reduced appetite since starting the medication and is eating less overall. She has increased her fruit consumption compared to her usual intake, with no other significant dietary changes.    EXERCISE AND LIFESTYLE:She reports not exercising recently due to work commitments. She has a busy work schedule which has impacted her ability to engage in regular exercise.    Prior note  8/28/24: Thelma Roman reports that you spoke with her insurance company and they do cover weight loss medication.  She never started compounding semaglutide 0.25 mg weekly.  She wants to focus on diet and exercise.   She would like to start Wegovy 0.5 mg weekly.  06/27/2024:   She reports that she is concerned with her steady weight gain over the last 4 years.  Seemed to start during the COVID-19 pandemic.  She reports that she does not eat a large amount of food.  She does try to exercise regularly.  She went to a weight loss specialist and tried intermittent fasting, focusing on diet and exercise and a few medications that were not effective.  She is interested in starting a GLP 1 inhibitor.  She checked with her insurance they do cover Wegovy for weight loss.  She reports that she is read up on potential side effects.    Current weight:   10/2/24 222 lbs - compounded semaglutide/L carnitine 0.5 mg weekly increased to 1 mg weekly    Prior weight history:   8/28/24 225 lbs - start Wegovy 0.5 mg weekly however insurance did not cover the medication.  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    Prior weight loss program: intermittent fasting, medications (pill but unsure which one), diet and exercise    Medications for weight loss:  buPROPion (WELLBUTRIN XL) 150 MG TB24 tablet, Take 1 tablet (150 mg total) by mouth once daily., Disp: 30 tablet, Rfl: 1  semaglutide, weight loss, (WEGOVY) 0.5 mg/0.5 mL PnIj, Semaglutide-L-carnitine 1mg-100mg/1mL: inject 0.5 mL subcutenously weekly, dispense in 1 mL MDV for 1 month supply, Disp: 2 mL, Rfl: 0    Medications that may impede weight loss:     Venlafaxine    Comorbidities:    Depression and anxiety          Review of Systems   HENT:  Negative for hearing loss.    Eyes:  Negative for discharge.   Respiratory:  Negative for wheezing.    Cardiovascular:  Negative for chest pain and palpitations.   Gastrointestinal:  Negative for blood in stool, constipation, diarrhea and vomiting.   Genitourinary:  Negative for dysuria and hematuria.   Musculoskeletal:  Negative for neck pain.   Neurological:  Negative for weakness and headaches.   Endo/Heme/Allergies:  Negative for polydipsia.   All other systems reviewed and are negative.        HISTORY:   Past Medical History:   Diagnosis Date    Acute appendicitis 10/30/2020    Anxiety     Chronic pain of right knee 11/18/2018    Depression     Panic disorder without agoraphobia 9/16/2015       Past Surgical History:   Procedure Laterality Date    APPENDECTOMY      CARPAL TUNNEL RELEASE      FACIAL RECONSTRUCTION SURGERY      LAPAROSCOPIC APPENDECTOMY N/A 10/31/2020    Procedure: APPENDECTOMY, LAPAROSCOPIC;  Surgeon: Steve Saucedo Jr., MD;  Location: [facility] OR;  Service: General;  Laterality: N/A;    LAPAROSCOPIC SALPINGECTOMY Bilateral 11/09/2022    Procedure: SALPINGECTOMY, LAPAROSCOPIC, REMOVAL OF NEXPLANON ;  Surgeon: Shivani Rodgers MD;  Location: [facility] OR;  Service: OB/GYN;  Laterality: Bilateral;    RADIOFREQUENCY ABLATION  04/2024    [place]    TONSILLECTOMY         Family History   Problem Relation Name Age of Onset    No Known Problems Mother      Breast cancer Neg Hx      Ovarian cancer Neg Hx      Thyroid cancer Neg Hx         Social History     Tobacco Use    Smoking status: Never     Passive exposure: Never    Smokeless tobacco: Never   Substance Use Topics    Alcohol use: Never    Drug use: Never       Social History     Social History Narrative    Lives with . Astranged from her mother. Graduated [employer], majored in psychology with several minors. 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 . Denies alcohol, smoking or illicit drugs. [employer].        ALLERGIES:   Review of patient's allergies indicates:   Allergen Reactions    Sulfamethoxazole-trimethoprim Hives, Itching and Rash       MEDS:   Current Outpatient Medications on File Prior to Visit   Medication Sig Dispense Refill Last Dose/Taking    buPROPion (WELLBUTRIN XL) 150 MG TB24 tablet Take 1 tablet (150 mg total) by mouth once daily. 30 tablet 1 Taking    ondansetron (ZOFRAN) 4 MG tablet Take 1 tablet (4 mg total) by mouth every 6 (six) hours as needed for Nausea. 30 tablet 0 Taking As Needed    venlafaxine (EFFEXOR-XR) 150 MG Cp24 Take 1 capsule (150 mg total) by mouth once daily. Take with 75mg capsule for a total of 225mg daily. 30 capsule 1 Taking    venlafaxine (EFFEXOR-XR) 75 MG 24 hr capsule Take 1 capsule (75 mg total) by mouth once daily. Take with 150mg tablet for a total of 225mg. 30 capsule 1 Taking    [DISCONTINUED] semaglutide, weight loss, (WEGOVY) 0.5 mg/0.5 mL PnIj Semaglutide-L-carnitine 1mg-100mg/1mL: inject 5 mL subcutenously weekly, dispense in 1 mL MDV for 1 month supply 2 mL 0 Taking    prazosin (MINIPRESS) 1 MG Cap Take 1 capsule (1 mg total) by mouth every evening. 30 capsule 1        Vital signs:   Vitals:    10/02/24 0811   Weight: 100.7 kg (222 lb)   Height: 5' 9" (1.753 m)       Body mass index is 32.78 kg/m².    PHYSICAL EXAM:     Physical Exam  Constitutional:       General: She is not in acute distress.  Pulmonary:      Effort: Pulmonary effort is normal. No respiratory distress.   Neurological:      Mental Status: She is alert.   Psychiatric:         Speech: Speech normal.           PERTINENT RESULTS:   No visits with results within 1 Week(s) from this visit.   Latest known visit with results is:   Lab Visit on 06/25/2024   Component Date Value Ref Range Status    TSH 06/25/2024 1.295  0.400 - 4.000 uIU/mL Final    Cholesterol 06/25/2024 216 (H)  120 - 199 mg/dL Final    Comment: The National " Cholesterol Education Program (NCEP) has set the  following guidelines (reference ranges) for Cholesterol:  Optimal.....................<200 mg/dL  Borderline High.............200-239 mg/dL  High........................> or = 240 mg/dL      Triglycerides 06/25/2024 122  30 - 150 mg/dL Final    Comment: The National Cholesterol Education Program (NCEP) has set the  following guidelines (reference values) for triglycerides:  Normal......................<150 mg/dL  Borderline High.............150-199 mg/dL  High........................200-499 mg/dL      HDL 06/25/2024 52  40 - 75 mg/dL Final    Comment: The National Cholesterol Education Program (NCEP) has set the  following guidelines (reference values) for HDL Cholesterol:  Low...............<40 mg/dL  Optimal...........>60 mg/dL      LDL Cholesterol 06/25/2024 139.6  63.0 - 159.0 mg/dL Final    Comment: The National Cholesterol Education Program (NCEP) has set the  following guidelines (reference values) for LDL Cholesterol:  Optimal.......................<130 mg/dL  Borderline High...............130-159 mg/dL  High..........................160-189 mg/dL  Very High.....................>190 mg/dL      HDL/Cholesterol Ratio 06/25/2024 24.1  20.0 - 50.0 % Final    Total Cholesterol/HDL Ratio 06/25/2024 4.2  2.0 - 5.0 Final    Non-HDL Cholesterol 06/25/2024 164  mg/dL Final    Comment: Risk category and Non-HDL cholesterol goals:  Coronary heart disease (CHD)or equivalent (10-year risk of CHD >20%):  Non-HDL cholesterol goal     <130 mg/dL  Two or more CHD risk factors and 10-year risk of CHD <= 20%:  Non-HDL cholesterol goal     <160 mg/dL  0 to 1 CHD risk factor:  Non-HDL cholesterol goal     <190 mg/dL      Hemoglobin A1C 06/25/2024 5.3  4.0 - 5.6 % Final    Comment: ADA Screening Guidelines:  5.7-6.4%  Consistent with prediabetes  >or=6.5%  Consistent with diabetes    High levels of fetal hemoglobin interfere with the HbA1C  assay. Heterozygous hemoglobin variants  (HbS, HgC, etc)do  not significantly interfere with this assay.   However, presence of multiple variants may affect accuracy.      Estimated Avg Glucose 06/25/2024 105  68 - 131 mg/dL Final    Sodium 06/25/2024 140  136 - 145 mmol/L Final    Potassium 06/25/2024 4.1  3.5 - 5.1 mmol/L Final    Chloride 06/25/2024 105  95 - 110 mmol/L Final    CO2 06/25/2024 25  23 - 29 mmol/L Final    Glucose 06/25/2024 96  70 - 110 mg/dL Final    BUN 06/25/2024 8  6 - 20 mg/dL Final    Creatinine 06/25/2024 0.9  0.5 - 1.4 mg/dL Final    Calcium 06/25/2024 10.2  8.7 - 10.5 mg/dL Final    Total Protein 06/25/2024 7.6  6.0 - 8.4 g/dL Final    Albumin 06/25/2024 4.1  3.5 - 5.2 g/dL Final    Total Bilirubin 06/25/2024 0.2  0.1 - 1.0 mg/dL Final    Comment: For infants and newborns, interpretation of results should be based  on gestational age, weight and in agreement with clinical  observations.    Premature Infant recommended reference ranges:  Up to 24 hours.............<8.0 mg/dL  Up to 48 hours............<12.0 mg/dL  3-5 days..................<15.0 mg/dL  6-29 days.................<15.0 mg/dL      Alkaline Phosphatase 06/25/2024 88  55 - 135 U/L Final    AST 06/25/2024 18  10 - 40 U/L Final    ALT 06/25/2024 16  10 - 44 U/L Final    eGFR 06/25/2024 >60.0  >60 mL/min/1.73 m^2 Final    Anion Gap 06/25/2024 10  8 - 16 mmol/L Final    WBC 06/25/2024 9.14  3.90 - 12.70 K/uL Final    RBC 06/25/2024 4.73  4.00 - 5.40 M/uL Final    Hemoglobin 06/25/2024 13.9  12.0 - 16.0 g/dL Final    Hematocrit 06/25/2024 42.0  37.0 - 48.5 % Final    MCV 06/25/2024 89  82 - 98 fL Final    MCH 06/25/2024 29.4  27.0 - 31.0 pg Final    MCHC 06/25/2024 33.1  32.0 - 36.0 g/dL Final    RDW 06/25/2024 13.1  11.5 - 14.5 % Final    Platelets 06/25/2024 405  150 - 450 K/uL Final    MPV 06/25/2024 10.3  9.2 - 12.9 fL Final    Immature Granulocytes 06/25/2024 0.2  0.0 - 0.5 % Final    Gran # (ANC) 06/25/2024 5.2  1.8 - 7.7 K/uL Final    Immature Grans (Abs) 06/25/2024  0.02  0.00 - 0.04 K/uL Final    Comment: Mild elevation in immature granulocytes is non specific and   can be seen in a variety of conditions including stress response,   acute inflammation, trauma and pregnancy. Correlation with other   laboratory and clinical findings is essential.      Lymph # 06/25/2024 3.0  1.0 - 4.8 K/uL Final    Mono # 06/25/2024 0.8  0.3 - 1.0 K/uL Final    Eos # 06/25/2024 0.1  0.0 - 0.5 K/uL Final    Baso # 06/25/2024 0.07  0.00 - 0.20 K/uL Final    nRBC 06/25/2024 0  0 /100 WBC Final    Gran % 06/25/2024 57.0  38.0 - 73.0 % Final    Lymph % 06/25/2024 32.6  18.0 - 48.0 % Final    Mono % 06/25/2024 8.3  4.0 - 15.0 % Final    Eosinophil % 06/25/2024 1.1  0.0 - 8.0 % Final    Basophil % 06/25/2024 0.8  0.0 - 1.9 % Final    Differential Method 06/25/2024 Automated   Final       ASSESSMENT/PLAN:    1. Obesity (BMI 30-39.9)  Overview:  - Thelma Roman denies history of pancreatitis or heavy alcohol use and denies family and person history of thyroid medullary cancer and MENs  - discussed recommendation for diet and cardiovascular exercise  - counseling on lifestyle modifications for risk factor reduction  - discussed cost being a limiting issue for weight loss medication and directed to  website  - recommend add psyllium husk fiber supplement.  I recommend starting with once a day and increase as tolerated.  Recommend good water intake  - recommend diversify diet and goal of 3-4 colors of the rainbow in every meal and avoid UPF  - okay for ondansetron as needed for nausea  - starting weight 08/28/2024 225 lbs  - 10/02/2024   Wt Readings from Last 1 Encounters:   10/02/24 100.7 kg (222 lb)   - 3 lbs lost  Assessment & Plan      Evaluated current weight and dietary changes, observing increased fruit intake and reduced overall food consumption  Considered increasing dose to 1mg despite initial side effects, as patient reports improved tolerance    NAUSEA:  - Explained ginger's  anti-nausea properties.  - Discussed importance of small, frequent meals to prevent nausea.  - Thelma to eat small, frequent meals.    WEIGHT MANAGEMENT:  - Emphasized focus on protein and vegetable intake for sustainable weight loss.  - Educated on potential muscle mass loss during initial weight loss phase.  - Informed about typical weight loss patterns with semaglutide at different doses.  - Thelma to increase protein and vegetable intake.  - Thelma to incorporate strength training exercises.  - Increased semaglutide to 1mg weekly, to be compounded with L-carnitine.    FOLLOW UP:  - Follow up in 1 month.  - Nurse to contact patient for appointment scheduling.         Orders:  -     semaglutide, weight loss, 1 mg/0.5 mL PnIj; Semaglutide-L-carnitine 1mg-100mg/1mL: inject 1 mL subcutenously weekly, dispense in 4 MDVs for 1 month supply  Dispense: 4 mL; Refill: 1                ORDERS:   Orders Placed This Encounter    semaglutide, weight loss, 1 mg/0.5 mL PnIj         Vaccines recommended:  Flu, Prevnar COVID-19    Follow up in about 1 month (around 11/2/2024) for Virtual Visit, Weight. or sooner with any concerns      This note is dictated using the M*Modal Fluency Direct word recognition program. There are word recognition mistakes that are occasionally missed on review.    Dr. Amy Molina D.O.   Family Medicine

## 2024-10-01 ENCOUNTER — OFFICE VISIT (OUTPATIENT)
Dept: PSYCHIATRY | Facility: CLINIC | Age: 29
End: 2024-10-01
Payer: COMMERCIAL

## 2024-10-01 ENCOUNTER — PATIENT MESSAGE (OUTPATIENT)
Dept: PSYCHIATRY | Facility: CLINIC | Age: 29
End: 2024-10-01

## 2024-10-01 DIAGNOSIS — F43.10 PTSD (POST-TRAUMATIC STRESS DISORDER): Primary | ICD-10-CM

## 2024-10-01 NOTE — PROGRESS NOTES
Individual Psychotherapy (PhD/LCSW)    10/1/2024    Site:  Telemed       The patient location is: [place]  The chief complaint leading to consultation is: PTSD    Visit type: audiovisual    Face to Face time with patient: 50  60 minutes of total time spent on the encounter, which includes face to face time and non-face to face time preparing to see the patient (eg, review of tests), Obtaining and/or reviewing separately obtained history, Documenting clinical information in the electronic or other health record, Independently interpreting results (not separately reported) and communicating results to the patient/family/caregiver, or Care coordination (not separately reported).     Each patient to whom he or she provides medical services by telemedicine is:  (1) informed of the relationship between the physician and patient and the respective role of any other health care provider with respect to management of the patient; and (2) notified that he or she may decline to receive medical services by telemedicine and may withdraw from such care at any time.    Notes:     Therapeutic Intervention: Met with patient.  Outpatient - Behavior modifying psychotherapy 45 min - CPT code 34673    Chief complaint/reason for encounter: PTSD     Interval history and content of current session:   Cognitive Processing Therapy (CPT), Session #3  Working with Events, Thoughts, and Feelings  PCL-5:  29  Worksheets completed:  3    Session Focus:  Stuck Point log  ABC worksheets review     Practice Assignment:  1) Stuck point log - Add to the stuck point log as needed  2) ABC worksheets - Complete daily self-monitoring on the relationships among events, thoughts, and feelings.  Complete one worksheet every day related to trauma.  Treatment plan:  Target symptoms:  PTSD  Why chosen therapy is appropriate versus another modality: evidence based practice  Outcome monitoring methods: self-report, observation, feedback from family,  checklist/rating scale  Therapeutic intervention type: behavior modifying psychotherapy    Risk parameters:  Patient reports no suicidal ideation  Patient reports no homicidal ideation  Patient reports no self-injurious behavior  Patient reports no violent behavior    Verbal deficits: None    Patient's response to intervention:  The patient's response to intervention is accepting.    Progress toward goals and other mental status changes:  The patient's progress toward goals is fair .    Diagnosis:     ICD-10-CM ICD-9-CM   1. PTSD (post-traumatic stress disorder)  F43.10 309.81       Plan:  individual psychotherapy    Return to clinic: 1 week    Length of Service (minutes): 45

## 2024-10-02 ENCOUNTER — PATIENT MESSAGE (OUTPATIENT)
Dept: PRIMARY CARE CLINIC | Facility: CLINIC | Age: 29
End: 2024-10-02

## 2024-10-02 ENCOUNTER — OFFICE VISIT (OUTPATIENT)
Dept: PRIMARY CARE CLINIC | Facility: CLINIC | Age: 29
End: 2024-10-02
Attending: FAMILY MEDICINE
Payer: COMMERCIAL

## 2024-10-02 VITALS — WEIGHT: 222 LBS | HEIGHT: 69 IN | BODY MASS INDEX: 32.88 KG/M2

## 2024-10-02 DIAGNOSIS — E66.9 OBESITY (BMI 30-39.9): Primary | ICD-10-CM

## 2024-10-02 PROCEDURE — 1160F RVW MEDS BY RX/DR IN RCRD: CPT | Mod: CPTII,95,, | Performed by: FAMILY MEDICINE

## 2024-10-02 PROCEDURE — 1159F MED LIST DOCD IN RCRD: CPT | Mod: CPTII,95,, | Performed by: FAMILY MEDICINE

## 2024-10-02 PROCEDURE — 99214 OFFICE O/P EST MOD 30 MIN: CPT | Mod: 95,,, | Performed by: FAMILY MEDICINE

## 2024-10-02 PROCEDURE — 3008F BODY MASS INDEX DOCD: CPT | Mod: CPTII,95,, | Performed by: FAMILY MEDICINE

## 2024-10-02 PROCEDURE — 3044F HG A1C LEVEL LT 7.0%: CPT | Mod: CPTII,95,, | Performed by: FAMILY MEDICINE

## 2024-10-11 ENCOUNTER — OFFICE VISIT (OUTPATIENT)
Dept: PSYCHIATRY | Facility: CLINIC | Age: 29
End: 2024-10-11
Payer: COMMERCIAL

## 2024-10-11 DIAGNOSIS — F43.10 PTSD (POST-TRAUMATIC STRESS DISORDER): Primary | ICD-10-CM

## 2024-10-11 NOTE — PROGRESS NOTES
Individual Psychotherapy (PhD/LCSW)    10/11/2024    Site:  Telemed       The patient location is: [place]  The chief complaint leading to consultation is: PTSD    Visit type: audiovisual    Face to Face time with patient: 57  60 minutes of total time spent on the encounter, which includes face to face time and non-face to face time preparing to see the patient (eg, review of tests), Obtaining and/or reviewing separately obtained history, Documenting clinical information in the electronic or other health record, Independently interpreting results (not separately reported) and communicating results to the patient/family/caregiver, or Care coordination (not separately reported).     Each patient to whom he or she provides medical services by telemedicine is:  (1) informed of the relationship between the physician and patient and the respective role of any other health care provider with respect to management of the patient; and (2) notified that he or she may decline to receive medical services by telemedicine and may withdraw from such care at any time.    Notes:     Therapeutic Intervention: Met with patient.  Outpatient - Behavior modifying psychotherapy 45 min - CPT code 44627    Chief complaint/reason for encounter: PTSD     Interval history and content of current session:   Cognitive Processing Therapy (CPT), Session #4  Examining the Index Event  PCL-5:  29  Worksheets completed:  3    Session Focus:  Stuck Point log  ABC worksheets review  Discussed levels of responsibility  Introduced Challenging Questions worksheet     Practice Assignment:  1) Stuck point log - Add to the stuck point log as needed  2) Challenging Questions worksheets - Complete at least one worksheet related to trauma.    Treatment plan:  Target symptoms:  PTSD  Why chosen therapy is appropriate versus another modality: evidence based practice  Outcome monitoring methods: self-report, observation, feedback from family, checklist/rating  scale  Therapeutic intervention type: behavior modifying psychotherapy    Risk parameters:  Patient reports no suicidal ideation  Patient reports no homicidal ideation  Patient reports no self-injurious behavior  Patient reports no violent behavior    Verbal deficits: None    Patient's response to intervention:  The patient's response to intervention is accepting.    Progress toward goals and other mental status changes:  The patient's progress toward goals is fair .    Diagnosis:     ICD-10-CM ICD-9-CM   1. PTSD (post-traumatic stress disorder)  F43.10 309.81     Plan:  individual psychotherapy    Return to clinic: 1 week    Length of Service (minutes): 60

## 2024-10-15 ENCOUNTER — PATIENT MESSAGE (OUTPATIENT)
Dept: PSYCHIATRY | Facility: CLINIC | Age: 29
End: 2024-10-15

## 2024-10-15 ENCOUNTER — OFFICE VISIT (OUTPATIENT)
Dept: PSYCHIATRY | Facility: CLINIC | Age: 29
End: 2024-10-15
Payer: COMMERCIAL

## 2024-10-15 DIAGNOSIS — F43.10 PTSD (POST-TRAUMATIC STRESS DISORDER): Primary | ICD-10-CM

## 2024-10-15 PROCEDURE — 90834 PSYTX W PT 45 MINUTES: CPT | Mod: 95,,,

## 2024-10-15 PROCEDURE — 3044F HG A1C LEVEL LT 7.0%: CPT | Mod: CPTII,95,,

## 2024-10-15 NOTE — PROGRESS NOTES
Individual Psychotherapy (PhD/LCSW)    10/15/2024    Site:  Telemed       The patient location is: [place]  The chief complaint leading to consultation is: PTSD    Visit type: audiovisual    Face to Face time with patient: 47  60 minutes of total time spent on the encounter, which includes face to face time and non-face to face time preparing to see the patient (eg, review of tests), Obtaining and/or reviewing separately obtained history, Documenting clinical information in the electronic or other health record, Independently interpreting results (not separately reported) and communicating results to the patient/family/caregiver, or Care coordination (not separately reported).     Each patient to whom he or she provides medical services by telemedicine is:  (1) informed of the relationship between the physician and patient and the respective role of any other health care provider with respect to management of the patient; and (2) notified that he or she may decline to receive medical services by telemedicine and may withdraw from such care at any time.    Notes:     Therapeutic Intervention: Met with patient.  Outpatient - Behavior modifying psychotherapy 45 min - CPT code 08066    Chief complaint/reason for encounter: PTSD     Interval history and content of current session:   Cognitive Processing Therapy (CPT), Session #5 (book session 4)  Examining the Index Event  PCL-5:  27  Worksheets completed:  1    Session Focus:  Stuck Point log  ABC worksheets review  Discussed levels of responsibility  Introduced Challenging Questions worksheet     Practice Assignment:  1) Stuck point log - Add to the stuck point log as needed  2) Challenging Questions worksheets - Complete at least one worksheet related to trauma.    Treatment plan:  Target symptoms:  PTSD  Why chosen therapy is appropriate versus another modality: evidence based practice  Outcome monitoring methods: self-report, observation, feedback from family,  checklist/rating scale  Therapeutic intervention type: behavior modifying psychotherapy    Risk parameters:  Patient reports no suicidal ideation  Patient reports no homicidal ideation  Patient reports no self-injurious behavior  Patient reports no violent behavior    Verbal deficits: None    Patient's response to intervention:  The patient's response to intervention is accepting.    Progress toward goals and other mental status changes:  The patient's progress toward goals is fair .    Diagnosis:     ICD-10-CM ICD-9-CM   1. PTSD (post-traumatic stress disorder)  F43.10 309.81     Plan:  individual psychotherapy    Return to clinic: 1 week    Length of Service (minutes): 45

## 2024-10-22 ENCOUNTER — OFFICE VISIT (OUTPATIENT)
Dept: PSYCHIATRY | Facility: CLINIC | Age: 29
End: 2024-10-22
Payer: COMMERCIAL

## 2024-10-22 DIAGNOSIS — F43.10 PTSD (POST-TRAUMATIC STRESS DISORDER): Primary | ICD-10-CM

## 2024-10-22 PROCEDURE — 90837 PSYTX W PT 60 MINUTES: CPT | Mod: 95,,,

## 2024-10-22 PROCEDURE — 3044F HG A1C LEVEL LT 7.0%: CPT | Mod: CPTII,95,,

## 2024-10-22 NOTE — PROGRESS NOTES
Individual Psychotherapy (PhD/LCSW)    10/22/2024    Site:  Telemed       The patient location is: [place]  The chief complaint leading to consultation is: PTSD    Visit type: audiovisual    Face to Face time with patient: 63  60 minutes of total time spent on the encounter, which includes face to face time and non-face to face time preparing to see the patient (eg, review of tests), Obtaining and/or reviewing separately obtained history, Documenting clinical information in the electronic or other health record, Independently interpreting results (not separately reported) and communicating results to the patient/family/caregiver, or Care coordination (not separately reported).     Each patient to whom he or she provides medical services by telemedicine is:  (1) informed of the relationship between the physician and patient and the respective role of any other health care provider with respect to management of the patient; and (2) notified that he or she may decline to receive medical services by telemedicine and may withdraw from such care at any time.    Notes:     Therapeutic Intervention: Met with patient.  Outpatient - Behavior modifying psychotherapy 60 min - CPT code 32502    Chief complaint/reason for encounter: PTSD     Interval history and content of current session:   Cognitive Processing Therapy (CPT), Session #6 (book session 5)  Using the Challenging Questions Worksheet  PCL-5:  24  Worksheets completed:  1    Session Focus:  Stuck Point log  Challenging Questions worksheets review  Introduced Patterns of Problematic Thinking worksheet     Practice Assignment:  1) Stuck point log - Add to the stuck point log as needed  2) Patterns of Problematic worksheets - Complete at least one related to a Stuck Point from the Stuck Point Log.  Complete one worksheet every day.          Treatment plan:  Target symptoms:  PTSD  Why chosen therapy is appropriate versus another modality: evidence based  practice  Outcome monitoring methods: self-report, observation, feedback from family, checklist/rating scale  Therapeutic intervention type: behavior modifying psychotherapy    Risk parameters:  Patient reports no suicidal ideation  Patient reports no homicidal ideation  Patient reports no self-injurious behavior  Patient reports no violent behavior    Verbal deficits: None    Patient's response to intervention:  The patient's response to intervention is accepting.    Progress toward goals and other mental status changes:  The patient's progress toward goals is fair .    Diagnosis:     ICD-10-CM ICD-9-CM   1. PTSD (post-traumatic stress disorder)  F43.10 309.81       Plan:  individual psychotherapy    Return to clinic: 1 week    Length of Service (minutes): 60

## 2024-11-19 ENCOUNTER — OFFICE VISIT (OUTPATIENT)
Dept: PSYCHIATRY | Facility: CLINIC | Age: 29
End: 2024-11-19
Payer: COMMERCIAL

## 2024-11-19 DIAGNOSIS — F43.10 PTSD (POST-TRAUMATIC STRESS DISORDER): Primary | ICD-10-CM

## 2024-11-19 NOTE — PROGRESS NOTES
Individual Psychotherapy (PhD/LCSW)    11/19/2024    Site:  Telemed       The patient location is: [place]  The chief complaint leading to consultation is: PTSD    Visit type: audiovisual    Face to Face time with patient: 30  60 minutes of total time spent on the encounter, which includes face to face time and non-face to face time preparing to see the patient (eg, review of tests), Obtaining and/or reviewing separately obtained history, Documenting clinical information in the electronic or other health record, Independently interpreting results (not separately reported) and communicating results to the patient/family/caregiver, or Care coordination (not separately reported).     Each patient to whom he or she provides medical services by telemedicine is:  (1) informed of the relationship between the physician and patient and the respective role of any other health care provider with respect to management of the patient; and (2) notified that he or she may decline to receive medical services by telemedicine and may withdraw from such care at any time.    Notes:     Therapeutic Intervention: Met with patient.  Outpatient - Behavior modifying psychotherapy 30 min - CPT code 79200    Chief complaint/reason for encounter: PTSD     Interval history and content of current session:   Cognitive Processing Therapy (CPT), Session #7  Processing and Final Impact Statement  PCL-5:  19  Worksheets completed:  0  Impact Statement completed:  Yes -- together in session     Session Focus:  Challenging Beliefs worksheets review  Original and New Impact Statements review  Reviewed course of treatment and progress  Identified goals for the future     Practice Assignment:  1) Continue to review treatment materials as needed.  2) Continue to do Challenging Beliefs worksheets as needed.    Treatment plan:  Target symptoms:  PTSD  Why chosen therapy is appropriate versus another modality: evidence based practice  Outcome monitoring  methods: self-report, observation, feedback from family, checklist/rating scale  Therapeutic intervention type: behavior modifying psychotherapy    Risk parameters:  Patient reports no suicidal ideation  Patient reports no homicidal ideation  Patient reports no self-injurious behavior  Patient reports no violent behavior    Verbal deficits: None    Patient's response to intervention:  The patient's response to intervention is accepting.    Progress toward goals and other mental status changes:  The patient's progress toward goals is fair .    Diagnosis:     ICD-10-CM ICD-9-CM   1. PTSD (post-traumatic stress disorder)  F43.10 309.81         Plan: CPT has ended.  Referred back to referring provider  Return to clinic: as scheduled by referring provider    Length of Service (minutes): 30

## 2024-12-18 ENCOUNTER — OFFICE VISIT (OUTPATIENT)
Dept: PSYCHIATRY | Facility: CLINIC | Age: 29
End: 2024-12-18

## 2024-12-18 DIAGNOSIS — F41.0 GENERALIZED ANXIETY DISORDER WITH PANIC ATTACKS: ICD-10-CM

## 2024-12-18 DIAGNOSIS — F41.1 GENERALIZED ANXIETY DISORDER WITH PANIC ATTACKS: ICD-10-CM

## 2024-12-18 DIAGNOSIS — F43.10 PTSD (POST-TRAUMATIC STRESS DISORDER): ICD-10-CM

## 2024-12-18 DIAGNOSIS — F33.40 RECURRENT MAJOR DEPRESSIVE DISORDER, IN REMISSION: Primary | ICD-10-CM

## 2024-12-18 PROCEDURE — 99214 OFFICE O/P EST MOD 30 MIN: CPT | Mod: 95,,,

## 2024-12-18 RX ORDER — BUPROPION HYDROCHLORIDE 150 MG/1
150 TABLET ORAL EVERY OTHER DAY
Qty: 15 TABLET | Refills: 0 | Status: SHIPPED | OUTPATIENT
Start: 2024-12-18 | End: 2025-01-17

## 2024-12-18 RX ORDER — VENLAFAXINE HYDROCHLORIDE 150 MG/1
150 CAPSULE, EXTENDED RELEASE ORAL DAILY
Qty: 30 CAPSULE | Refills: 3 | Status: SHIPPED | OUTPATIENT
Start: 2024-12-18 | End: 2025-04-17

## 2024-12-18 RX ORDER — VENLAFAXINE HYDROCHLORIDE 75 MG/1
75 CAPSULE, EXTENDED RELEASE ORAL DAILY
Qty: 30 CAPSULE | Refills: 3 | Status: SHIPPED | OUTPATIENT
Start: 2024-12-18 | End: 2025-04-17

## 2024-12-18 NOTE — PROGRESS NOTES
OUTPATIENT PSYCHIATRY FOLLOW UP VISIT    ENCOUNTER DATE:  12/18/2024  SITE:  [facility]  LENGTH OF SESSION:  30 minutes    TELE PSYCHIATRY Disclaimer   *The patient was informed despite using HIPPA compliant technology there may be risks including security breach, technological failure, inability to perform a comprehensive physical exam which could delay or prevent an accurate diagnosis, and potential complications from treatment decisions rendered over a telemedical platform. The patient understands and consented to the use of tele-health service as being a safe measure to mitigate during COVID 19 Pandemic .  The patient was also informed of the relationship between the physician and patient and the respective role of any other health care provider with respect to management of the patient; and notified that the pt may decline to receive medical services by telemedicine and may withdraw from such care at any time.     Patient's Current location: [address] [place] [address]  In Case of Emergency pts next of kin  Extended Emergency Contact Information  Primary Emergency Contact: Fernando Neri   [place]  Mobile Phone: [phone]  Relation: Spouse  Preferred language: English   needed? No  Secondary Emergency Contact: Figueroa Rivers  Address: [address] [place] [address]  Home Phone: [phone]  Relation: Father  Visit type: Virtual visit with synchronous audio and video  Total time spent with patient: 15 minutes    CHIEF COMPLAINT:  Depression       HISTORY OF PRESENTING ILLNESS:  Thelma Roman is a 29 y.o. female with history of MDD, HEMA, PTSD who presents for follow up appointment.      Plan at last appointment on 8/29/2024:  Continue meds:   Wellbutrin XL 150mg daily   Effexor XR 225mg daily   STOP Clonidine 0.2mg nightly  START Prazosin 1mg nightly for at least 2 weeks, able to increase to 2mg  nightly as tolerated. Advised to reach out with any questions, concerns, adverse effects.        Interval history as told by Patient - & or family/friend/spouse/caregiver with pts permission    - pt reports doing well since last appointment. Did trauma-focused therapy via [facility] for the last several months until she reached full therapeutic benefit, pt no longer dealing with symptoms relating to trauma. Pt reports her anxiety and depression are also very well controlled, and feels they are manageable.   - meds: Wellbutrin XL 150mg, Effexor XR 225mg. Did not start the Prazosin prescribed at last appointment.   - discussed interest in tapering off meds given her current lack of symptoms, improvement with therapy, and lack of situational stressors in her personal and work life. Pt reports that a lot of her stressors were related to her financial situation, which is now much better with her job.       PSYCHIATRIC REVIEW OF SYSTEMS:(none/ yes- better/worse/stable/& what symptoms)    Symptoms of Depression: stable     Symptoms of Anxiety/ panic attacks: stable     Symptoms of Kathy/Hypomania: denies     Symptoms of psychosis: denies   PANSS Positive /Negative    Sleep: denies     Appetite: denies     Other: denies PTSD-related symptoms including nightmares, negative cognitions, hypervigilance     Alcohol:     Illicit Drugs:    Psychosocial stressors:     Risk Parameters:  Patient reports no suicidal ideation  Patient reports no homicidal ideation  Patient reports no self-injurious behavior  Patient reports no violent behavior    PSYCHIATRIC MED REVIEW    Current psych meds  Medication side effects:  denies  Medication compliance:  adherent    Previous psych meds trials  Zoloft  Prozac  Lexapro   Klonopin  Propranolol   Clonidine    PAST PSYCHIATRIC, MEDICAL, AND SOCIAL HISTORY REVIEWED  The patient's past medical, family and social history have been reviewed and updated as appropriate within the electronic medical record  - see encounter notes.    MEDICAL REVIEW OF SYSTEMS:  Complete review of systems performed covering Constitutional, Musculoskeletal, Neurologic.  All systems negative.    ALL MEDICATIONS:    Current Outpatient Medications:     buPROPion (WELLBUTRIN XL) 150 MG TB24 tablet, Take 1 tablet (150 mg total) by mouth once daily., Disp: 30 tablet, Rfl: 1    ondansetron (ZOFRAN) 4 MG tablet, Take 1 tablet (4 mg total) by mouth every 6 (six) hours as needed for Nausea., Disp: 30 tablet, Rfl: 0    prazosin (MINIPRESS) 1 MG Cap, Take 1 capsule (1 mg total) by mouth every evening., Disp: 30 capsule, Rfl: 1    semaglutide, weight loss, 1 mg/0.5 mL PnIj, Semaglutide-L-carnitine 1mg-100mg/1mL: inject 1 mL subcutenously weekly, dispense in 4 MDVs for 1 month supply, Disp: 4 mL, Rfl: 1    venlafaxine (EFFEXOR-XR) 150 MG Cp24, Take 1 capsule (150 mg total) by mouth once daily. Take with 75mg capsule for a total of 225mg daily., Disp: 30 capsule, Rfl: 1    ALLERGIES:  Review of patient's allergies indicates:   Allergen Reactions    Sulfamethoxazole-trimethoprim Hives, Itching and Rash       RELEVANT LABS/STUDIES:    Lab Results   Component Value Date    WBC 9.14 06/25/2024    HGB 13.9 06/25/2024    HCT 42.0 06/25/2024    MCV 89 06/25/2024     06/25/2024     BMP  Lab Results   Component Value Date     06/25/2024    K 4.1 06/25/2024     06/25/2024    CO2 25 06/25/2024    BUN 8 06/25/2024    CREATININE 0.9 06/25/2024    CALCIUM 10.2 06/25/2024    ANIONGAP 10 06/25/2024    ESTGFRAFRICA >60 05/18/2022    EGFRNONAA >60 05/18/2022     Lab Results   Component Value Date    ALT 16 06/25/2024    AST 18 06/25/2024    ALKPHOS 88 06/25/2024    BILITOT 0.2 06/25/2024     Lab Results   Component Value Date    TSH 1.295 06/25/2024     Lab Results   Component Value Date    HGBA1C 5.3 06/25/2024       VITALS  There were no vitals filed for this visit. - virtual     PHYSICAL EXAM  General: well developed, well nourished  Neurologic:  "  Gait: virtual   Psychomotor signs:  No involuntary movements or tremor  AIMS: none    PSYCHIATRIC EXAM:     Mental Status Exam:  Appearance: unremarkable, age appropriate  Behavior/Cooperation: normal, cooperative  Speech: normal tone, normal rate, normal pitch, normal volume  Language: uses words appropriately; NO aphasia or dysarthria  Mood: "good"  Affect: reactive, full, appropriate  Thought Process: normal and logical  Thought Content: normal, no suicidality, no homicidality, delusions, or paranoia  Level of Consciousness: Alert and Oriented x3  Memory:  Intact  Attention/concentration: appropriate for age/education.   Fund of Knowledge: appears adequate  Insight:  Intact  Judgment: Intact       IMPRESSION:    Thelma Roman is a 29 y.o. female with history of MDD, HEMA, PTSD, who presents for follow up appointment for medication management. Pt completed trauma-focused therapy and is doing well from this standpoint, reports no symptoms from trauma, and reports depression and anxiety are well controlled as well. Pt feeling her life is stable enough to try to taper meds and see if she still needs them, given the success of therapy. Agreeable to taper off Wellbutrin at this time. Discussed monitoring for return of depression symptoms. Advised to present to the ED if pt experiences suicidal ideation or other significant worsening of her psychiatric symptoms. Reviewed risks, benefits, adverse effects. Pt expressed understanding of all this.     Status/Progress:  Based on the examination today, the patient's problem(s) is/are well controlled.  New problems have not been presented today.     DIAGNOSES:    ICD-10-CM ICD-9-CM   1. Recurrent major depressive disorder, in remission  F33.40 296.35   2. Generalized anxiety disorder with panic attacks  F41.1 300.02    F41.0 300.01   3. PTSD (post-traumatic stress disorder)  F43.10 309.81       PLAN:  Psych Med:  Continue Effexor XR 225mg daily for anxiety, " depression  TAPER OFF Wellbutrin XL 150mg - take very other day for 2-4 weeks then stop, per patient's symptomatic response and any adverse effects.     Discussed with patient informed consent, risks vs. benefits, alternative treatments, side effect profile and the inherent unpredictability of individual responses to these treatments. Answered any questions patient may have had. The patient expresses understanding of the above and displays the capacity to agree with this current plan     Other: none at this time    RETURN TO CLINIC:  1 month or sooner as needed      Kyrie Dubose MD  [facility] PGY-III   12/18/2024 8:32 AM      A total of 30 minutes was spent today on this encounter, including chart review,  review, seeing patient, documenting encounter, and ordering medications.

## 2024-12-18 NOTE — PROGRESS NOTES
STAFF COMMENTS: I have discussed pt with Dr. Dubose and reviewed the history and exam. I agree and concur with the assessment and plan.

## 2025-01-16 NOTE — PROGRESS NOTES
VIRTUAL/TELEMEDICINE VISIT   FAMILY MEDICINE  [facility]    The patient location is: [place]  The chief complaint leading to consultation is:   Chief Complaint   Patient presents with    Follow-up     Weight loss medication     Visit type: Virtual visit with synchronous audio and video   Total time spent: 30 minute  Each patient to whom he or she provides medical services by telemedicine is:  (1) informed of the relationship between the physician and patient and the respective role of any other health care provider with respect to management of the patient; and (2) notified that he or she may decline to receive medical services by telemedicine and may withdraw from such care at any time.    Reason for visit:   Chief Complaint   Patient presents with    Follow-up     Weight loss medication       SUBJECTIVE: Thelma Roman is a 29 y.o. female  - with depression and anxiety presents follow-up weight loss medication    Gynecology Dr. Shivani Rodgers MD  Plastic Surgery Dr. Ethel Stock MD  Psychiatry Dr. Ledy Hensley    1. Obesity starting BMI 33.23 8/28/24    Today 1/22/25 Thelma Roman reports that she has been well.  She was on semaglutide 1 mg weekly up to 1 month ago.  He ran out of medication after she missed a follow up visit.  Unfortunately, she reports a 1 mg made her very nauseated.  She had episodes emesis.  She reports that she felt better on 0 5 mg.  She lost about 15 lb since starting the medication.  And thankfully she reports that she has been stable since she ran out of the medication at 2:15 a.m..  She is overall pleased.  She would like to restart her semaglutide 0.5 mg weekly  She has changed jobs but reports that her activity at work is about same.  She continues to try to exercise regularly.  She reports that she does not feel her diet has changed since starting medication.    Prior note  10/2/24 WEIGHT MANAGEMENT MEDICATION:She reports taking semaglutide 0.5  mg for about 3 weeks. She experienced vomiting during the first week at this dose. She continues to experience low-level nausea, particularly when she has not eaten, but notes it resolves with ginger ale consumption. She denies constipation or abdominal pain. She reports reduced appetite since starting the medication and is eating less overall. She has increased her fruit consumption compared to her usual intake, with no other significant dietary changes.    EXERCISE AND LIFESTYLE:She reports not exercising recently due to work commitments. She has a busy work schedule which has impacted her ability to engage in regular exercise.    8/28/24: Thelma Roman reports that you spoke with her insurance company and they do cover weight loss medication.  She never started compounding semaglutide 0.25 mg weekly.  She wants to focus on diet and exercise.  She would like to start Wegovy 0.5 mg weekly.  06/27/2024:   She reports that she is concerned with her steady weight gain over the last 4 years.  Seemed to start during the COVID-19 pandemic.  She reports that she does not eat a large amount of food.  She does try to exercise regularly.  She went to a weight loss specialist and tried intermittent fasting, focusing on diet and exercise and a few medications that were not effective.  She is interested in starting a GLP 1 inhibitor.  She checked with her insurance they do cover Wegovy for weight loss.  She reports that she is read up on potential side effects.    Current weight:   01/22/2025 215 lbs - restart semaglutide as 0.5 mg weekly    Prior weight history:   10/2/24 222 lbs - compounded semaglutide/L carnitine 0.5 mg weekly increased to 1 mg weekly  8/28/24 225 lbs - start Wegovy 0.5 mg weekly however insurance did not cover the medication.  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    Eyes:  Negative for discharge.   Respiratory:  Negative for wheezing.    Cardiovascular:  Negative for chest pain and palpitations.   Gastrointestinal:  Negative for blood in stool, constipation, diarrhea and vomiting.   Genitourinary:  Negative for dysuria and hematuria.   Musculoskeletal:  Negative for neck pain.   Neurological:  Negative for weakness and headaches.   Endo/Heme/Allergies:  Negative for polydipsia.   All other systems reviewed and are negative.        HISTORY:   Past Medical History:   Diagnosis Date    Acute appendicitis 10/30/2020    Anxiety     Chronic pain of right knee 11/18/2018    Depression     Panic disorder without agoraphobia 9/16/2015       Past Surgical History:   Procedure Laterality Date    APPENDECTOMY      CARPAL TUNNEL RELEASE      FACIAL RECONSTRUCTION SURGERY      LAPAROSCOPIC APPENDECTOMY N/A 10/31/2020    Procedure: APPENDECTOMY, LAPAROSCOPIC;  Surgeon: Steve Saucedo Jr., MD;  Location: [facility];  Service: General;  Laterality: N/A;    LAPAROSCOPIC SALPINGECTOMY Bilateral  "11/09/2022    Procedure: SALPINGECTOMY, LAPAROSCOPIC, REMOVAL OF NEXPLANON ;  Surgeon: Shivani Rodgers MD;  Location: [facility] OR;  Service: OB/GYN;  Laterality: Bilateral;    RADIOFREQUENCY ABLATION  04/2024    [place]    TONSILLECTOMY         Family History   Problem Relation Name Age of Onset    No Known Problems Mother      Breast cancer Neg Hx      Ovarian cancer Neg Hx      Thyroid cancer Neg Hx         Social History     Tobacco Use    Smoking status: Never     Passive exposure: Never    Smokeless tobacco: Never   Substance Use Topics    Alcohol use: Never    Drug use: Never       Social History     Social History Narrative    Lives with . Astranged from her mother. Graduated [employer], majored in psychology with several minors. 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 . Denies alcohol, smoking or illicit drugs. [facility].        ALLERGIES:   Review of patient's allergies indicates:   Allergen Reactions    Sulfamethoxazole-trimethoprim Hives, Itching and Rash       MEDS:   Current Outpatient Medications on File Prior to Visit   Medication Sig Dispense Refill Last Dose/Taking    venlafaxine (EFFEXOR XR) 75 MG 24 hr capsule Take 1 capsule (75 mg total) by mouth once daily. Take with 150mg capsule for a total of 225mg daily. 30 capsule 3     venlafaxine (EFFEXOR-XR) 150 MG Cp24 Take 1 capsule (150 mg total) by mouth once daily. Take with 75mg capsule for a total of 225mg daily. 30 capsule 3     [DISCONTINUED] buPROPion (WELLBUTRIN XL) 150 MG TB24 tablet Take 1 tablet (150 mg total) by mouth every other day. 15 tablet 0     [DISCONTINUED] semaglutide, weight loss, 1 mg/0.5 mL PnIj Semaglutide-L-carnitine 1mg-100mg/1mL: inject 1 mL subcutenously weekly, dispense in 4 MDVs for 1 month supply 4 mL 1        Vital signs:   Vitals:    01/22/25 0811   Weight: 97.5 kg (215 lb)   Height: 5' 9" (1.753 m)         Body mass index is 31.75 kg/m².    PHYSICAL EXAM:     Physical Exam  Constitutional:  "      General: She is not in acute distress.  Pulmonary:      Effort: Pulmonary effort is normal. No respiratory distress.   Neurological:      Mental Status: She is alert.   Psychiatric:         Speech: Speech normal.           PERTINENT RESULTS:   No visits with results within 1 Week(s) from this visit.   Latest known visit with results is:   Lab Visit on 06/25/2024   Component Date Value Ref Range Status    TSH 06/25/2024 1.295  0.400 - 4.000 uIU/mL Final    Cholesterol 06/25/2024 216 (H)  120 - 199 mg/dL Final    Comment: The National Cholesterol Education Program (NCEP) has set the  following guidelines (reference ranges) for Cholesterol:  Optimal.....................<200 mg/dL  Borderline High.............200-239 mg/dL  High........................> or = 240 mg/dL      Triglycerides 06/25/2024 122  30 - 150 mg/dL Final    Comment: The National Cholesterol Education Program (NCEP) has set the  following guidelines (reference values) for triglycerides:  Normal......................<150 mg/dL  Borderline High.............150-199 mg/dL  High........................200-499 mg/dL      HDL 06/25/2024 52  40 - 75 mg/dL Final    Comment: The National Cholesterol Education Program (NCEP) has set the  following guidelines (reference values) for HDL Cholesterol:  Low...............<40 mg/dL  Optimal...........>60 mg/dL      LDL Cholesterol 06/25/2024 139.6  63.0 - 159.0 mg/dL Final    Comment: The National Cholesterol Education Program (NCEP) has set the  following guidelines (reference values) for LDL Cholesterol:  Optimal.......................<130 mg/dL  Borderline High...............130-159 mg/dL  High..........................160-189 mg/dL  Very High.....................>190 mg/dL      HDL/Cholesterol Ratio 06/25/2024 24.1  20.0 - 50.0 % Final    Total Cholesterol/HDL Ratio 06/25/2024 4.2  2.0 - 5.0 Final    Non-HDL Cholesterol 06/25/2024 164  mg/dL Final    Comment: Risk category and Non-HDL cholesterol  goals:  Coronary heart disease (CHD)or equivalent (10-year risk of CHD >20%):  Non-HDL cholesterol goal     <130 mg/dL  Two or more CHD risk factors and 10-year risk of CHD <= 20%:  Non-HDL cholesterol goal     <160 mg/dL  0 to 1 CHD risk factor:  Non-HDL cholesterol goal     <190 mg/dL      Hemoglobin A1C 06/25/2024 5.3  4.0 - 5.6 % Final    Comment: ADA Screening Guidelines:  5.7-6.4%  Consistent with prediabetes  >or=6.5%  Consistent with diabetes    High levels of fetal hemoglobin interfere with the HbA1C  assay. Heterozygous hemoglobin variants (HbS, HgC, etc)do  not significantly interfere with this assay.   However, presence of multiple variants may affect accuracy.      Estimated Avg Glucose 06/25/2024 105  68 - 131 mg/dL Final    Sodium 06/25/2024 140  136 - 145 mmol/L Final    Potassium 06/25/2024 4.1  3.5 - 5.1 mmol/L Final    Chloride 06/25/2024 105  95 - 110 mmol/L Final    CO2 06/25/2024 25  23 - 29 mmol/L Final    Glucose 06/25/2024 96  70 - 110 mg/dL Final    BUN 06/25/2024 8  6 - 20 mg/dL Final    Creatinine 06/25/2024 0.9  0.5 - 1.4 mg/dL Final    Calcium 06/25/2024 10.2  8.7 - 10.5 mg/dL Final    Total Protein 06/25/2024 7.6  6.0 - 8.4 g/dL Final    Albumin 06/25/2024 4.1  3.5 - 5.2 g/dL Final    Total Bilirubin 06/25/2024 0.2  0.1 - 1.0 mg/dL Final    Comment: For infants and newborns, interpretation of results should be based  on gestational age, weight and in agreement with clinical  observations.    Premature Infant recommended reference ranges:  Up to 24 hours.............<8.0 mg/dL  Up to 48 hours............<12.0 mg/dL  3-5 days..................<15.0 mg/dL  6-29 days.................<15.0 mg/dL      Alkaline Phosphatase 06/25/2024 88  55 - 135 U/L Final    AST 06/25/2024 18  10 - 40 U/L Final    ALT 06/25/2024 16  10 - 44 U/L Final    eGFR 06/25/2024 >60.0  >60 mL/min/1.73 m^2 Final    Anion Gap 06/25/2024 10  8 - 16 mmol/L Final    WBC 06/25/2024 9.14  3.90 - 12.70 K/uL Final    RBC  06/25/2024 4.73  4.00 - 5.40 M/uL Final    Hemoglobin 06/25/2024 13.9  12.0 - 16.0 g/dL Final    Hematocrit 06/25/2024 42.0  37.0 - 48.5 % Final    MCV 06/25/2024 89  82 - 98 fL Final    MCH 06/25/2024 29.4  27.0 - 31.0 pg Final    MCHC 06/25/2024 33.1  32.0 - 36.0 g/dL Final    RDW 06/25/2024 13.1  11.5 - 14.5 % Final    Platelets 06/25/2024 405  150 - 450 K/uL Final    MPV 06/25/2024 10.3  9.2 - 12.9 fL Final    Immature Granulocytes 06/25/2024 0.2  0.0 - 0.5 % Final    Gran # (ANC) 06/25/2024 5.2  1.8 - 7.7 K/uL Final    Immature Grans (Abs) 06/25/2024 0.02  0.00 - 0.04 K/uL Final    Comment: Mild elevation in immature granulocytes is non specific and   can be seen in a variety of conditions including stress response,   acute inflammation, trauma and pregnancy. Correlation with other   laboratory and clinical findings is essential.      Lymph # 06/25/2024 3.0  1.0 - 4.8 K/uL Final    Mono # 06/25/2024 0.8  0.3 - 1.0 K/uL Final    Eos # 06/25/2024 0.1  0.0 - 0.5 K/uL Final    Baso # 06/25/2024 0.07  0.00 - 0.20 K/uL Final    nRBC 06/25/2024 0  0 /100 WBC Final    Gran % 06/25/2024 57.0  38.0 - 73.0 % Final    Lymph % 06/25/2024 32.6  18.0 - 48.0 % Final    Mono % 06/25/2024 8.3  4.0 - 15.0 % Final    Eosinophil % 06/25/2024 1.1  0.0 - 8.0 % Final    Basophil % 06/25/2024 0.8  0.0 - 1.9 % Final    Differential Method 06/25/2024 Automated   Final       ASSESSMENT/PLAN:    1. Obesity (BMI 30-39.9)  Overview:  - Thelma Roman denies history of pancreatitis or heavy alcohol use and denies family and person history of thyroid medullary cancer and MENs  - discussed recommendation for diet and cardiovascular exercise  - counseling on lifestyle modifications for risk factor reduction  - discussed cost being a limiting issue for weight loss medication and directed to  website  - recommend add psyllium husk fiber supplement.  I recommend starting with once a day and increase as tolerated.  Recommend good water  intake  - recommend diversify diet and goal of 3-4 colors of the rainbow in every meal and avoid UPF  - okay for ondansetron as needed for nausea  - starting weight 01/22/2025 225 lbs  - 01/22/2025   Wt Readings from Last 1 Encounters:   01/22/25 97.5 kg (215 lb)   - current weight lost 10 lbs  - restart semaglutide 0 5 weekly since she had increased nausea and vomiting at 1 mg  -continue to focus on diet and exercise    Orders:  -     semaglutide, weight loss, (WEGOVY) 0.5 mg/0.5 mL PnIj; Inject 0.5 mg into the skin every 7 days.  Dispense: 2 mL; Refill: 2  -     ondansetron (ZOFRAN-ODT) 8 MG TbDL; Take 1 tablet (8 mg total) by mouth every 6 (six) hours as needed (nausea).  Dispense: 30 tablet; Refill: 0                ORDERS:   Orders Placed This Encounter    semaglutide, weight loss, (WEGOVY) 0.5 mg/0.5 mL PnIj    ondansetron (ZOFRAN-ODT) 8 MG TbDL           Vaccines recommended:  Flu, Prevnar COVID-19    Follow up in about 3 months (around 4/22/2025) for Virtual Visit, Weight. or sooner with any concerns      This note is dictated using the M*Modal Fluency Direct word recognition program. There are word recognition mistakes that are occasionally missed on review.    Dr. Amy Molina D.O.   Family Medicine

## 2025-01-22 ENCOUNTER — OFFICE VISIT (OUTPATIENT)
Dept: PRIMARY CARE CLINIC | Facility: CLINIC | Age: 30
End: 2025-01-22
Attending: FAMILY MEDICINE

## 2025-01-22 VITALS — WEIGHT: 215 LBS | HEIGHT: 69 IN | BODY MASS INDEX: 31.84 KG/M2

## 2025-01-22 DIAGNOSIS — E66.9 OBESITY (BMI 30-39.9): Primary | ICD-10-CM

## 2025-01-22 RX ORDER — ONDANSETRON 8 MG/1
8 TABLET, ORALLY DISINTEGRATING ORAL EVERY 6 HOURS PRN
Qty: 30 TABLET | Refills: 0 | Status: SHIPPED | OUTPATIENT
Start: 2025-01-22

## 2025-01-22 RX ORDER — SEMAGLUTIDE 0.5 MG/.5ML
0.5 INJECTION, SOLUTION SUBCUTANEOUS
Qty: 2 ML | Refills: 2 | Status: SHIPPED | OUTPATIENT
Start: 2025-01-22 | End: 2025-04-22

## 2025-01-31 ENCOUNTER — PATIENT MESSAGE (OUTPATIENT)
Dept: PRIMARY CARE CLINIC | Facility: CLINIC | Age: 30
End: 2025-01-31

## 2025-02-27 ENCOUNTER — E-VISIT (OUTPATIENT)
Dept: PRIMARY CARE CLINIC | Facility: CLINIC | Age: 30
End: 2025-02-27
Attending: FAMILY MEDICINE

## 2025-02-27 DIAGNOSIS — H10.32 ACUTE CONJUNCTIVITIS OF LEFT EYE, UNSPECIFIED ACUTE CONJUNCTIVITIS TYPE: Primary | ICD-10-CM

## 2025-02-27 RX ORDER — CIPROFLOXACIN HYDROCHLORIDE 3 MG/ML
1 SOLUTION/ DROPS OPHTHALMIC 4 TIMES DAILY
Qty: 2.5 ML | Refills: 0 | Status: SHIPPED | OUTPATIENT
Start: 2025-02-27
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
## 2025-04-26 ENCOUNTER — PATIENT MESSAGE (OUTPATIENT)
Dept: PRIMARY CARE CLINIC | Facility: CLINIC | Age: 30
End: 2025-04-26

## 2025-05-05 ENCOUNTER — OFFICE VISIT (OUTPATIENT)
Dept: PRIMARY CARE CLINIC | Facility: CLINIC | Age: 30
End: 2025-05-05

## 2025-05-05 VITALS — WEIGHT: 230 LBS | BODY MASS INDEX: 34.07 KG/M2 | HEIGHT: 69 IN

## 2025-05-05 DIAGNOSIS — E66.9 OBESITY (BMI 30-39.9): Primary | ICD-10-CM

## 2025-05-05 NOTE — PROGRESS NOTES
VIRTUAL VISIT/TELEMEDICINE VISIT  FAMILY MEDICINE  [facility]    The patient location is: [place]  The chief complaint leading to consultation is:   Chief Complaint   Patient presents with    Weight Loss    Medication Problem     Request to switch from semaglutide to tirzepatide     Visit type: Virtual visit with synchronous audio and video   Total time spent: 25 minute  Each patient to whom he or she provides medical services by telemedicine is:  (1) informed of the relationship between the physician and patient and the respective role of any other health care provider with respect to management of the patient; and (2) notified that he or she may decline to receive medical services by telemedicine and may withdraw from such care at any time.    HPI: Thelma Roman is a 29 y.o. female presenting today for discussion regarding weight loss medication.    Patient is an established patient of PCP, Dr. Amy Molina DO. This patient is new to me.    1. Obesity starting BMI 33.23 8/28/24     Today Thelma Roman  presents to discuss medication change due to side effects from Semaglutide. She reports previous use of Semaglutide for one month resulting in 15 lbs of weight loss, however she experienced severe nausea that was unresponsive to Zofran, along with complete loss of appetite. She discontinued Semaglutide over a month ago due to these side effects. She is interested in transitioning to Tirzepatide instead for improved tolerability. Her current weight is 230 lbs and goal weight is 180 lbs.     Prior note  1/22/25 Thelma Roman reports that she has been well.  She was on semaglutide 1 mg weekly up to 1 month ago.  He ran out of medication after she missed a follow up visit.  Unfortunately, she reports a 1 mg made her very nauseated.  She had episodes emesis.  She reports that she felt better on 0 5 mg.  She lost about 15 lb since starting the medication.  And thankfully she reports that she  has been stable since she ran out of the medication at 2:15 a.m..  She is overall pleased.  She would like to restart her semaglutide 0.5 mg weekly  She has changed jobs but reports that her activity at work is about same.  She continues to try to exercise regularly.  She reports that she does not feel her diet has changed since starting medication.    10/2/24 WEIGHT MANAGEMENT MEDICATION:She reports taking semaglutide 0.5 mg for about 3 weeks. She experienced vomiting during the first week at this dose. She continues to experience low-level nausea, particularly when she has not eaten, but notes it resolves with ginger ale consumption. She denies constipation or abdominal pain. She reports reduced appetite since starting the medication and is eating less overall. She has increased her fruit consumption compared to her usual intake, with no other significant dietary changes.     EXERCISE AND LIFESTYLE:She reports not exercising recently due to work commitments. She has a busy work schedule which has impacted her ability to engage in regular exercise.     8/28/24: Thelma Roman reports that you spoke with her insurance company and they do cover weight loss medication.  She never started compounding semaglutide 0.25 mg weekly.  She wants to focus on diet and exercise.  She would like to start Wegovy 0.5 mg weekly.  06/27/2024:   She reports that she is concerned with her steady weight gain over the last 4 years.  Seemed to start during the COVID-19 pandemic.  She reports that she does not eat a large amount of food.  She does try to exercise regularly.  She went to a weight loss specialist and tried intermittent fasting, focusing on diet and exercise and a few medications that were not effective.  She is interested in starting a GLP 1 inhibitor.  She checked with her insurance they do cover Wegovy for weight loss.  She reports that she is read up on potential side effects.     Current weight:   5/5/2025 230 lbs      Prior weight history:   10/2/24 222 lbs - compounded semaglutide/L carnitine 0.5 mg weekly increased to 1 mg weekly  8/28/24 225 lbs - start Wegovy 0.5 mg weekly however insurance did not cover the medication.  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     Prior weight loss program: intermittent fasting, medications (pill but unsure which one), diet and exercise     Medications for weight loss:  semaglutide, weight loss, (WEGOVY) 0.5 mg/0.5 mL PnIj, Semaglutide-L-carnitine 1mg-100mg/1mL: inject 0.5 mL subcutenously weekly, dispense in 1 mL MDV for 1 month supply, Disp: 2 mL, Rfl: 0  - cannot tolerate side effects     Medications that may impede weight loss:    Venlafaxine     Comorbidities:    Depression and anxiety        Answers submitted by the patient for this visit:  Review of Systems Questionnaire (Submitted on 4/30/2025)  activity change: No  unexpected weight change: No  rhinorrhea: No  trouble swallowing: No  visual disturbance: No  chest tightness: No  polyuria: No  difficulty urinating: No  menstrual problem: No  joint swelling: No  arthralgias: No  confusion: No  dysphoric mood: No    Review of Systems   HENT:  Negative for hearing loss.    Eyes:  Negative for discharge.   Respiratory:  Negative for wheezing.    Cardiovascular:  Negative for chest pain and palpitations.   Gastrointestinal:  Negative for blood in stool, constipation, diarrhea and vomiting.   Genitourinary:  Negative  "for dysuria and hematuria.   Musculoskeletal:  Negative for neck pain.   Neurological:  Negative for weakness and headaches.   Endo/Heme/Allergies:  Negative for polydipsia.       Social History     Social History Narrative    Lives with . Astranged from her mother. Graduated [employer], majored in psychology with several minors. 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 . Denies alcohol, smoking or illicit drugs. WSM.        ALLERGIES:   Review of patient's allergies indicates:   Allergen Reactions    Sulfamethoxazole-trimethoprim Hives, Itching and Rash       MEDS:   Current Outpatient Medications on File Prior to Visit   Medication Sig Dispense Refill Last Dose/Taking    [DISCONTINUED] ciprofloxacin HCl (CILOXAN) 0.3 % ophthalmic solution Place 1 drop into the left eye 4 (four) times daily. 2.5 mL 0     [DISCONTINUED] ondansetron (ZOFRAN-ODT) 8 MG TbDL Take 1 tablet (8 mg total) by mouth every 6 (six) hours as needed (nausea). 30 tablet 0     [DISCONTINUED] venlafaxine (EFFEXOR XR) 75 MG 24 hr capsule Take 1 capsule (75 mg total) by mouth once daily. Take with 150mg capsule for a total of 225mg daily. 30 capsule 3        Vital signs:   Vitals:    05/05/25 1427   Weight: 104.3 kg (230 lb)   Height: 5' 9" (1.753 m)     Body mass index is 33.97 kg/m².    PHYSICAL EXAM:     Physical Exam  Constitutional:       Appearance: Normal appearance. She is not ill-appearing or diaphoretic.   HENT:      Head: Normocephalic and atraumatic.   Pulmonary:      Effort: Pulmonary effort is normal.   Skin:     Coloration: Skin is not pale.   Neurological:      Mental Status: She is alert and oriented to person, place, and time.   Psychiatric:         Mood and Affect: Mood normal.         Behavior: Behavior normal.         Thought Content: Thought content normal.         Judgment: Judgment normal.           PERTINENT RESULTS:   No visits with results within 1 Week(s) from this visit.   Latest " known visit with results is:   Lab Visit on 06/25/2024   Component Date Value Ref Range Status    TSH 06/25/2024 1.295  0.400 - 4.000 uIU/mL Final    Cholesterol 06/25/2024 216 (H)  120 - 199 mg/dL Final    Comment: The National Cholesterol Education Program (NCEP) has set the  following guidelines (reference ranges) for Cholesterol:  Optimal.....................<200 mg/dL  Borderline High.............200-239 mg/dL  High........................> or = 240 mg/dL      Triglycerides 06/25/2024 122  30 - 150 mg/dL Final    Comment: The National Cholesterol Education Program (NCEP) has set the  following guidelines (reference values) for triglycerides:  Normal......................<150 mg/dL  Borderline High.............150-199 mg/dL  High........................200-499 mg/dL      HDL 06/25/2024 52  40 - 75 mg/dL Final    Comment: The National Cholesterol Education Program (NCEP) has set the  following guidelines (reference values) for HDL Cholesterol:  Low...............<40 mg/dL  Optimal...........>60 mg/dL      LDL Cholesterol 06/25/2024 139.6  63.0 - 159.0 mg/dL Final    Comment: The National Cholesterol Education Program (NCEP) has set the  following guidelines (reference values) for LDL Cholesterol:  Optimal.......................<130 mg/dL  Borderline High...............130-159 mg/dL  High..........................160-189 mg/dL  Very High.....................>190 mg/dL      HDL/Cholesterol Ratio 06/25/2024 24.1  20.0 - 50.0 % Final    Total Cholesterol/HDL Ratio 06/25/2024 4.2  2.0 - 5.0 Final    Non-HDL Cholesterol 06/25/2024 164  mg/dL Final    Comment: Risk category and Non-HDL cholesterol goals:  Coronary heart disease (CHD)or equivalent (10-year risk of CHD >20%):  Non-HDL cholesterol goal     <130 mg/dL  Two or more CHD risk factors and 10-year risk of CHD <= 20%:  Non-HDL cholesterol goal     <160 mg/dL  0 to 1 CHD risk factor:  Non-HDL cholesterol goal     <190 mg/dL      Hemoglobin A1C 06/25/2024 5.3  4.0  - 5.6 % Final    Comment: ADA Screening Guidelines:  5.7-6.4%  Consistent with prediabetes  >or=6.5%  Consistent with diabetes    High levels of fetal hemoglobin interfere with the HbA1C  assay. Heterozygous hemoglobin variants (HbS, HgC, etc)do  not significantly interfere with this assay.   However, presence of multiple variants may affect accuracy.      Estimated Avg Glucose 06/25/2024 105  68 - 131 mg/dL Final    Sodium 06/25/2024 140  136 - 145 mmol/L Final    Potassium 06/25/2024 4.1  3.5 - 5.1 mmol/L Final    Chloride 06/25/2024 105  95 - 110 mmol/L Final    CO2 06/25/2024 25  23 - 29 mmol/L Final    Glucose 06/25/2024 96  70 - 110 mg/dL Final    BUN 06/25/2024 8  6 - 20 mg/dL Final    Creatinine 06/25/2024 0.9  0.5 - 1.4 mg/dL Final    Calcium 06/25/2024 10.2  8.7 - 10.5 mg/dL Final    Total Protein 06/25/2024 7.6  6.0 - 8.4 g/dL Final    Albumin 06/25/2024 4.1  3.5 - 5.2 g/dL Final    Total Bilirubin 06/25/2024 0.2  0.1 - 1.0 mg/dL Final    Comment: For infants and newborns, interpretation of results should be based  on gestational age, weight and in agreement with clinical  observations.    Premature Infant recommended reference ranges:  Up to 24 hours.............<8.0 mg/dL  Up to 48 hours............<12.0 mg/dL  3-5 days..................<15.0 mg/dL  6-29 days.................<15.0 mg/dL      Alkaline Phosphatase 06/25/2024 88  55 - 135 U/L Final    AST 06/25/2024 18  10 - 40 U/L Final    ALT 06/25/2024 16  10 - 44 U/L Final    eGFR 06/25/2024 >60.0  >60 mL/min/1.73 m^2 Final    Anion Gap 06/25/2024 10  8 - 16 mmol/L Final    WBC 06/25/2024 9.14  3.90 - 12.70 K/uL Final    RBC 06/25/2024 4.73  4.00 - 5.40 M/uL Final    Hemoglobin 06/25/2024 13.9  12.0 - 16.0 g/dL Final    Hematocrit 06/25/2024 42.0  37.0 - 48.5 % Final    MCV 06/25/2024 89  82 - 98 fL Final    MCH 06/25/2024 29.4  27.0 - 31.0 pg Final    MCHC 06/25/2024 33.1  32.0 - 36.0 g/dL Final    RDW 06/25/2024 13.1  11.5 - 14.5 % Final    Platelets  06/25/2024 405  150 - 450 K/uL Final    MPV 06/25/2024 10.3  9.2 - 12.9 fL Final    Immature Granulocytes 06/25/2024 0.2  0.0 - 0.5 % Final    Gran # (ANC) 06/25/2024 5.2  1.8 - 7.7 K/uL Final    Immature Grans (Abs) 06/25/2024 0.02  0.00 - 0.04 K/uL Final    Comment: Mild elevation in immature granulocytes is non specific and   can be seen in a variety of conditions including stress response,   acute inflammation, trauma and pregnancy. Correlation with other   laboratory and clinical findings is essential.      Lymph # 06/25/2024 3.0  1.0 - 4.8 K/uL Final    Mono # 06/25/2024 0.8  0.3 - 1.0 K/uL Final    Eos # 06/25/2024 0.1  0.0 - 0.5 K/uL Final    Baso # 06/25/2024 0.07  0.00 - 0.20 K/uL Final    nRBC 06/25/2024 0  0 /100 WBC Final    Gran % 06/25/2024 57.0  38.0 - 73.0 % Final    Lymph % 06/25/2024 32.6  18.0 - 48.0 % Final    Mono % 06/25/2024 8.3  4.0 - 15.0 % Final    Eosinophil % 06/25/2024 1.1  0.0 - 8.0 % Final    Basophil % 06/25/2024 0.8  0.0 - 1.9 % Final    Differential Method 06/25/2024 Automated   Final       ASSESSMENT/PLAN:        1. Obesity (BMI 30-39.9)  Overview:  - Thelma Roman denies history of pancreatitis or heavy alcohol use and denies family and person history of thyroid medullary cancer and MENs  - discussed recommendation for diet and cardiovascular exercise  - counseling on lifestyle modifications for risk factor reduction  - discussed cost being a limiting issue for weight loss medication and directed to  website  - recommend add psyllium husk fiber supplement.  I recommend starting with once a day and increase as tolerated.  Recommend good water intake  - recommend diversify diet and goal of 3-4 colors of the rainbow in every meal and avoid UPF  - okay for ondansetron as needed for nausea  - starting weight 01/22/2025 225 lbs  - 01/22/2025  215 lbs  - 10 lbs weight loss with semaglutide however she could not tolerate nausea even at reduced dose  - current weight is 230  lbs off of GLP-1 agonist for one month  - she requests to switch to tirzepatide  - recommend start tirzepatide 2 mg weekly  - continue to focus to diet and exercise    Orders:  -     tirzepatide, weight loss, 2.5 mg/0.5 mL PnIj; Tirzepatide-methylcobalamin 16.75mg-1mg/1mL: Inject 12 units (2 mg) subcutaneously weekly. B12 Indication: Nausea  Dispense: 2 mL; Refill: 0      Follow up in about 4 weeks (around 6/2/2025) for Virtual Visit weight loss.     RAYMOND SelfP-C   Internal Medicine

## 2025-05-08 ENCOUNTER — OFFICE VISIT (OUTPATIENT)
Dept: PSYCHIATRY | Facility: CLINIC | Age: 30
End: 2025-05-08

## 2025-05-08 DIAGNOSIS — F41.0 GENERALIZED ANXIETY DISORDER WITH PANIC ATTACKS: ICD-10-CM

## 2025-05-08 DIAGNOSIS — F33.1 MODERATE EPISODE OF RECURRENT MAJOR DEPRESSIVE DISORDER: Primary | ICD-10-CM

## 2025-05-08 DIAGNOSIS — F43.10 PTSD (POST-TRAUMATIC STRESS DISORDER): ICD-10-CM

## 2025-05-08 DIAGNOSIS — F41.1 GENERALIZED ANXIETY DISORDER WITH PANIC ATTACKS: ICD-10-CM

## 2025-05-08 RX ORDER — VENLAFAXINE HYDROCHLORIDE 75 MG/1
CAPSULE, EXTENDED RELEASE ORAL
Qty: 30 CAPSULE | Refills: 3 | Status: SHIPPED | OUTPATIENT
Start: 2025-05-08

## 2025-05-08 RX ORDER — VENLAFAXINE HYDROCHLORIDE 150 MG/1
CAPSULE, EXTENDED RELEASE ORAL
Qty: 30 CAPSULE | Refills: 3 | Status: SHIPPED | OUTPATIENT
Start: 2025-05-08

## 2025-05-08 RX ORDER — BUPROPION HYDROCHLORIDE 150 MG/1
150 TABLET ORAL DAILY
Qty: 30 TABLET | Refills: 3 | Status: SHIPPED | OUTPATIENT
Start: 2025-05-08 | End: 2025-09-05

## 2025-05-08 NOTE — PROGRESS NOTES
OUTPATIENT PSYCHIATRY FOLLOW UP VISIT    ENCOUNTER DATE:  5/8/2025  SITE:  [facility]  LENGTH OF SESSION:  30 minutes    TELE PSYCHIATRY Disclaimer   *The patient was informed despite using HIPPA compliant technology there may be risks including security breach, technological failure, inability to perform a comprehensive physical exam which could delay or prevent an accurate diagnosis, and potential complications from treatment decisions rendered over a telemedical platform. The patient understands and consented to the use of tele-health service as being a safe measure to mitigate during COVID 19 Pandemic .  The patient was also informed of the relationship between the physician and patient and the respective role of any other health care provider with respect to management of the patient; and notified that the pt may decline to receive medical services by telemedicine and may withdraw from such care at any time.     Patient's Current location: [address] [place] [address]  [address] [place] [address]  In Case of Emergency pts next of kin  Extended Emergency Contact Information  Primary Emergency Contact: Fernando Neri   [place]  Mobile Phone: [phone]  Relation: Spouse  Preferred language: English   needed? No  Secondary Emergency Contact: Figueroa Rivers  Address: [id number] [place] [address]  Home Phone: [phone]  Relation: Father  Visit type: Virtual visit with synchronous audio and video  Total time spent with patient: 20 min      CHIEF COMPLAINT:  Anxiety       HISTORY OF PRESENTING ILLNESS:  Thelma Roman is a 29 y.o. female with history of MDD in remission, HEMA with panic attacks, PTSD, who presents for follow up appointment.      Plan at last appointment on 12/18/2024:  Continue Effexor XR 225mg daily for anxiety, depression  TAPER OFF Wellbutrin XL 150mg - take very other day for 2-4 weeks then stop, per patient's  "symptomatic response and any adverse effects.     Interval history as told by Patient - & or family/friend/spouse/caregiver with pts permission    - doing well today. Tapered off the wellbutrin - no issues with it, not having it.   - inquired about prior PMDD dx, how to treat. Not so severe that pt wishes to add prozac, adjust meds. Increased irritability, but not quite to the point of taking time off work, interfering with life.   - effexor XR - no major difference. Unsure if it is working, or if it's not noticeable because it's working. If a dose gets missed, starts to get the brain zaps. Anxiety, depression sx feel stable, "not worse". Unsure if apathetic, upset because of the state of the world or sx from anxiety, depression. Having trouble with concentration still, some brain fog. Apathy/anhedonia.   - has had panic attacks, provoked - relating to stressors at work. Managing by "letting it run its course". Does not feel like needs PRN meds.       PSYCHIATRIC REVIEW OF SYSTEMS:(none/ yes- better/worse/stable/& what symptoms)    Symptoms of Depression: apathy, anhedonia, low energy, concentration    Symptoms of Anxiety/ panic attacks: stable    Symptoms of Kahty/Hypomania: denies     Symptoms of psychosis: denies     Sleep: no issues     Appetite: no issues     Other:    Alcohol:    Illicit Drugs:    Psychosocial stressors:     Risk Parameters:  Patient reports no suicidal ideation  Patient reports no homicidal ideation  Patient reports no self-injurious behavior  Patient reports no violent behavior    PSYCHIATRIC MED REVIEW    Current psych meds  Medication side effects:  denies  Medication compliance:  adherent     Previous psych meds trials  Zoloft  Prozac  Lexapro   Klonopin  Propranolol   Clonidine  Wellbutrin     PAST PSYCHIATRIC, MEDICAL, AND SOCIAL HISTORY REVIEWED  The patient's past medical, family and social history have been reviewed and updated as appropriate within the electronic medical record - " see encounter notes.    MEDICAL REVIEW OF SYSTEMS:  Complete review of systems performed covering Constitutional, Musculoskeletal, Neurologic.  All systems negative.    ALL MEDICATIONS:  Current Medications[1]    ALLERGIES:  Review of patient's allergies indicates:   Allergen Reactions    Sulfamethoxazole-trimethoprim Hives, Itching and Rash       RELEVANT LABS/STUDIES:    Lab Results   Component Value Date    WBC 9.14 06/25/2024    HGB 13.9 06/25/2024    HCT 42.0 06/25/2024    MCV 89 06/25/2024     06/25/2024     BMP  Lab Results   Component Value Date     06/25/2024    K 4.1 06/25/2024     06/25/2024    CO2 25 06/25/2024    BUN 8 06/25/2024    CREATININE 0.9 06/25/2024    CALCIUM 10.2 06/25/2024    ANIONGAP 10 06/25/2024    ESTGFRAFRICA >60 05/18/2022    EGFRNONAA >60 05/18/2022     Lab Results   Component Value Date    ALT 16 06/25/2024    AST 18 06/25/2024    ALKPHOS 88 06/25/2024    BILITOT 0.2 06/25/2024     Lab Results   Component Value Date    TSH 1.295 06/25/2024     Lab Results   Component Value Date    HGBA1C 5.3 06/25/2024       VITALS  There were no vitals filed for this visit. - virtual     PHYSICAL EXAM  General: well developed, well nourished  Neurologic:   Gait: n/a - virtual    Psychomotor signs:  No involuntary movements or tremor  AIMS: none    PSYCHIATRIC EXAM:     Mental Status Exam:  Appearance: unremarkable, age appropriate  Behavior/Cooperation: normal, cooperative  Speech: normal tone, normal rate, normal pitch, normal volume  Language: uses words appropriately; NO aphasia or dysarthria  Mood: apathetic  Affect: constricted  Thought Process: normal and logical  Thought Content: normal, no suicidality, no homicidality, delusions, or paranoia  Level of Consciousness: Alert and Oriented x3  Memory:  Intact  Attention/concentration: appropriate for age/education.   Fund of Knowledge: appears adequate  Insight: Intact  Judgment: Intact       IMPRESSION:    Thelma ryan a  29 y.o. female with history of MDD in remission, HEMA with panic attacks, PTSD, who presents for follow up appointment for med management. Pt tolerated coming off Wellbutrin XL 150mg daily well without adverse effects. However, reports return of sx of depression - apathy, anhedonia, low energy, trouble with concentration. Denies SI. Discussed adding Wellbutrin XL back versus watching and waiting, as well as discussed getting back into therapy. Pt agreeable to restart Wellbutrin XL.     Status/Progress:  Based on the examination today, the patient's problem(s) is/are inadequately controlled.  New problems have not been presented today.     DIAGNOSES:    ICD-10-CM ICD-9-CM   1. Moderate episode of recurrent major depressive disorder  F33.1 296.32   2. Generalized anxiety disorder with panic attacks  F41.1 300.02    F41.0 300.01   3. PTSD (post-traumatic stress disorder)  F43.10 309.81       PLAN:  Psych Med:  START Wellbutrin XL 150mg daily  Continue Effexor XR 225mg daily  Consider psychotherapy, no referral at this time  Discussed upcoming resident transition     Discussed with patient informed consent, risks vs. benefits, alternative treatments, side effect profile and the inherent unpredictability of individual responses to these treatments. Answered any questions patient may have had. The patient expresses understanding of the above and displays the capacity to agree with this current plan     Other: n/a    RETURN TO CLINIC:  Follow up in about 1 month (around 6/8/2025).      Kyrie Dubose MD  [facility] PGY-III  5/8/2025 9:59 AM      A total of 29 minutes was spent today on this encounter, including chart review,  review, seeing patient, documenting encounter, and ordering medications.           [1]   Current Outpatient Medications:     tirzepatide, weight loss, 2.5 mg/0.5 mL PnIj, Tirzepatide-methylcobalamin 16.75mg-1mg/1mL: Inject 12 units (2 mg) subcutaneously weekly. B12 Indication: Nausea,  Disp: 2 mL, Rfl: 0

## (undated) DEVICE — GOWN POLY REINF BRTH SLV LG

## (undated) DEVICE — SEE MEDLINE ITEM 156925

## (undated) DEVICE — NDL INSUF ULTRA VERESS 120MM

## (undated) DEVICE — GLOVE SURGICAL LATEX SZ 6.5

## (undated) DEVICE — PAD PREP CUFFED NS 24X48IN

## (undated) DEVICE — SUT MONOCRYL 4-0 PS-2

## (undated) DEVICE — SYR 10CC LUER LOCK

## (undated) DEVICE — DRAPE LAVH SURG 109X109X100IN

## (undated) DEVICE — MANIFOLD 4 PORT

## (undated) DEVICE — SOL CLEARIFY VISUALIZATION LAP

## (undated) DEVICE — TROCAR KII FIOS 5MM X 100MM

## (undated) DEVICE — ELECTRODE REM PLYHSV RETURN 9

## (undated) DEVICE — BANDAGE ADHESIVE PLAS STRL 1X3

## (undated) DEVICE — NDL HYPO REG 25G X 1 1/2

## (undated) DEVICE — IRRIGATOR ENDOSCOPY DISP.

## (undated) DEVICE — DRAPE THREE-QTR REINF 53X77IN

## (undated) DEVICE — CONTAINER MULTIPURP W/LID 16OZ

## (undated) DEVICE — SEALER LIGASURE LAP 37CM 5MM

## (undated) DEVICE — TROCAR ENDOPATH XCEL 5X75MM

## (undated) DEVICE — PANTIES FEMININE NAPKIN LG/XLG

## (undated) DEVICE — CART STAPLE FLEX ETX 3.5MM BLU

## (undated) DEVICE — GLOVE BIOGEL SKINSENSE PI 8.0

## (undated) DEVICE — BLADE SURG CARBON STEEL SZ11

## (undated) DEVICE — TOWEL OR DISP STRL BLUE 4/PK

## (undated) DEVICE — TROCAR ENDOPATH XCEL 5MM 7.5CM

## (undated) DEVICE — TROCAR ENDO KII FIOS 12X100MM

## (undated) DEVICE — PACK SURGERY START

## (undated) DEVICE — SUT MCRYL PLUS 4-0 PS2 27IN

## (undated) DEVICE — COVER OVERHEAD SURG LT BLUE

## (undated) DEVICE — BAG TISSUE RETRIEVAL 225ML

## (undated) DEVICE — CLOSURE SKIN STERI STRIP 1/2X4

## (undated) DEVICE — GLOVE BIOGEL PI MICRO INDIC 7

## (undated) DEVICE — GOWN POLY REINF BRTH SLV XL

## (undated) DEVICE — PENCIL ELECTROSURG HOLST W/BLD

## (undated) DEVICE — TRAY FOLEY 16FR INFECTION CONT

## (undated) DEVICE — ADHESIVE DERMABOND ADVANCED

## (undated) DEVICE — SEE MEDLINE ITEM 154981

## (undated) DEVICE — SYR B-D DISP CONTROL 10CC100/C

## (undated) DEVICE — STAPLER INT LINEAR ARTC 3.5-45

## (undated) DEVICE — TROCAR ENDOPATH XCEL 11MM 10CM

## (undated) DEVICE — SHEARS HARMONIC 36CM HD 1000I

## (undated) DEVICE — KIT ANTIFOG

## (undated) DEVICE — SUT VICRYL 0 27 CT-2

## (undated) DEVICE — TUBING INSUFFLATION 10

## (undated) DEVICE — PAD CNTOUR SUP-ABSRB POSTPRTM

## (undated) DEVICE — APPLICATOR CHLORAPREP ORN 26ML

## (undated) DEVICE — PACK BASIC